# Patient Record
Sex: MALE | Race: OTHER | Employment: FULL TIME | URBAN - METROPOLITAN AREA
[De-identification: names, ages, dates, MRNs, and addresses within clinical notes are randomized per-mention and may not be internally consistent; named-entity substitution may affect disease eponyms.]

---

## 2017-09-22 ENCOUNTER — OFFICE VISIT (OUTPATIENT)
Dept: FAMILY MEDICINE CLINIC | Facility: CLINIC | Age: 52
End: 2017-09-22

## 2017-09-22 VITALS
BODY MASS INDEX: 27.16 KG/M2 | SYSTOLIC BLOOD PRESSURE: 124 MMHG | WEIGHT: 169 LBS | TEMPERATURE: 98 F | RESPIRATION RATE: 16 BRPM | HEIGHT: 66 IN | DIASTOLIC BLOOD PRESSURE: 70 MMHG | HEART RATE: 60 BPM

## 2017-09-22 DIAGNOSIS — Z12.11 COLON CANCER SCREENING: ICD-10-CM

## 2017-09-22 DIAGNOSIS — Z13.1 DIABETES MELLITUS SCREENING: ICD-10-CM

## 2017-09-22 DIAGNOSIS — Z13.29 THYROID DISORDER SCREEN: ICD-10-CM

## 2017-09-22 DIAGNOSIS — R68.82 DECREASED SEX DRIVE: ICD-10-CM

## 2017-09-22 DIAGNOSIS — M25.562 CHRONIC PAIN OF BOTH KNEES: Primary | ICD-10-CM

## 2017-09-22 DIAGNOSIS — G89.29 CHRONIC PAIN OF BOTH KNEES: Primary | ICD-10-CM

## 2017-09-22 DIAGNOSIS — Z80.0 FAMILY HISTORY OF STOMACH CANCER: ICD-10-CM

## 2017-09-22 DIAGNOSIS — M25.561 CHRONIC PAIN OF BOTH KNEES: Primary | ICD-10-CM

## 2017-09-22 DIAGNOSIS — Z13.220 LIPID SCREENING: ICD-10-CM

## 2017-09-22 DIAGNOSIS — Z13.0 SCREENING, ANEMIA, DEFICIENCY, IRON: ICD-10-CM

## 2017-09-22 LAB
ALBUMIN SERPL-MCNC: 4.1 G/DL (ref 3.5–4.8)
ALP LIVER SERPL-CCNC: 69 U/L (ref 45–117)
ALT SERPL-CCNC: 41 U/L (ref 17–63)
AST SERPL-CCNC: 21 U/L (ref 15–41)
BASOPHILS # BLD AUTO: 0.04 X10(3) UL (ref 0–0.1)
BASOPHILS NFR BLD AUTO: 0.6 %
BILIRUB SERPL-MCNC: 0.4 MG/DL (ref 0.1–2)
BUN BLD-MCNC: 15 MG/DL (ref 8–20)
CALCIUM BLD-MCNC: 8.9 MG/DL (ref 8.3–10.3)
CHLORIDE: 107 MMOL/L (ref 101–111)
CHOLEST SMN-MCNC: 202 MG/DL (ref ?–200)
CO2: 27 MMOL/L (ref 22–32)
CREAT BLD-MCNC: 1 MG/DL (ref 0.7–1.3)
EOSINOPHIL # BLD AUTO: 0.3 X10(3) UL (ref 0–0.3)
EOSINOPHIL NFR BLD AUTO: 4.4 %
ERYTHROCYTE [DISTWIDTH] IN BLOOD BY AUTOMATED COUNT: 12.3 % (ref 11.5–16)
EST. AVERAGE GLUCOSE BLD GHB EST-MCNC: 120 MG/DL (ref 68–126)
GLUCOSE BLD-MCNC: 106 MG/DL (ref 70–99)
HBA1C MFR BLD HPLC: 5.8 % (ref ?–5.7)
HCT VFR BLD AUTO: 44.4 % (ref 37–53)
HDLC SERPL-MCNC: 40 MG/DL (ref 45–?)
HDLC SERPL: 5.05 {RATIO} (ref ?–4.97)
HGB BLD-MCNC: 15.1 G/DL (ref 13–17)
IMMATURE GRANULOCYTE COUNT: 0.01 X10(3) UL (ref 0–1)
IMMATURE GRANULOCYTE RATIO %: 0.1 %
LDLC SERPL CALC-MCNC: 97 MG/DL (ref ?–130)
LDLC SERPL-MCNC: 65 MG/DL (ref 5–40)
LYMPHOCYTES # BLD AUTO: 2.64 X10(3) UL (ref 0.9–4)
LYMPHOCYTES NFR BLD AUTO: 38.8 %
M PROTEIN MFR SERPL ELPH: 7.2 G/DL (ref 6.1–8.3)
MCH RBC QN AUTO: 32 PG (ref 27–33.2)
MCHC RBC AUTO-ENTMCNC: 34 G/DL (ref 31–37)
MCV RBC AUTO: 94.1 FL (ref 80–99)
MONOCYTES # BLD AUTO: 0.53 X10(3) UL (ref 0.1–0.6)
MONOCYTES NFR BLD AUTO: 7.8 %
NEUTROPHIL ABS PRELIM: 3.28 X10 (3) UL (ref 1.3–6.7)
NEUTROPHILS # BLD AUTO: 3.28 X10(3) UL (ref 1.3–6.7)
NEUTROPHILS NFR BLD AUTO: 48.3 %
NONHDLC SERPL-MCNC: 162 MG/DL (ref ?–130)
PLATELET # BLD AUTO: 205 10(3)UL (ref 150–450)
POTASSIUM SERPL-SCNC: 3.9 MMOL/L (ref 3.6–5.1)
RBC # BLD AUTO: 4.72 X10(6)UL (ref 4.3–5.7)
RED CELL DISTRIBUTION WIDTH-SD: 43 FL (ref 35.1–46.3)
SODIUM SERPL-SCNC: 140 MMOL/L (ref 136–144)
TRIGLYCERIDES: 323 MG/DL (ref ?–150)
TSI SER-ACNC: 1.29 MIU/ML (ref 0.35–5.5)
WBC # BLD AUTO: 6.8 X10(3) UL (ref 4–13)

## 2017-09-22 PROCEDURE — 80050 GENERAL HEALTH PANEL: CPT | Performed by: FAMILY MEDICINE

## 2017-09-22 PROCEDURE — 83036 HEMOGLOBIN GLYCOSYLATED A1C: CPT | Performed by: FAMILY MEDICINE

## 2017-09-22 PROCEDURE — 99214 OFFICE O/P EST MOD 30 MIN: CPT | Performed by: FAMILY MEDICINE

## 2017-09-22 PROCEDURE — 36415 COLL VENOUS BLD VENIPUNCTURE: CPT | Performed by: FAMILY MEDICINE

## 2017-09-22 PROCEDURE — 80061 LIPID PANEL: CPT | Performed by: FAMILY MEDICINE

## 2017-09-22 RX ORDER — MELOXICAM 15 MG/1
15 TABLET ORAL
Qty: 30 TABLET | Refills: 6 | Status: SHIPPED | OUTPATIENT
Start: 2017-09-22 | End: 2018-11-20 | Stop reason: ALTCHOICE

## 2017-09-22 NOTE — PROGRESS NOTES
Calleen Hatchet is a 50year old male. HPI:   Pt is here for eval of bi knee pain pain.     Days of pain: a little while off and on but doing better since he made this appointment a few weeks ago  Quality/nature of pain: achy/throbby, front of knees, worse GENERAL HEALTH: feels well otherwise, no fever or unexplained weight loss  SKIN: denies any unusual skin lesions or rashes  RESPIRATORY: denies shortness of breath   CARDIOVASCULAR: denies chest pain   NEURO: no numbness/tingling/weakness  : see HPI  M tablet (15 mg total) by mouth daily as needed for Pain. Imaging & Consults:  GASTRO - EXTERNAL         The patient indicates understanding of these issues and agrees to the plan.     Patient Instructions   Carmina downing \"glucosamine chondroiti

## 2017-09-22 NOTE — PATIENT INSTRUCTIONS
Ud sigua tomando \"glucosamine chondroitin\"    Le he recetadao un anti-inflamatorio (meloxicam) que puede barb de vez en cuando si le duelen las rodillas    Me llame en renita semana se no Ud ha oido de los resultado del examen del Taylor

## 2017-09-23 ENCOUNTER — TELEPHONE (OUTPATIENT)
Dept: FAMILY MEDICINE CLINIC | Facility: CLINIC | Age: 52
End: 2017-09-23

## 2017-09-23 NOTE — TELEPHONE ENCOUNTER
Notes Recorded by Priya Buckner MD on 9/22/2017 at 10:23 PM CDT  Please notify pt (he does speak Javier Haste is first language, and in our office visit her wanted to speak [address] his labs look really good overall, including kidneys, liver, electrol

## 2017-09-23 NOTE — TELEPHONE ENCOUNTER
Patient notified and verbalized understanding. Patient asking if he cuts back on sugary drinks is it still ok to have a couple of beers on occasion, advised that is OK on occasion.

## 2018-04-04 NOTE — PROGRESS NOTES
HPI:    Patient ID: Maria Fernanda Hermosillo is a 50year old male. PT is here to discuss sleep and update me on his GI visit. This visit is in a mixture of Kuwaiti and english.     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

## 2018-04-04 NOTE — PATIENT INSTRUCTIONS
Pienso que la mayoridad de tatiana sintomas sone de estres.       Le recomiendo tratar unas cosas:    1) Ora o have meditacion la [employer] de jain    2) Anitra magnesium 400mg cada noche duncan las proximas 2 meses y sebastian se le ayuda con dormir, con los musculos

## 2018-09-10 ENCOUNTER — MED REC SCAN ONLY (OUTPATIENT)
Dept: FAMILY MEDICINE CLINIC | Facility: CLINIC | Age: 53
End: 2018-09-10

## 2018-09-10 NOTE — PROGRESS NOTES
received colonoscopy report from [facility]. HM updated for 5 year plan  Sent to scanning.

## 2018-10-29 ENCOUNTER — OFFICE VISIT (OUTPATIENT)
Dept: FAMILY MEDICINE CLINIC | Facility: CLINIC | Age: 53
End: 2018-10-29
Payer: COMMERCIAL

## 2018-10-29 VITALS
SYSTOLIC BLOOD PRESSURE: 122 MMHG | HEIGHT: 66 IN | WEIGHT: 170 LBS | DIASTOLIC BLOOD PRESSURE: 76 MMHG | BODY MASS INDEX: 27.32 KG/M2 | HEART RATE: 74 BPM | RESPIRATION RATE: 16 BRPM | TEMPERATURE: 98 F

## 2018-10-29 DIAGNOSIS — M25.511 CHRONIC RIGHT SHOULDER PAIN: Primary | ICD-10-CM

## 2018-10-29 DIAGNOSIS — G89.29 CHRONIC RIGHT SHOULDER PAIN: Primary | ICD-10-CM

## 2018-10-29 PROCEDURE — 99214 OFFICE O/P EST MOD 30 MIN: CPT | Performed by: FAMILY MEDICINE

## 2018-10-29 NOTE — PROGRESS NOTES
HPI:    Patient ID: Rosa Parmar is a 48year old male.     6 months ago pain started worsening after lifting a heavier object at work with his R arm, ever since that incident at work has had gradual worsening of pain, increasing in frequency and severit rotation, flexion and extension. Passive abduction: normal   Extension: normal   External rotation: normal   Forward flexion: normal   Internal rotation 0 degrees: normal     Muscle Strength   The patient has normal right shoulder strength.     Tests   Omaira Lindquist

## 2018-11-20 ENCOUNTER — OFFICE VISIT (OUTPATIENT)
Dept: FAMILY MEDICINE CLINIC | Facility: CLINIC | Age: 53
End: 2018-11-20
Payer: COMMERCIAL

## 2018-11-20 VITALS
SYSTOLIC BLOOD PRESSURE: 110 MMHG | HEART RATE: 80 BPM | BODY MASS INDEX: 28 KG/M2 | RESPIRATION RATE: 12 BRPM | TEMPERATURE: 97 F | DIASTOLIC BLOOD PRESSURE: 80 MMHG | WEIGHT: 173.63 LBS

## 2018-11-20 DIAGNOSIS — Z82.49 FAMILY HISTORY OF EARLY CAD: ICD-10-CM

## 2018-11-20 DIAGNOSIS — Z13.0 SCREENING, ANEMIA, DEFICIENCY, IRON: ICD-10-CM

## 2018-11-20 DIAGNOSIS — E78.1 HYPERTRIGLYCERIDEMIA: ICD-10-CM

## 2018-11-20 DIAGNOSIS — Z00.00 ROUTINE HISTORY AND PHYSICAL EXAMINATION OF ADULT: Primary | ICD-10-CM

## 2018-11-20 DIAGNOSIS — Z13.29 THYROID DISORDER SCREEN: ICD-10-CM

## 2018-11-20 DIAGNOSIS — Z12.5 PROSTATE CANCER SCREENING: ICD-10-CM

## 2018-11-20 DIAGNOSIS — M54.12 CERVICAL RADICULOPATHY: ICD-10-CM

## 2018-11-20 DIAGNOSIS — R73.09 ELEVATED HEMOGLOBIN A1C: ICD-10-CM

## 2018-11-20 DIAGNOSIS — G89.29 CHRONIC RIGHT SHOULDER PAIN: ICD-10-CM

## 2018-11-20 DIAGNOSIS — M25.511 CHRONIC RIGHT SHOULDER PAIN: ICD-10-CM

## 2018-11-20 PROCEDURE — 99396 PREV VISIT EST AGE 40-64: CPT | Performed by: FAMILY MEDICINE

## 2018-11-20 PROCEDURE — 83036 HEMOGLOBIN GLYCOSYLATED A1C: CPT | Performed by: FAMILY MEDICINE

## 2018-11-20 PROCEDURE — 83721 ASSAY OF BLOOD LIPOPROTEIN: CPT | Performed by: FAMILY MEDICINE

## 2018-11-20 PROCEDURE — 99213 OFFICE O/P EST LOW 20 MIN: CPT | Performed by: FAMILY MEDICINE

## 2018-11-20 PROCEDURE — 96372 THER/PROPH/DIAG INJ SC/IM: CPT | Performed by: FAMILY MEDICINE

## 2018-11-20 PROCEDURE — 84153 ASSAY OF PSA TOTAL: CPT | Performed by: FAMILY MEDICINE

## 2018-11-20 PROCEDURE — 80050 GENERAL HEALTH PANEL: CPT | Performed by: FAMILY MEDICINE

## 2018-11-20 PROCEDURE — 36415 COLL VENOUS BLD VENIPUNCTURE: CPT | Performed by: FAMILY MEDICINE

## 2018-11-20 PROCEDURE — 80061 LIPID PANEL: CPT | Performed by: FAMILY MEDICINE

## 2018-11-20 RX ORDER — CYCLOBENZAPRINE HCL 10 MG
TABLET ORAL
COMMUNITY
Start: 2018-11-14 | End: 2018-12-10 | Stop reason: ALTCHOICE

## 2018-11-20 RX ORDER — METHYLPREDNISOLONE 4 MG/1
TABLET ORAL
Qty: 1 KIT | Refills: 0 | Status: SHIPPED | OUTPATIENT
Start: 2018-11-20 | End: 2018-12-10 | Stop reason: ALTCHOICE

## 2018-11-20 RX ORDER — KETOROLAC TROMETHAMINE 30 MG/ML
60 INJECTION, SOLUTION INTRAMUSCULAR; INTRAVENOUS ONCE
Status: COMPLETED | OUTPATIENT
Start: 2018-11-20 | End: 2018-11-20

## 2018-11-20 RX ADMIN — KETOROLAC TROMETHAMINE 60 MG: 30 INJECTION, SOLUTION INTRAMUSCULAR; INTRAVENOUS at 09:09:00

## 2018-11-20 NOTE — PATIENT INSTRUCTIONS
Te dimos un inyeccion de ketorolaco hoy. Asegúrese de decirle al médico de compensación del trabajador que no le darán nada que interactúe con eso hoy. A partir de Roseanne, comience el paquete de dosis de medrol (el esteroide que prescribí).     Ordené un

## 2018-11-20 NOTE — PROGRESS NOTES
Baudilio Contreras is a 48year old male who presents for a complete physical exam.   HPI:   Pt here with his sister in law (a patient of mine).     Patient is mostly concerned about his R shoulder, he was here 10/29 after a work incident lifting something joe Tab  Disp:  Rfl:    Meloxicam 15 MG Oral Tab Take 1 tablet (15 mg total) by mouth daily as needed for Pain. Disp: 30 tablet Rfl: 6      No past medical history on file.    Past Surgical History:   Procedure Laterality Date   • CHOLECYSTECTOMY     • OTHER KELLEY tender,+ sig tenderness to palpation mid cervical region, R paracervical and R upper back muscle, R SCM, decreased ROM of neck and R shoulder, pain with any movement of either, reflexes and gross sensation to touch present in bi arms,  is weaker in R t of ketorolac today, start medrol pack tomorrow; MRI ordered but otld patient and sister in law several times he needs to check with workmans comp, see if he can proceed with MRI I ordered or if he has to go through [facility] doctor, or if needs special

## 2018-11-20 NOTE — PROGRESS NOTES
ketoralac given IM to right upper outer gluteus, pt will well. Pt in clinic for 15 min post injection and no complaints of SOB/GLYNN/swelling.  Pt ambulated out of the clinic in stable condition

## 2018-11-21 ENCOUNTER — MED REC SCAN ONLY (OUTPATIENT)
Dept: FAMILY MEDICINE CLINIC | Facility: CLINIC | Age: 53
End: 2018-11-21

## 2018-11-23 ENCOUNTER — TELEPHONE (OUTPATIENT)
Dept: FAMILY MEDICINE CLINIC | Facility: CLINIC | Age: 53
End: 2018-11-23

## 2018-11-23 NOTE — TELEPHONE ENCOUNTER
----- Message from Saul Hanks MD sent at 11/22/2018 12:16 AM CST -----  Blood counts, kidneys, liver, elctrolytes, thyroid, prostate look good. Triglycerides and blood sugar mildly elevated,putting him at risk for diabetes and heart dz.   If there is ro

## 2018-11-24 NOTE — TELEPHONE ENCOUNTER
Patient advised, verbalized understanding,   Stated he will call to schedule the [facility].

## 2018-11-26 ENCOUNTER — TELEPHONE (OUTPATIENT)
Dept: FAMILY MEDICINE CLINIC | Facility: CLINIC | Age: 53
End: 2018-11-26

## 2018-11-26 NOTE — TELEPHONE ENCOUNTER
Please have someone that speaks Kyrgyz call back. Pt has MRI info but isn't sure what the next step is. Daughter who translates will not be available.

## 2018-11-26 NOTE — TELEPHONE ENCOUNTER
Pt called back and he states he was mailed a letter with a claim [id number] and claim adjustor name: Steven Briceno Phone number [phone] Ext: [phone]      Pt would like a call when MRI is authorized.

## 2018-11-27 NOTE — TELEPHONE ENCOUNTER
[employer] spoke with Lary Miller, she is at the extension that was given  The  is Jeff Starr at ext [phone]  I asked about the MRI- she states that no authorization is need perse- just that the facility needs to accept work comp as th

## 2018-11-28 ENCOUNTER — TELEPHONE (OUTPATIENT)
Dept: FAMILY MEDICINE CLINIC | Facility: CLINIC | Age: 53
End: 2018-11-28

## 2018-11-28 NOTE — TELEPHONE ENCOUNTER
Patient has been notified, verbalized understanding of information. Denies further questions. Pt states he will come in and sign medical release sign.

## 2018-11-28 NOTE — TELEPHONE ENCOUNTER
Pt  Should be able to go to [facility] for the [facility]- he can call to schedule [phone] do need the pt to sign a records release so I can forward the records to the work [employer]

## 2018-11-28 NOTE — TELEPHONE ENCOUNTER
Spoke to pt he wanted the order number for the MRI- he found it on his own and has his MRI scheduled. Nothing further needed.

## 2018-11-28 NOTE — TELEPHONE ENCOUNTER
Pt has a questions but he only wants to talk to someone who speaks Tajik, He would not let me try to help him.    Please return call to [phone]

## 2018-11-30 ENCOUNTER — HOSPITAL ENCOUNTER (OUTPATIENT)
Dept: MRI IMAGING | Facility: HOSPITAL | Age: 53
Discharge: HOME | End: 2018-11-30
Attending: FAMILY MEDICINE
Payer: OTHER MISCELLANEOUS

## 2018-11-30 DIAGNOSIS — G89.29 CHRONIC RIGHT SHOULDER PAIN: ICD-10-CM

## 2018-11-30 DIAGNOSIS — M54.12 CERVICAL RADICULOPATHY: ICD-10-CM

## 2018-11-30 DIAGNOSIS — M25.511 CHRONIC RIGHT SHOULDER PAIN: ICD-10-CM

## 2018-11-30 PROCEDURE — 72141 MRI NECK SPINE W/O DYE: CPT | Performed by: FAMILY MEDICINE

## 2018-12-01 ENCOUNTER — TELEPHONE (OUTPATIENT)
Dept: FAMILY MEDICINE CLINIC | Facility: CLINIC | Age: 53
End: 2018-12-01

## 2018-12-01 RX ORDER — GABAPENTIN 300 MG/1
300 CAPSULE ORAL 2 TIMES DAILY
Qty: 60 CAPSULE | Refills: 0 | Status: ON HOLD | OUTPATIENT
Start: 2018-12-01 | End: 2019-03-15

## 2018-12-01 NOTE — TELEPHONE ENCOUNTER
Pt and son stopped in the office and explained the results and the recommendations for the follow up- I gave the pt 3 sample bottles of Aleve to take with him    Son and pt deny any more denies any more questions    Gave them a copy of the nam,es for the n

## 2018-12-01 NOTE — TELEPHONE ENCOUNTER
Glad we did the neck MRI, it is giving the reason for his pain--he has a herniated disc (We have discs between all our vertebrae, the bony part down the middle of the back we can feel--these discs are like jelly donuts that act like shock absorbers; a disc

## 2018-12-01 NOTE — TELEPHONE ENCOUNTER
Spoke with the pt and advised of the notes from Dr. Anton Silva. He v/u he told me that the steroid didn't help him. I advised of the other medication to try and that I will send to the pharmacy.   He is going to have his son call her to talk to me to verfiy the i

## 2018-12-03 ENCOUNTER — TELEPHONE (OUTPATIENT)
Dept: FAMILY MEDICINE CLINIC | Facility: CLINIC | Age: 53
End: 2018-12-03

## 2018-12-03 ENCOUNTER — TELEPHONE (OUTPATIENT)
Dept: NEUROLOGY | Facility: CLINIC | Age: 53
End: 2018-12-03

## 2018-12-03 NOTE — TELEPHONE ENCOUNTER
Clarence Felton from [employer] needs to speak with Dr Robert Syed regarding this patients W/C visits. He also needs a copy of patients MRI results and Last OV note faxed to him at [phone] ATTN: Clarence Venegas. Please also include Case # [id number] on the fax.

## 2018-12-03 NOTE — TELEPHONE ENCOUNTER
Pt states he has an appt with neurosurgeon on 1-. He states he is in a lot of pain. He states Dr. Haider Dalton gave him a note to be off work from 12- to 12-.  When he called to schedule appt with neurosurgeon they told him if he needed to be s

## 2018-12-03 NOTE — TELEPHONE ENCOUNTER
Spoke with peg, he wanted clarification on what was going on, thought it might be shoulder injury, but then looked at neck.  I reviewed my notes and told him at my oct vist with patient I had thought more rotator cuff, were going to try PHYSICAL THERAPY po

## 2018-12-03 NOTE — TELEPHONE ENCOUNTER
Gave patient information below. Advised pt to take paperwork from [email or web address]. Pt was very thankful to Dr. Maverick Spivey for getting him in sooner.

## 2018-12-03 NOTE — TELEPHONE ENCOUNTER
Called LAURIE and was able to change the appt to Friday December 7th @ 230 with Nay Marroquin.  Make sure he gets there 15 min early    In the [place] office

## 2018-12-03 NOTE — TELEPHONE ENCOUNTER
Adjustor inquired nature of consult. Per PCP notes neck and shoulder pain, herniated disc C6-7 per MRI. Adjustor will obtain copy of MRI report and CB with decision as whether W/C will authorize consult.

## 2018-12-03 NOTE — TELEPHONE ENCOUNTER
Tony, can you please call the office I referred him to, let them know we'd like him seen this week given his severe symptoms correlating with MRI findings, if they need to talk to me directly come get me, thanks.   If they can't accommodate let's try

## 2018-12-06 ENCOUNTER — TELEPHONE (OUTPATIENT)
Dept: FAMILY MEDICINE CLINIC | Facility: CLINIC | Age: 53
End: 2018-12-06

## 2018-12-06 DIAGNOSIS — M50.20 HERNIATED CERVICAL DISC: Primary | ICD-10-CM

## 2018-12-06 DIAGNOSIS — M54.12 CERVICAL RADICULOPATHY: ICD-10-CM

## 2018-12-06 NOTE — TELEPHONE ENCOUNTER
Elham Izaguirre called informing that PCP is out of office and unable to get a referral from office. Can not at this time approve WC. Elham Izaguirre also informed us that she talked to patient and informed patient of this.   Indicated that this will have to be billed to p

## 2018-12-06 NOTE — TELEPHONE ENCOUNTER
Appt with specialist for Friday, would like more info on this appt - did [address] make the appt for him?  Asked for return call

## 2018-12-06 NOTE — TELEPHONE ENCOUNTER
Brayden Locke called back and indicated she was able to get a referral from PCP office for patient to come and see Neurosurgeon.   Approved for visit 12/7/2018

## 2018-12-06 NOTE — TELEPHONE ENCOUNTER
Brien Dorado and inquired appointment with Dr Negrita Hennessy 12/7/2018 is covered by [facility]? Joselin Smith indicated that this case was switched to [facility] at [address] [phone]. He transferred to her.   She indicated that she did not get a referral from PCP ind

## 2018-12-06 NOTE — TELEPHONE ENCOUNTER
[id number] [employer] [place] [address] [employer]  IS NEEDING SOME FORM OF HARD COPY OR REFERRAL TO NEUROSURGEON.     PLEASE FAX TO  [phone]    [address] OUT OF OFFICE UNTIL TOMORROW   V/U

## 2018-12-06 NOTE — TELEPHONE ENCOUNTER
Pt was asking if we had faxed information requested to workmans comp. This was verified in other message today. No further questions.

## 2018-12-07 ENCOUNTER — OFFICE VISIT (OUTPATIENT)
Dept: SURGERY | Facility: CLINIC | Age: 53
End: 2018-12-07
Payer: OTHER MISCELLANEOUS

## 2018-12-07 VITALS — BODY MASS INDEX: 27.64 KG/M2 | WEIGHT: 172 LBS | HEIGHT: 66 IN

## 2018-12-07 DIAGNOSIS — M50.10 HERNIATION OF CERVICAL INTERVERTEBRAL DISC WITH RADICULOPATHY: Primary | ICD-10-CM

## 2018-12-07 PROCEDURE — 99204 OFFICE O/P NEW MOD 45 MIN: CPT | Performed by: PHYSICIAN ASSISTANT

## 2018-12-07 RX ORDER — PREDNISONE 10 MG/1
10 TABLET ORAL DAILY
Qty: 30 TABLET | Refills: 0 | Status: SHIPPED | OUTPATIENT
Start: 2018-12-07 | End: 2019-01-02 | Stop reason: ALTCHOICE

## 2018-12-07 RX ORDER — HYDROCODONE BITARTRATE AND ACETAMINOPHEN 10; 325 MG/1; MG/1
1 TABLET ORAL EVERY 4 HOURS PRN
Qty: 90 TABLET | Refills: 1 | Status: ON HOLD | OUTPATIENT
Start: 2018-12-07 | End: 2019-03-15

## 2018-12-07 NOTE — PROGRESS NOTES
Location of Pain:  Pt states that he has cervical pain. Pt states that he has right arm pain. Pt states he has numbness and tingling in the right arm. Pt states that he has weakness in the neck. Pt states that he has issues with balance.  Pt states that he

## 2018-12-07 NOTE — PROGRESS NOTES
[facility] Neurosurgery Consultation      HISTORY OF PRESENT ILLNESS:Jeffery Charlesalicia Brown is a 48year old RH male  male who primarily speaks Malay but some [place]. He is son and daughter who are adults are with him to help translate today.     She gives to Visit:  gabapentin 300 MG Oral Cap Take 1 capsule (300 mg total) by mouth 2 (two) times daily. Disp: 60 capsule Rfl: 0   Cyclobenzaprine HCl 10 MG Oral Tab  Disp:  Rfl:    methylPREDNISolone (MEDROL) 4 MG Oral Tablet Therapy Pack As directed.  Disp: 1 ki secondary to an acute C6-7 disc herniation emanating out of injury where the patient was lifting a ramp on or about November 5, 2018    PLAN:  1. Placed him on a prednisone taper, Norco for pain  2. Off of work beginning 11/20/18 until further notice  3.

## 2018-12-07 NOTE — PATIENT INSTRUCTIONS
Refill policies:    • Allow 2-3 business days for refills; controlled substances may take longer.   • Contact your pharmacy at least 5 days prior to running out of medication and have them send an electronic request or submit request through the “request re entire amount billed. Precertification and Prior Authorizations: If your physician has recommended that you have a procedure or additional testing performed.   [facility]) will contact your insurance carrier to obtain pre-certi Skyla Kowalski. If he is making objective improvements with strength and pain we will continue with conservative care. If he fails conservative measures he understands he needs surgical intervention to correct his pathology.

## 2018-12-10 ENCOUNTER — OFFICE VISIT (OUTPATIENT)
Dept: PAIN CLINIC | Facility: CLINIC | Age: 53
End: 2018-12-10
Payer: OTHER MISCELLANEOUS

## 2018-12-10 ENCOUNTER — TELEPHONE (OUTPATIENT)
Dept: PAIN CLINIC | Facility: CLINIC | Age: 53
End: 2018-12-10

## 2018-12-10 VITALS
SYSTOLIC BLOOD PRESSURE: 120 MMHG | WEIGHT: 170 LBS | DIASTOLIC BLOOD PRESSURE: 70 MMHG | BODY MASS INDEX: 27.32 KG/M2 | OXYGEN SATURATION: 96 % | HEIGHT: 66 IN | HEART RATE: 92 BPM

## 2018-12-10 DIAGNOSIS — M54.12 CERVICAL RADICULOPATHY: Primary | ICD-10-CM

## 2018-12-10 PROCEDURE — 99214 OFFICE O/P EST MOD 30 MIN: CPT | Performed by: NURSE PRACTITIONER

## 2018-12-10 RX ORDER — TIZANIDINE HYDROCHLORIDE 2 MG/1
2 CAPSULE, GELATIN COATED ORAL EVERY 8 HOURS PRN
Qty: 90 CAPSULE | Refills: 0 | Status: SHIPPED | OUTPATIENT
Start: 2018-12-10 | End: 2019-01-09

## 2018-12-10 RX ORDER — METHOCARBAMOL 500 MG/1
500 TABLET, FILM COATED ORAL 3 TIMES DAILY PRN
Qty: 90 TABLET | Refills: 0 | Status: SHIPPED | OUTPATIENT
Start: 2018-12-10 | End: 2018-12-10 | Stop reason: RX

## 2018-12-10 NOTE — PROGRESS NOTES
Physical Exam   Constitutional:                Location of Pain: shoulder, right arm, and hands     Date Pain Began: 11/5/18           Work Related:   Yes        Receiving Work Comp/Disability:   Yes    Numeric Rating Scale:  Pain at Present:  9

## 2018-12-10 NOTE — TELEPHONE ENCOUNTER
Patient is Workcomp-    Medical clearance needed- no    Pt seen in OV today by Jaci and recommended for PAOLA with Dr. Brittanie García (X 3). Please begin PA process for procedure(s). Laterality: n/a  Level(s): n/a    Pt informed callback will be given when PA feedback received and procedure date to be scheduled after approval.  All procedural instructions reviewed, procedure line number provided. Pt verbalized understanding and agreeable to plan. Copy of instructions provided.     Implanted cardiac device/SCS/PNS: n/a

## 2018-12-10 NOTE — PATIENT INSTRUCTIONS
Refill policies:    • Allow 2-3 business days for refills; controlled substances may take longer.   • Contact your pharmacy at least 5 days prior to running out of medication and have them send an electronic request or submit request through the “request re entire amount billed. Precertification and Prior Authorizations: If your physician has recommended that you have a procedure or additional testing performed.   [facility]) will contact your insurance carrier to obtain pre-certi

## 2018-12-11 ENCOUNTER — TELEPHONE (OUTPATIENT)
Dept: SURGERY | Facility: CLINIC | Age: 53
End: 2018-12-11

## 2018-12-11 ENCOUNTER — TELEPHONE (OUTPATIENT)
Dept: FAMILY MEDICINE CLINIC | Facility: CLINIC | Age: 53
End: 2018-12-11

## 2018-12-11 NOTE — TELEPHONE ENCOUNTER
rcvd med rec req from Sent for med recs from 11/5/18 to present. Faxed 12/7/18 OV,MRI 11/30/18. Fax completed.  Sent req to scanning

## 2018-12-12 NOTE — TELEPHONE ENCOUNTER
Spoke with Janet Faye at [facility] in regards to patient and getting 3 CESIs approved. She did approve them and will be faxing documentation over. Fax received.    Patient can now be scheduled out for all three injections

## 2018-12-13 NOTE — TELEPHONE ENCOUNTER
Patient scheduled 2 out of the 3 injections. He has a consult with Neurosurgery and will determine if will get 3rd or proceed with surgery. Patient scheduled for 12/17/18 at 915 and 12/26/18 at 930. Pre-procedure instructions reviewed with patient. Patient verbalized understanding.

## 2018-12-14 PROBLEM — M54.12 CERVICAL RADICULOPATHY: Status: ACTIVE | Noted: 2018-12-14

## 2018-12-14 NOTE — PROGRESS NOTES
Name: Chandrakant Brown   : 1965   DOS: 2018     Pain Clinic Follow Up Visit:   Chandrakant Brown is a 48year old male who presents for recommendation regarding injection procedures, referred by neurosurgery team.  Patient is here with his nephe 90 tablet Rfl: 1   gabapentin 300 MG Oral Cap Take 1 capsule (300 mg total) by mouth 2 (two) times daily.  Disp: 60 capsule Rfl: 0         EXAM:   /70   Pulse 92   Ht 66\"   Wt 170 lb   SpO2 96%   BMI 27.44 kg/m²   General: 48year old male in no acut Normal foramen magnum with no Chiari malformation. PARASPINAL AREA:  Normal with no visible mass. BONY STRUCTURES:  No fracture, pars defect, or osseous lesion.     CORD:  Normal caliber, contour, and signal intensity.       =====  CONCLUSION:  Right sufficient to help manage pain. Radiology orders and consultations:None    The patient indicates understanding of these issues and agrees to the plan. Return in about 2 months (around 2/10/2019).     ALBERTO Robertson, 12/14/2018, 10:03 AM

## 2018-12-17 ENCOUNTER — HOSPITAL ENCOUNTER (OUTPATIENT)
Facility: HOSPITAL | Age: 53
Setting detail: HOSPITAL OUTPATIENT SURGERY
Discharge: HOME | End: 2018-12-17
Attending: ANESTHESIOLOGY | Admitting: ANESTHESIOLOGY
Payer: OTHER MISCELLANEOUS

## 2018-12-17 ENCOUNTER — APPOINTMENT (OUTPATIENT)
Dept: GENERAL RADIOLOGY | Facility: HOSPITAL | Age: 53
End: 2018-12-17
Attending: ANESTHESIOLOGY
Payer: OTHER MISCELLANEOUS

## 2018-12-17 VITALS
BODY MASS INDEX: 27 KG/M2 | WEIGHT: 170 LBS | RESPIRATION RATE: 18 BRPM | DIASTOLIC BLOOD PRESSURE: 89 MMHG | HEART RATE: 73 BPM | TEMPERATURE: 97 F | SYSTOLIC BLOOD PRESSURE: 122 MMHG | OXYGEN SATURATION: 97 %

## 2018-12-17 DIAGNOSIS — M54.12 CERVICAL RADICULOPATHY: ICD-10-CM

## 2018-12-17 PROCEDURE — B01B1ZZ FLUOROSCOPY OF SPINAL CORD USING LOW OSMOLAR CONTRAST: ICD-10-PCS | Performed by: ANESTHESIOLOGY

## 2018-12-17 PROCEDURE — 3E0R33Z INTRODUCTION OF ANTI-INFLAMMATORY INTO SPINAL CANAL, PERCUTANEOUS APPROACH: ICD-10-PCS | Performed by: ANESTHESIOLOGY

## 2018-12-17 PROCEDURE — 99152 MOD SED SAME PHYS/QHP 5/>YRS: CPT | Performed by: ANESTHESIOLOGY

## 2018-12-17 RX ORDER — ONDANSETRON 2 MG/ML
4 INJECTION INTRAMUSCULAR; INTRAVENOUS ONCE AS NEEDED
Status: DISCONTINUED | OUTPATIENT
Start: 2018-12-17 | End: 2018-12-17

## 2018-12-17 RX ORDER — DEXAMETHASONE SODIUM PHOSPHATE 10 MG/ML
INJECTION, SOLUTION INTRAMUSCULAR; INTRAVENOUS AS NEEDED
Status: DISCONTINUED | OUTPATIENT
Start: 2018-12-17 | End: 2018-12-17

## 2018-12-17 RX ORDER — 0.9 % SODIUM CHLORIDE 0.9 %
VIAL (ML) INJECTION AS NEEDED
Status: DISCONTINUED | OUTPATIENT
Start: 2018-12-17 | End: 2018-12-17

## 2018-12-17 RX ORDER — LIDOCAINE HYDROCHLORIDE 10 MG/ML
INJECTION, SOLUTION EPIDURAL; INFILTRATION; INTRACAUDAL; PERINEURAL AS NEEDED
Status: DISCONTINUED | OUTPATIENT
Start: 2018-12-17 | End: 2018-12-17

## 2018-12-17 RX ORDER — MIDAZOLAM HYDROCHLORIDE 1 MG/ML
INJECTION INTRAMUSCULAR; INTRAVENOUS AS NEEDED
Status: DISCONTINUED | OUTPATIENT
Start: 2018-12-17 | End: 2018-12-17

## 2018-12-17 RX ORDER — SODIUM CHLORIDE, SODIUM LACTATE, POTASSIUM CHLORIDE, CALCIUM CHLORIDE 600; 310; 30; 20 MG/100ML; MG/100ML; MG/100ML; MG/100ML
100 INJECTION, SOLUTION INTRAVENOUS CONTINUOUS
Status: DISCONTINUED | OUTPATIENT
Start: 2018-12-17 | End: 2018-12-17

## 2018-12-17 RX ORDER — DIPHENHYDRAMINE HYDROCHLORIDE 50 MG/ML
50 INJECTION INTRAMUSCULAR; INTRAVENOUS ONCE AS NEEDED
Status: DISCONTINUED | OUTPATIENT
Start: 2018-12-17 | End: 2018-12-17

## 2018-12-17 NOTE — H&P
History & Physical Examination    Patient Name: Fei Masterson  MRN: [id number]  CSN: [id number]  YOB: 1965    Pre-Operative Diagnosis:  Cervical radiculopathy [M54.12]    Present Illness: Patient with cervical radiculopathy here for cervical ]    HEART [x ] [x ]    LUNGS [x ] [x ]    ABDOMEN [x ] [x ]    UROGENITAL [x ] [x ]    EXTREMITIES [x ] [x ]    OTHER        [ x ] I have discussed the risks and benefits and alternatives with the patient/family.   They understand and agree to proceed with

## 2018-12-17 NOTE — OPERATIVE REPORT
[facility]  Operative Report  2018     Mckenna Akins Patient Status:  Hospital Outpatient Surgery    1965 MRN [id number]   [facility] SURGERY Attending Aisha Soares MD   Hosp Day # 0 PCP Kate Briggs MD     Indication: Erica Zuniga obtaining a good epidurogram by injecting Omnipaque 180 1 mL, a combination of normal saline and dexamethasone 10 mg in total volume of 4 mL was injected. The needle was then flushed with normal saline 1 mL. The stylet re-applied.   The needle was withdra

## 2018-12-19 ENCOUNTER — TELEPHONE (OUTPATIENT)
Dept: SURGERY | Facility: CLINIC | Age: 53
End: 2018-12-19

## 2018-12-19 NOTE — TELEPHONE ENCOUNTER
Spoke to patient, confirmed procedure date of 12/26/18 and to be checked in at outpatient registration at 8:30 am. Patient instructed to call pre-procedure line before procedure at [phone].  Patient instructed to call office if there are additional que

## 2018-12-20 ENCOUNTER — TELEPHONE (OUTPATIENT)
Dept: SURGERY | Facility: CLINIC | Age: 53
End: 2018-12-20

## 2018-12-20 NOTE — TELEPHONE ENCOUNTER
Pt provided update post injection via phone ; pt felt some discomfort and pain around injection area and wanted Dr. Jhony Goldberg to know; pt wants to stress that he's not discouraged by this and would like to continue with plan.  Pt notes he feels good n

## 2018-12-26 ENCOUNTER — APPOINTMENT (OUTPATIENT)
Dept: GENERAL RADIOLOGY | Facility: HOSPITAL | Age: 53
End: 2018-12-26
Attending: ANESTHESIOLOGY
Payer: OTHER MISCELLANEOUS

## 2018-12-26 ENCOUNTER — HOSPITAL ENCOUNTER (OUTPATIENT)
Facility: HOSPITAL | Age: 53
Setting detail: HOSPITAL OUTPATIENT SURGERY
Discharge: HOME | End: 2018-12-26
Attending: ANESTHESIOLOGY | Admitting: ANESTHESIOLOGY
Payer: OTHER MISCELLANEOUS

## 2018-12-26 VITALS
SYSTOLIC BLOOD PRESSURE: 150 MMHG | TEMPERATURE: 97 F | HEART RATE: 70 BPM | OXYGEN SATURATION: 96 % | DIASTOLIC BLOOD PRESSURE: 101 MMHG | RESPIRATION RATE: 18 BRPM

## 2018-12-26 DIAGNOSIS — M54.12 CERVICAL RADICULOPATHY: ICD-10-CM

## 2018-12-26 PROCEDURE — 3E0S33Z INTRODUCTION OF ANTI-INFLAMMATORY INTO EPIDURAL SPACE, PERCUTANEOUS APPROACH: ICD-10-PCS | Performed by: ANESTHESIOLOGY

## 2018-12-26 PROCEDURE — 99152 MOD SED SAME PHYS/QHP 5/>YRS: CPT | Performed by: ANESTHESIOLOGY

## 2018-12-26 RX ORDER — DEXAMETHASONE SODIUM PHOSPHATE 10 MG/ML
INJECTION, SOLUTION INTRAMUSCULAR; INTRAVENOUS AS NEEDED
Status: DISCONTINUED | OUTPATIENT
Start: 2018-12-26 | End: 2018-12-26 | Stop reason: HOSPADM

## 2018-12-26 RX ORDER — LIDOCAINE HYDROCHLORIDE 10 MG/ML
INJECTION, SOLUTION EPIDURAL; INFILTRATION; INTRACAUDAL; PERINEURAL AS NEEDED
Status: DISCONTINUED | OUTPATIENT
Start: 2018-12-26 | End: 2018-12-26 | Stop reason: HOSPADM

## 2018-12-26 RX ORDER — ONDANSETRON 2 MG/ML
4 INJECTION INTRAMUSCULAR; INTRAVENOUS ONCE AS NEEDED
Status: DISCONTINUED | OUTPATIENT
Start: 2018-12-26 | End: 2018-12-26 | Stop reason: HOSPADM

## 2018-12-26 RX ORDER — SODIUM CHLORIDE, SODIUM LACTATE, POTASSIUM CHLORIDE, CALCIUM CHLORIDE 600; 310; 30; 20 MG/100ML; MG/100ML; MG/100ML; MG/100ML
100 INJECTION, SOLUTION INTRAVENOUS CONTINUOUS
Status: DISCONTINUED | OUTPATIENT
Start: 2018-12-26 | End: 2018-12-26

## 2018-12-26 RX ORDER — 0.9 % SODIUM CHLORIDE 0.9 %
VIAL (ML) INJECTION AS NEEDED
Status: DISCONTINUED | OUTPATIENT
Start: 2018-12-26 | End: 2018-12-26 | Stop reason: HOSPADM

## 2018-12-26 RX ORDER — DIPHENHYDRAMINE HYDROCHLORIDE 50 MG/ML
50 INJECTION INTRAMUSCULAR; INTRAVENOUS ONCE AS NEEDED
Status: DISCONTINUED | OUTPATIENT
Start: 2018-12-26 | End: 2018-12-26

## 2018-12-26 RX ORDER — MIDAZOLAM HYDROCHLORIDE 1 MG/ML
INJECTION INTRAMUSCULAR; INTRAVENOUS AS NEEDED
Status: DISCONTINUED | OUTPATIENT
Start: 2018-12-26 | End: 2018-12-26 | Stop reason: HOSPADM

## 2018-12-26 RX ORDER — ONDANSETRON 2 MG/ML
4 INJECTION INTRAMUSCULAR; INTRAVENOUS ONCE AS NEEDED
Status: DISCONTINUED | OUTPATIENT
Start: 2018-12-26 | End: 2018-12-26

## 2018-12-26 NOTE — H&P
History & Physical Examination    Patient Name: Avrli Claire  MRN: [id number]  CSN: [id number]  YOB: 1965    Pre-Operative Diagnosis:  Cervical radiculopathy [M54.12]    Present Illness: Patient with cervical radiculopathy here for cervical Check if Physical Exam is Normal If not normal, please explain:   HEENT [x ] [x ]    NECK & BACK [x ] [x ]    HEART [x ] [x ]    LUNGS [x ] [x ]    ABDOMEN [x ] [x ]    UROGENITAL [x ] [x ]    EXTREMITIES [x ] [x ]    OTHER        [ x ] I have discussed th

## 2018-12-26 NOTE — OPERATIVE REPORT
[facility]  Operative Report  2018     Benson Crisostomo Patient Status:  Hospital Outpatient Surgery    1965 MRN [id number]   [facility] SURGERY Attending Nette Wolfe MD   Hosp Day # 0 PCP Saul Hanks MD     Indication: Sonali Roberto obtaining a good epidurogram by injecting Omnipaque 180 1 mL, a combination of normal saline and dexamethasone 10 mg in total volume of 4 mL was injected. The needle was then flushed with normal saline 1 mL. The stylet re-applied.   The needle was withdra

## 2019-01-02 ENCOUNTER — TELEPHONE (OUTPATIENT)
Dept: SURGERY | Facility: CLINIC | Age: 54
End: 2019-01-02

## 2019-01-02 ENCOUNTER — OFFICE VISIT (OUTPATIENT)
Dept: SURGERY | Facility: CLINIC | Age: 54
End: 2019-01-02
Payer: OTHER MISCELLANEOUS

## 2019-01-02 VITALS — HEART RATE: 82 BPM | DIASTOLIC BLOOD PRESSURE: 78 MMHG | SYSTOLIC BLOOD PRESSURE: 130 MMHG

## 2019-01-02 DIAGNOSIS — M54.12 CERVICAL RADICULOPATHY: Primary | ICD-10-CM

## 2019-01-02 PROCEDURE — 99213 OFFICE O/P EST LOW 20 MIN: CPT | Performed by: NEUROLOGICAL SURGERY

## 2019-01-02 NOTE — PROGRESS NOTES
Pt is here for follow up post epidural cervical injections. Pt has received 2 injections since LOV. Pt states that he didn't get any relief from the injections. Pt states that he has new pain in the right bicep. Pt states that it is a stiff tight pain.  Pt

## 2019-01-02 NOTE — PATIENT INSTRUCTIONS
Refill policies:    • Allow 2-3 business days for refills; controlled substances may take longer.   • Contact your pharmacy at least 5 days prior to running out of medication and have them send an electronic request or submit request through the “request re entire amount billed. Precertification and Prior Authorizations: If your physician has recommended that you have a procedure or additional testing performed.   [facility]) will contact your insurance carrier to obtain pre-certi also get you scheduled for all your pre-op testing required for your surgery. · You will need pre operative labs which will include MRSA/MSSA testing. If positive you will be contacted by our office with further instructions.    · No blood thinning medica Although the class is not mandatory, you are strongly encouraged to attend.  Make sure to bring your surgical binder to the class    The Pre-op Spine Surgery Class is held at [facility] most Wednesdays from 4:00-5:00 pm.  Call Pre-admission Testing (PA

## 2019-01-02 NOTE — PROGRESS NOTES
[facility]  Neurosurgery Clinic Visit      HISTORY OF PRESENT ILLNESS:  Arnaud Carson is a(n) 48year old male previously seen by Estuardo Ang in our office. He had a work injury in early 11/18.  Immediately afterward had right travis midline.      Motor: R T4 HG4+ IO4+, otherwise 5/5 x 4   Sensation: intact to light touch bilaterally     Reflexes: 1+, no clonus, no marrero's   Coordination: deferred   Gait: deferred       DIAGNOSTIC DATA:      IMAGING:  MRI cervical with right C6-7 disc

## 2019-01-02 NOTE — TELEPHONE ENCOUNTER
You are scheduled for Anterior Cervical 6-7 arthroplasty on 2/12/19 with      Pre-op instructions discussed with patient and surgical packet provided:     · You will need to contact the Pre-admission department at [phone].  You will speak wit Xarelto, etc) prior to surgery that we had you stop for surgery, please make sure you get instructions about when to resume the medication before you are discharged from the hospital  · Post operative appointment to be made 2 and 8 weeks after surgery.

## 2019-01-07 NOTE — TELEPHONE ENCOUNTER
Pt asking who does the PA for sx if pt has W/C, please call back; pt does speak [place] but Nepalese is easier

## 2019-01-07 NOTE — TELEPHONE ENCOUNTER
Spoke with elnin case  to obtain fax number. Fax number provided [phone]. Faxed OV note and imaging to above number. Spoke with patient and notified them of update. Advised WC will obtain authorization.  Patient verbalized understandin

## 2019-01-09 ENCOUNTER — TELEPHONE (OUTPATIENT)
Dept: SURGERY | Facility: CLINIC | Age: 54
End: 2019-01-09

## 2019-01-09 NOTE — TELEPHONE ENCOUNTER
Received fax from [facility], surgery denied pending independent medical exam. Endorsed to provider.

## 2019-01-09 NOTE — TELEPHONE ENCOUNTER
Received MR req from [facility] for:  1. Radiology to get MRI disk. Faxed to Radiology. Fax confirmed. Copy sent to scanning. 2. All medical records. Sent to [employer]. Copy sent to scanning.

## 2019-01-14 ENCOUNTER — TELEPHONE (OUTPATIENT)
Dept: SCHEDULING | Age: 54
End: 2019-01-14

## 2019-01-24 NOTE — TELEPHONE ENCOUNTER
Received denial. Called Mukund and MARIA DE JESUS for Julio () to call back.      We need to know when patient is scheduled for VANDA as tentative surgery date is 2/12/2019

## 2019-02-01 NOTE — TELEPHONE ENCOUNTER
Spoke with patient. Patient states he has an appointment 2/4/2019 and will call us back after that appointment to determine the next step.

## 2019-02-01 NOTE — TELEPHONE ENCOUNTER
LMTCB. Need to ask patient when VANDA is scheduled. If VANDA is after 2/12/19 then surgery will need to be post-poned.

## 2019-02-05 NOTE — TELEPHONE ENCOUNTER
Spoke with patient again with Timothy Mercer ( - ID # [id number]). Patient states he did have VANDA completed and workman's comp states it will take 2-3 weeks to authorize surgery.      Advised patient we would need to postpone surgery until we receive written a

## 2019-02-05 NOTE — TELEPHONE ENCOUNTER
Spoke with patient and . Patient was advised that he needs to contact workman's comp and schedule an VANDA (Indivdiaual medical exam). Once exam is complete, workman's comp needs to review and determine if they will approve surgery.     Patient adv

## 2019-02-18 ENCOUNTER — LABORATORY ENCOUNTER (OUTPATIENT)
Dept: LAB | Facility: HOSPITAL | Age: 54
End: 2019-02-18
Attending: NEUROLOGICAL SURGERY
Payer: OTHER MISCELLANEOUS

## 2019-02-18 ENCOUNTER — HOSPITAL ENCOUNTER (OUTPATIENT)
Dept: GENERAL RADIOLOGY | Facility: HOSPITAL | Age: 54
Discharge: HOME | End: 2019-02-18
Attending: NEUROLOGICAL SURGERY
Payer: OTHER MISCELLANEOUS

## 2019-02-18 DIAGNOSIS — M54.12 CERVICAL RADICULOPATHY: ICD-10-CM

## 2019-02-18 LAB
ALBUMIN SERPL-MCNC: 4.2 G/DL (ref 3.4–5)
ALBUMIN/GLOB SERPL: 1.4 {RATIO} (ref 1–2)
ALP LIVER SERPL-CCNC: 74 U/L (ref 45–117)
ALT SERPL-CCNC: 28 U/L (ref 16–61)
ANION GAP SERPL CALC-SCNC: 6 MMOL/L (ref 0–18)
APTT PPP: 33.9 SECONDS (ref 26.1–34.6)
AST SERPL-CCNC: 20 U/L (ref 15–37)
BASOPHILS # BLD AUTO: 0.04 X10(3) UL (ref 0–0.2)
BASOPHILS NFR BLD AUTO: 0.7 %
BILIRUB SERPL-MCNC: 0.4 MG/DL (ref 0.1–2)
BUN BLD-MCNC: 15 MG/DL (ref 7–18)
BUN/CREAT SERPL: 18.8 (ref 10–20)
CALCIUM BLD-MCNC: 8.7 MG/DL (ref 8.5–10.1)
CHLORIDE SERPL-SCNC: 107 MMOL/L (ref 98–107)
CO2 SERPL-SCNC: 27 MMOL/L (ref 21–32)
CREAT BLD-MCNC: 0.8 MG/DL (ref 0.7–1.3)
DEPRECATED RDW RBC AUTO: 42.5 FL (ref 35.1–46.3)
EOSINOPHIL # BLD AUTO: 0.38 X10(3) UL (ref 0–0.7)
EOSINOPHIL NFR BLD AUTO: 6.4 %
ERYTHROCYTE [DISTWIDTH] IN BLOOD BY AUTOMATED COUNT: 12.2 % (ref 11–15)
GLOBULIN PLAS-MCNC: 3 G/DL (ref 2.8–4.4)
GLUCOSE BLD-MCNC: 88 MG/DL (ref 70–99)
HCT VFR BLD AUTO: 46.2 % (ref 39–53)
HGB BLD-MCNC: 15.7 G/DL (ref 13–17.5)
IMM GRANULOCYTES # BLD AUTO: 0.02 X10(3) UL (ref 0–1)
IMM GRANULOCYTES NFR BLD: 0.3 %
INR BLD: 1.07 (ref 0.9–1.1)
LYMPHOCYTES # BLD AUTO: 2.07 X10(3) UL (ref 1–4)
LYMPHOCYTES NFR BLD AUTO: 34.7 %
M PROTEIN MFR SERPL ELPH: 7.2 G/DL (ref 6.4–8.2)
MCH RBC QN AUTO: 32.1 PG (ref 26–34)
MCHC RBC AUTO-ENTMCNC: 34 G/DL (ref 31–37)
MCV RBC AUTO: 94.5 FL (ref 80–100)
MONOCYTES # BLD AUTO: 0.48 X10(3) UL (ref 0.1–1)
MONOCYTES NFR BLD AUTO: 8.1 %
NEUTROPHILS # BLD AUTO: 2.97 X10 (3) UL (ref 1.5–7.7)
NEUTROPHILS # BLD AUTO: 2.97 X10(3) UL (ref 1.5–7.7)
NEUTROPHILS NFR BLD AUTO: 49.8 %
OSMOLALITY SERPL CALC.SUM OF ELEC: 290 MOSM/KG (ref 275–295)
PLATELET # BLD AUTO: 193 10(3)UL (ref 150–450)
POTASSIUM SERPL-SCNC: 3.8 MMOL/L (ref 3.5–5.1)
PSA SERPL DL<=0.01 NG/ML-MCNC: 14.3 SECONDS (ref 12.4–14.7)
RBC # BLD AUTO: 4.89 X10(6)UL (ref 4.3–5.7)
SODIUM SERPL-SCNC: 140 MMOL/L (ref 136–145)
WBC # BLD AUTO: 6 X10(3) UL (ref 4–11)

## 2019-02-18 PROCEDURE — 80053 COMPREHEN METABOLIC PANEL: CPT

## 2019-02-18 PROCEDURE — 85610 PROTHROMBIN TIME: CPT

## 2019-02-18 PROCEDURE — 85730 THROMBOPLASTIN TIME PARTIAL: CPT

## 2019-02-18 PROCEDURE — 87081 CULTURE SCREEN ONLY: CPT

## 2019-02-18 PROCEDURE — 87147 CULTURE TYPE IMMUNOLOGIC: CPT

## 2019-02-18 PROCEDURE — 72052 X-RAY EXAM NECK SPINE 6/>VWS: CPT | Performed by: NEUROLOGICAL SURGERY

## 2019-02-18 PROCEDURE — 85025 COMPLETE CBC W/AUTO DIFF WBC: CPT

## 2019-02-18 PROCEDURE — 36415 COLL VENOUS BLD VENIPUNCTURE: CPT

## 2019-03-14 ENCOUNTER — APPOINTMENT (OUTPATIENT)
Dept: GENERAL RADIOLOGY | Facility: HOSPITAL | Age: 54
End: 2019-03-14
Attending: NEUROLOGICAL SURGERY
Payer: OTHER MISCELLANEOUS

## 2019-03-14 ENCOUNTER — HOSPITAL ENCOUNTER (OUTPATIENT)
Facility: HOSPITAL | Age: 54
Discharge: HOME | End: 2019-03-15
Attending: NEUROLOGICAL SURGERY | Admitting: NEUROLOGICAL SURGERY
Payer: OTHER MISCELLANEOUS

## 2019-03-14 ENCOUNTER — ANESTHESIA EVENT (OUTPATIENT)
Dept: SURGERY | Facility: HOSPITAL | Age: 54
End: 2019-03-14
Payer: OTHER MISCELLANEOUS

## 2019-03-14 ENCOUNTER — ANESTHESIA (OUTPATIENT)
Dept: SURGERY | Facility: HOSPITAL | Age: 54
End: 2019-03-14
Payer: OTHER MISCELLANEOUS

## 2019-03-14 DIAGNOSIS — M54.12 CERVICAL RADICULOPATHY: Primary | ICD-10-CM

## 2019-03-14 PROCEDURE — 76000 FLUOROSCOPY <1 HR PHYS/QHP: CPT | Performed by: NEUROLOGICAL SURGERY

## 2019-03-14 PROCEDURE — 99242 OFF/OP CONSLTJ NEW/EST SF 20: CPT | Performed by: HOSPITALIST

## 2019-03-14 PROCEDURE — 0RR30JZ REPLACEMENT OF CERVICAL VERTEBRAL DISC WITH SYNTHETIC SUBSTITUTE, OPEN APPROACH: ICD-10-PCS | Performed by: NEUROLOGICAL SURGERY

## 2019-03-14 PROCEDURE — 0RB30ZZ EXCISION OF CERVICAL VERTEBRAL DISC, OPEN APPROACH: ICD-10-PCS | Performed by: NEUROLOGICAL SURGERY

## 2019-03-14 DEVICE — PRESTIGE LP DISC 7X18MM
Type: IMPLANTABLE DEVICE | Site: NECK | Status: FUNCTIONAL
Brand: PRESTIGE® LP CERVICAL DISC SYSTEM

## 2019-03-14 RX ORDER — NALOXONE HYDROCHLORIDE 0.4 MG/ML
80 INJECTION, SOLUTION INTRAMUSCULAR; INTRAVENOUS; SUBCUTANEOUS AS NEEDED
Status: DISCONTINUED | OUTPATIENT
Start: 2019-03-14 | End: 2019-03-14 | Stop reason: HOSPADM

## 2019-03-14 RX ORDER — CEFAZOLIN SODIUM/WATER 2 G/20 ML
SYRINGE (ML) INTRAVENOUS
Status: DISPENSED
Start: 2019-03-14 | End: 2019-03-14

## 2019-03-14 RX ORDER — ACETAMINOPHEN 500 MG
1000 TABLET ORAL ONCE
Status: DISCONTINUED | OUTPATIENT
Start: 2019-03-14 | End: 2019-03-14

## 2019-03-14 RX ORDER — MORPHINE SULFATE 4 MG/ML
2 INJECTION, SOLUTION INTRAMUSCULAR; INTRAVENOUS EVERY 2 HOUR PRN
Status: DISCONTINUED | OUTPATIENT
Start: 2019-03-14 | End: 2019-03-15

## 2019-03-14 RX ORDER — HYDROMORPHONE HYDROCHLORIDE 1 MG/ML
INJECTION, SOLUTION INTRAMUSCULAR; INTRAVENOUS; SUBCUTANEOUS
Status: COMPLETED
Start: 2019-03-14 | End: 2019-03-14

## 2019-03-14 RX ORDER — DIPHENHYDRAMINE HYDROCHLORIDE 50 MG/ML
25 INJECTION INTRAMUSCULAR; INTRAVENOUS EVERY 4 HOURS PRN
Status: DISCONTINUED | OUTPATIENT
Start: 2019-03-14 | End: 2019-03-15

## 2019-03-14 RX ORDER — BISACODYL 10 MG
10 SUPPOSITORY, RECTAL RECTAL
Status: DISCONTINUED | OUTPATIENT
Start: 2019-03-14 | End: 2019-03-15

## 2019-03-14 RX ORDER — BACITRACIN 50000 [USP'U]/1
INJECTION, POWDER, LYOPHILIZED, FOR SOLUTION INTRAMUSCULAR AS NEEDED
Status: DISCONTINUED | OUTPATIENT
Start: 2019-03-14 | End: 2019-03-14

## 2019-03-14 RX ORDER — TIZANIDINE 2 MG/1
2 TABLET ORAL EVERY 8 HOURS PRN
Status: DISCONTINUED | OUTPATIENT
Start: 2019-03-14 | End: 2019-03-15

## 2019-03-14 RX ORDER — SODIUM CHLORIDE, SODIUM LACTATE, POTASSIUM CHLORIDE, CALCIUM CHLORIDE 600; 310; 30; 20 MG/100ML; MG/100ML; MG/100ML; MG/100ML
INJECTION, SOLUTION INTRAVENOUS CONTINUOUS
Status: DISCONTINUED | OUTPATIENT
Start: 2019-03-14 | End: 2019-03-14 | Stop reason: HOSPADM

## 2019-03-14 RX ORDER — CEFAZOLIN SODIUM/WATER 2 G/20 ML
2 SYRINGE (ML) INTRAVENOUS EVERY 8 HOURS
Status: COMPLETED | OUTPATIENT
Start: 2019-03-14 | End: 2019-03-14

## 2019-03-14 RX ORDER — SODIUM CHLORIDE, SODIUM LACTATE, POTASSIUM CHLORIDE, CALCIUM CHLORIDE 600; 310; 30; 20 MG/100ML; MG/100ML; MG/100ML; MG/100ML
INJECTION, SOLUTION INTRAVENOUS CONTINUOUS
Status: DISCONTINUED | OUTPATIENT
Start: 2019-03-14 | End: 2019-03-15

## 2019-03-14 RX ORDER — GABAPENTIN 300 MG/1
300 CAPSULE ORAL 2 TIMES DAILY
Status: DISCONTINUED | OUTPATIENT
Start: 2019-03-14 | End: 2019-03-15

## 2019-03-14 RX ORDER — SODIUM PHOSPHATE, DIBASIC AND SODIUM PHOSPHATE, MONOBASIC 7; 19 G/133ML; G/133ML
1 ENEMA RECTAL ONCE AS NEEDED
Status: DISCONTINUED | OUTPATIENT
Start: 2019-03-14 | End: 2019-03-15

## 2019-03-14 RX ORDER — DIPHENHYDRAMINE HCL 25 MG
25 CAPSULE ORAL EVERY 4 HOURS PRN
Status: DISCONTINUED | OUTPATIENT
Start: 2019-03-14 | End: 2019-03-15

## 2019-03-14 RX ORDER — DIPHENHYDRAMINE HYDROCHLORIDE 50 MG/ML
INJECTION INTRAMUSCULAR; INTRAVENOUS
Status: COMPLETED
Start: 2019-03-14 | End: 2019-03-14

## 2019-03-14 RX ORDER — DOCUSATE SODIUM 100 MG/1
100 CAPSULE, LIQUID FILLED ORAL 2 TIMES DAILY
Status: DISCONTINUED | OUTPATIENT
Start: 2019-03-14 | End: 2019-03-15

## 2019-03-14 RX ORDER — ONDANSETRON 2 MG/ML
INJECTION INTRAMUSCULAR; INTRAVENOUS
Status: COMPLETED
Start: 2019-03-14 | End: 2019-03-14

## 2019-03-14 RX ORDER — HYDROMORPHONE HYDROCHLORIDE 1 MG/ML
0.4 INJECTION, SOLUTION INTRAMUSCULAR; INTRAVENOUS; SUBCUTANEOUS EVERY 5 MIN PRN
Status: DISCONTINUED | OUTPATIENT
Start: 2019-03-14 | End: 2019-03-14 | Stop reason: HOSPADM

## 2019-03-14 RX ORDER — BUPIVACAINE HYDROCHLORIDE AND EPINEPHRINE 5; 5 MG/ML; UG/ML
INJECTION, SOLUTION EPIDURAL; INTRACAUDAL; PERINEURAL AS NEEDED
Status: DISCONTINUED | OUTPATIENT
Start: 2019-03-14 | End: 2019-03-14

## 2019-03-14 RX ORDER — HYDROCODONE BITARTRATE AND ACETAMINOPHEN 10; 325 MG/1; MG/1
1 TABLET ORAL EVERY 4 HOURS PRN
Status: DISCONTINUED | OUTPATIENT
Start: 2019-03-14 | End: 2019-03-15

## 2019-03-14 RX ORDER — CYCLOBENZAPRINE HCL 10 MG
10 TABLET ORAL 3 TIMES DAILY PRN
Status: DISCONTINUED | OUTPATIENT
Start: 2019-03-14 | End: 2019-03-15

## 2019-03-14 RX ORDER — METOCLOPRAMIDE HYDROCHLORIDE 5 MG/ML
10 INJECTION INTRAMUSCULAR; INTRAVENOUS EVERY 6 HOURS PRN
Status: DISCONTINUED | OUTPATIENT
Start: 2019-03-14 | End: 2019-03-15

## 2019-03-14 RX ORDER — DIPHENHYDRAMINE HYDROCHLORIDE 50 MG/ML
12.5 INJECTION INTRAMUSCULAR; INTRAVENOUS AS NEEDED
Status: DISCONTINUED | OUTPATIENT
Start: 2019-03-14 | End: 2019-03-14 | Stop reason: HOSPADM

## 2019-03-14 RX ORDER — ONDANSETRON 2 MG/ML
4 INJECTION INTRAMUSCULAR; INTRAVENOUS AS NEEDED
Status: DISCONTINUED | OUTPATIENT
Start: 2019-03-14 | End: 2019-03-14 | Stop reason: HOSPADM

## 2019-03-14 RX ORDER — ONDANSETRON 2 MG/ML
4 INJECTION INTRAMUSCULAR; INTRAVENOUS EVERY 4 HOURS PRN
Status: DISPENSED | OUTPATIENT
Start: 2019-03-14 | End: 2019-03-15

## 2019-03-14 RX ORDER — SENNOSIDES 8.6 MG
17.2 TABLET ORAL NIGHTLY
Status: DISCONTINUED | OUTPATIENT
Start: 2019-03-14 | End: 2019-03-15

## 2019-03-14 RX ORDER — TIZANIDINE 2 MG/1
2 TABLET ORAL EVERY 8 HOURS PRN
Status: ON HOLD | COMMUNITY
End: 2019-03-15

## 2019-03-14 RX ORDER — POLYETHYLENE GLYCOL 3350 17 G/17G
17 POWDER, FOR SOLUTION ORAL DAILY PRN
Status: DISCONTINUED | OUTPATIENT
Start: 2019-03-14 | End: 2019-03-15

## 2019-03-14 RX ORDER — MORPHINE SULFATE 4 MG/ML
4 INJECTION, SOLUTION INTRAMUSCULAR; INTRAVENOUS EVERY 2 HOUR PRN
Status: DISCONTINUED | OUTPATIENT
Start: 2019-03-14 | End: 2019-03-15

## 2019-03-14 RX ORDER — MORPHINE SULFATE 4 MG/ML
1 INJECTION, SOLUTION INTRAMUSCULAR; INTRAVENOUS EVERY 2 HOUR PRN
Status: DISCONTINUED | OUTPATIENT
Start: 2019-03-14 | End: 2019-03-15

## 2019-03-14 RX ORDER — CEFAZOLIN SODIUM/WATER 2 G/20 ML
2 SYRINGE (ML) INTRAVENOUS ONCE
Status: COMPLETED | OUTPATIENT
Start: 2019-03-14 | End: 2019-03-14

## 2019-03-14 RX ORDER — MIDAZOLAM HYDROCHLORIDE 1 MG/ML
1 INJECTION INTRAMUSCULAR; INTRAVENOUS EVERY 5 MIN PRN
Status: DISCONTINUED | OUTPATIENT
Start: 2019-03-14 | End: 2019-03-14 | Stop reason: HOSPADM

## 2019-03-14 RX ORDER — MEPERIDINE HYDROCHLORIDE 25 MG/ML
12.5 INJECTION INTRAMUSCULAR; INTRAVENOUS; SUBCUTANEOUS AS NEEDED
Status: DISCONTINUED | OUTPATIENT
Start: 2019-03-14 | End: 2019-03-14 | Stop reason: HOSPADM

## 2019-03-14 RX ORDER — CEFAZOLIN SODIUM/WATER 2 G/20 ML
2 SYRINGE (ML) INTRAVENOUS EVERY 8 HOURS
Status: DISCONTINUED | OUTPATIENT
Start: 2019-03-14 | End: 2019-03-14

## 2019-03-14 RX ORDER — SODIUM CHLORIDE 9 MG/ML
INJECTION, SOLUTION INTRAVENOUS CONTINUOUS
Status: DISCONTINUED | OUTPATIENT
Start: 2019-03-14 | End: 2019-03-15

## 2019-03-14 NOTE — H&P
Neurosurgery Pre-OP H&P  3/14/2019    Mirian Costello PCP:  Niurka Calvo MD    1965 MRN [id number]       HISTORY OF PRESENT ILLNESS:  Mirian Costello is a(n) 48year old male previously seen by Jay Guzman in our office.   He had a work injury i Cognition: alert and oriented x 3                Cranial nerves: Pupils equally round and reactive to light. EOMs intact. Face is symmetrical and sensation is intact. Tongue is midline.                   Motor: R T4 HG4+ IO4+, otherwise 5/5 x 4

## 2019-03-14 NOTE — ANESTHESIA PREPROCEDURE EVALUATION
PRE-OP EVALUATION    Patient Name: Arnaud Carson    Pre-op Diagnosis: Cervical radiculopathy [M54.12]    Procedure(s):  anterior cervical six-seven discectomy, arthroplasty    Surgeon(s) and Role:     Milli Arndt MD - Primary    Pre-op vitals review 12/17/2018    Performed by Aisha Soares MD at [facility] MAIN OR   • CHOLECYSTECTOMY     • OTHER SURGICAL HISTORY      \"ear surgery for hearing\"     Social History    Tobacco Use      Smoking status: Never Smoker      Smokeless tobacco: Never Used    Alcohol use

## 2019-03-14 NOTE — PROGRESS NOTES
Received at end of shift, AOx4. Rates pain 2/10, declines pain meds. C/o intermittent numbness to bilateral fingertips, more on right hand. IVF running. Pt voiding. Coverlet to anterior neck, cdi. Soft diet, tolerating well.   Belongings within reach,

## 2019-03-14 NOTE — BRIEF OP NOTE
Pre-Operative Diagnosis: Cervical radiculopathy [M54.12]     Post-Operative Diagnosis: Cervical radiculopathy [M54.12]      Procedure Performed:   Procedure(s):  anterior cervical six-seven discectomy, arthroplasty    Surgeon(s) and Role:     Jammie Gavin Dr

## 2019-03-14 NOTE — OPERATIVE REPORT
[facility]    OPERATIVE REPORT    Patient:  Graciela Yaun; YOB: 1965     CSN:  [id number]; Medical Record Number:  [id number]    Admission Date:  3/14/2019 Operation Date:  3/14/2019    . ..........................     Operating Physici fascia was incised sharply and prevertebral soft tissue was bluntly dissected to expose the anterior cervical spine from the right to left longus coli muscles at their anterior attachment to the spine. With use of fluoroscopy the C6-7  disc was identified. pedicles into the far lateral foramen. When needed epidural veins over the exiting roots were coagulated, at a very low setting of 5, by very carefully lifting them off the dura of the root with a right angled bipolar forceps. The surgical assistant provide and FloSeal surgical coagulant. The esophagus and respiratory apparatus were then gently repositioned over the midline.  The platysma was then closed with a running 3-0 Vicryl suture to bring the associated sternocleidomastoid muscle back into anatomic posi

## 2019-03-14 NOTE — ANESTHESIA POSTPROCEDURE EVALUATION
[address] Patient Status:  Outpatient in a Bed   Age/Gender 48year old male MRN [id number]   Location [facility] Attending Para Libman, [address] [facility] Se Day # 0 PCP Niurka Calvo MD       Anesthesia Post-op Note    Pr

## 2019-03-14 NOTE — CONSULTS
[facility] HOSPITALIST  CONSULT     Misael Iqbal Patient Status:  Outpatient in a Bed    1965 MRN [id number]   [facility] 3SW-A Attending Priscila Wynn MD   Hosp Day # 0 PCP Leon Tom MD     Reason for consult: Medical management Take 1 capsule (300 mg total) by mouth 2 (two) times daily. Disp: 60 capsule Rfl: 0   tiZANidine HCl 2 MG Oral Tab Take 2 mg by mouth every 8 (eight) hours as needed.  Disp:  Rfl:        Review of Systems:   A comprehensive 14 point review of systems was co no    Plan of care discussed with patient and family at bedside.       Abhijeet Mercado DO  3/14/2019

## 2019-03-15 ENCOUNTER — APPOINTMENT (OUTPATIENT)
Dept: GENERAL RADIOLOGY | Facility: HOSPITAL | Age: 54
End: 2019-03-15
Attending: NEUROLOGICAL SURGERY
Payer: OTHER MISCELLANEOUS

## 2019-03-15 VITALS
OXYGEN SATURATION: 93 % | SYSTOLIC BLOOD PRESSURE: 132 MMHG | WEIGHT: 167.44 LBS | TEMPERATURE: 98 F | RESPIRATION RATE: 18 BRPM | BODY MASS INDEX: 26.91 KG/M2 | DIASTOLIC BLOOD PRESSURE: 87 MMHG | HEART RATE: 80 BPM | HEIGHT: 66 IN

## 2019-03-15 PROCEDURE — 72040 X-RAY EXAM NECK SPINE 2-3 VW: CPT | Performed by: NEUROLOGICAL SURGERY

## 2019-03-15 PROCEDURE — 99225 SUBSEQUENT OBSERVATION CARE: CPT | Performed by: HOSPITALIST

## 2019-03-15 RX ORDER — HYDROCODONE BITARTRATE AND ACETAMINOPHEN 10; 325 MG/1; MG/1
1-2 TABLET ORAL EVERY 6 HOURS PRN
Qty: 60 TABLET | Refills: 0 | Status: SHIPPED | OUTPATIENT
Start: 2019-03-15 | End: 2019-05-06 | Stop reason: ALTCHOICE

## 2019-03-15 RX ORDER — TIZANIDINE 2 MG/1
2 TABLET ORAL EVERY 8 HOURS PRN
Qty: 60 TABLET | Refills: 0 | Status: SHIPPED | OUTPATIENT
Start: 2019-03-15 | End: 2019-05-06

## 2019-03-15 NOTE — PHYSICAL THERAPY NOTE
PHYSICAL THERAPY QUICK EVALUATION - INPATIENT    Room Number: 377/377-A  Evaluation Date: 3/15/2019  Presenting Problem: S/p Anterior C6-7 Discectomy Arthroplasty on 03/14/19  Physician Order: PT Eval and Treat    Problem List  Active Problems:    * No a mechanics;Breathing techniques;Relaxation;Repositioning   RANGE OF MOTION AND STRENGTH ASSESSMENT  Upper extremity ROM and strength are within functional limits except bilateral shoulder ROM limited after mid range with c/o pain, Right UE strength slightly manage stairs independently. Patient was able to recall post surgical precautions independently @ end of session. Patient End of Session: Up in chair;Needs met;Call light within reach;RN aware of session/findings; All patient questions and concerns ad

## 2019-03-15 NOTE — PROGRESS NOTES
[facility]  Neurosurgery Progress Note    Ha Citizen Patient Status:  Outpatient in a Bed    1965 MRN [id number]   [facility] 3SW-A Attending Derrick Tucker MD   Hosp Day # 0 PCP Kari Jackson MD     Chief Complaint:  C7 radi CHUCK  [facility]  3/15/2019, 12:04 PM      I have seen and examined this patient and agree with the findings documented above. Improved right arm pain, strength stable. X-rays with appropriate position of implant.      Cristiana Martinez MD

## 2019-03-15 NOTE — PROGRESS NOTES
[facility] HOSPITALIST  Progress Note     Fidel Rubinstein Patient Status:  Outpatient in a Bed    1965 MRN [id number]   [facility] 3SW-A Attending Ita Cali MD   Hosp Day # 0 PCP Gussie Holstein, MD     Reason for consult: Medical Manage ASSESSMENT / PLAN:     1. Cervical radiculopathy s/p ACDF  1. Management per NS  2. Pain control  3. PT  2. Disposition  1.  [id number] Evette Morrow for d/c from hospitalist standpoint     Quality:  · DVT Prophylaxis: SCD  · CODE status: full  · Johnson: no  · Central line:

## 2019-03-16 NOTE — PLAN OF CARE
Order received to discharge pt, vitals stable, pain well controlled on PO pain meds, discharge instructions provided to patient in [address], verbalized understanding. IV site discontinued, scrips for pain meds and muscle relaxant provided.   Discharge to [facility]

## 2019-03-18 ENCOUNTER — TELEPHONE (OUTPATIENT)
Dept: SURGERY | Facility: CLINIC | Age: 54
End: 2019-03-18

## 2019-03-18 NOTE — OCCUPATIONAL THERAPY NOTE
OCCUPATIONAL THERAPY QUICK EVALUATION - INPATIENT      DATE OF SERVICE 3/15/19    Room Number: 377/377-A  Evaluation Date: 3/18/2019     Type of Evaluation: Quick Eval  Presenting Problem: (C6-7 discectomy, arthroplasty)    Physician Order: IP Consult to Maykel Hidalgo Bearing Restriction: None                PAIN ASSESSMENT  Ratin  Location: (incision)  Management Techniques:  Activity promotion    COGNITION  wfl    RANGE OF MOTION AND STRENGTH ASSESSMENT  Upper extremity ROM is within functional limits    Upper extr Understanding voiced. Patient End of Session: Up in chair;Needs met;Call light within reach;RN aware of session/findings; All patient questions and concerns addressed; Family present    ASSESSMENT     Patient is a 48year old male admitted on 3/14/2019

## 2019-03-18 NOTE — TELEPHONE ENCOUNTER
Received fax from Pt's pharmacy stating that they needed clarification on medication Norco  Mg     Called Pt's preferred [facility] pharmacy.      Spoke to Pharmacist     Pharmacist states that they are refilling medication for a total of 35 tablets for

## 2019-03-18 NOTE — TELEPHONE ENCOUNTER
Called and spoke to Kyleigh Pt's daughter Lucian Sanchez per HIPAA  Pt relayed message below to Pt's daughter. Nothing further needed.

## 2019-03-18 NOTE — TELEPHONE ENCOUNTER
Pt doesn't speak English well, pt's daughter calling, states rx hydrocodone not ready at pharmacy, pharmacy directed pt to call our office, pt & daughter unsure what is needed, asking for call back & direction

## 2019-03-20 ENCOUNTER — TELEPHONE (OUTPATIENT)
Dept: SURGERY | Facility: CLINIC | Age: 54
End: 2019-03-20

## 2019-03-20 NOTE — TELEPHONE ENCOUNTER
Received multiple faxes form Pt's preferred pharmacy. Spoke to Pharmacy as to faxes. Pharmacy states that everything was ok on their end and nothing further was needed.

## 2019-03-26 ENCOUNTER — OFFICE VISIT (OUTPATIENT)
Dept: FAMILY MEDICINE CLINIC | Facility: CLINIC | Age: 54
End: 2019-03-26
Payer: OTHER MISCELLANEOUS

## 2019-03-26 VITALS
BODY MASS INDEX: 27 KG/M2 | TEMPERATURE: 97 F | HEART RATE: 86 BPM | SYSTOLIC BLOOD PRESSURE: 120 MMHG | WEIGHT: 167.81 LBS | DIASTOLIC BLOOD PRESSURE: 70 MMHG | RESPIRATION RATE: 14 BRPM

## 2019-03-26 DIAGNOSIS — M54.12 CERVICAL RADICULOPATHY: Primary | ICD-10-CM

## 2019-03-26 DIAGNOSIS — Z98.890 S/P CERVICAL DISC REPLACEMENT: ICD-10-CM

## 2019-03-26 PROCEDURE — 99213 OFFICE O/P EST LOW 20 MIN: CPT | Performed by: FAMILY MEDICINE

## 2019-03-29 ENCOUNTER — OFFICE VISIT (OUTPATIENT)
Dept: SURGERY | Facility: CLINIC | Age: 54
End: 2019-03-29
Payer: OTHER MISCELLANEOUS

## 2019-03-29 VITALS — SYSTOLIC BLOOD PRESSURE: 124 MMHG | DIASTOLIC BLOOD PRESSURE: 74 MMHG | HEART RATE: 82 BPM

## 2019-03-29 DIAGNOSIS — Z98.890 S/P CERVICAL DISC REPLACEMENT: ICD-10-CM

## 2019-03-29 DIAGNOSIS — R07.9 CHEST PAIN, UNSPECIFIED TYPE: ICD-10-CM

## 2019-03-29 DIAGNOSIS — M54.12 CERVICAL RADICULOPATHY: Primary | ICD-10-CM

## 2019-03-29 DIAGNOSIS — M50.10 HERNIATION OF CERVICAL INTERVERTEBRAL DISC WITH RADICULOPATHY: ICD-10-CM

## 2019-03-29 PROCEDURE — 99024 POSTOP FOLLOW-UP VISIT: CPT | Performed by: PHYSICIAN ASSISTANT

## 2019-03-29 RX ORDER — TIZANIDINE 2 MG/1
2 TABLET ORAL EVERY 6 HOURS PRN
Qty: 30 TABLET | Refills: 0 | Status: SHIPPED | OUTPATIENT
Start: 2019-03-29 | End: 2019-05-06 | Stop reason: ALTCHOICE

## 2019-03-29 RX ORDER — HYDROCODONE BITARTRATE AND ACETAMINOPHEN 10; 325 MG/1; MG/1
1 TABLET ORAL EVERY 8 HOURS PRN
Qty: 40 TABLET | Refills: 0 | Status: SHIPPED | OUTPATIENT
Start: 2019-03-29 | End: 2019-09-20 | Stop reason: ALTCHOICE

## 2019-03-29 NOTE — PROGRESS NOTES
Neurosurgery Clinic Visit  3/29/2019    [facility] PCP:  Rika Tyler MD    1965 MRN [id number]       CC:  S/P C6-7 ADR 3/14/19 Dr. Latricia Forrest    HPI:    Sudha Barragan is here for 2 week post op appt. He is doing well.   His right arm symptoms are mu Heart Disorder in his brother, father, and mother.     SOCIAL HISTORY:   reports that  has never smoked. he has never used smokeless tobacco. He reports that he drinks about 1.8 oz of alcohol per week.  He reports that he does not use drugs.     ALLERGIES: and help with sleep. XR Cervical flex/ex to be completed before next appointment.     Micheal Villatoro PA-C  [facility]  3/29/2019  12:13 PM

## 2019-03-29 NOTE — PROGRESS NOTES
Pt is here for post op  anterior cervical six-seven discectomy, arthroplasty SX DATE: 3/14/19     Refill of tizanidine and [place].

## 2019-04-01 PROBLEM — Z98.890 S/P CERVICAL DISC REPLACEMENT: Status: ACTIVE | Noted: 2019-04-01

## 2019-04-01 NOTE — PROGRESS NOTES
Derrek Marcelino is a 48year old male. HPI:   Patient here to f/u from his recent C6-C7 ADR on 3/14/19 with Dr. Latricia Forrest. It sounds like everything went well/routinely, and patient feeling so much better.  Has discomfort from surgery, but arm symtpoms impr Brother         CABG      Social History    Tobacco Use      Smoking status: Never Smoker      Smokeless tobacco: Never Used    Alcohol use: Yes      Alcohol/week: 0.0 oz      Frequency: Never      Comment: occasional (rare at a party)    Drug use:  No

## 2019-04-02 ENCOUNTER — TELEPHONE (OUTPATIENT)
Dept: SURGERY | Facility: CLINIC | Age: 54
End: 2019-04-02

## 2019-04-03 ENCOUNTER — HOSPITAL ENCOUNTER (OUTPATIENT)
Dept: GENERAL RADIOLOGY | Facility: HOSPITAL | Age: 54
Discharge: HOME | End: 2019-04-03
Attending: PHYSICIAN ASSISTANT
Payer: OTHER MISCELLANEOUS

## 2019-04-03 DIAGNOSIS — R07.9 CHEST PAIN, UNSPECIFIED TYPE: ICD-10-CM

## 2019-04-03 PROCEDURE — 71046 X-RAY EXAM CHEST 2 VIEWS: CPT | Performed by: PHYSICIAN ASSISTANT

## 2019-05-03 ENCOUNTER — TELEPHONE (OUTPATIENT)
Dept: SURGERY | Facility: CLINIC | Age: 54
End: 2019-05-03

## 2019-05-03 ENCOUNTER — HOSPITAL ENCOUNTER (OUTPATIENT)
Dept: GENERAL RADIOLOGY | Facility: HOSPITAL | Age: 54
Discharge: HOME | End: 2019-05-03
Attending: PHYSICIAN ASSISTANT
Payer: OTHER MISCELLANEOUS

## 2019-05-03 DIAGNOSIS — Z98.890 S/P CERVICAL DISC REPLACEMENT: ICD-10-CM

## 2019-05-03 DIAGNOSIS — M54.12 CERVICAL RADICULOPATHY: ICD-10-CM

## 2019-05-03 DIAGNOSIS — M50.10 HERNIATION OF CERVICAL INTERVERTEBRAL DISC WITH RADICULOPATHY: ICD-10-CM

## 2019-05-03 PROCEDURE — 72052 X-RAY EXAM NECK SPINE 6/>VWS: CPT | Performed by: PHYSICIAN ASSISTANT

## 2019-05-03 NOTE — TELEPHONE ENCOUNTER
Called and spoke to Pt. Relayed message below. Pt reminder to obtain imaging Xray of the cervical before the next office visit with Dr Mayo Box. Pt was appreciative for the reminder.

## 2019-05-06 ENCOUNTER — TELEPHONE (OUTPATIENT)
Dept: SURGERY | Facility: CLINIC | Age: 54
End: 2019-05-06

## 2019-05-06 ENCOUNTER — OFFICE VISIT (OUTPATIENT)
Dept: SURGERY | Facility: CLINIC | Age: 54
End: 2019-05-06
Payer: OTHER MISCELLANEOUS

## 2019-05-06 VITALS — HEART RATE: 80 BPM | SYSTOLIC BLOOD PRESSURE: 120 MMHG | DIASTOLIC BLOOD PRESSURE: 70 MMHG

## 2019-05-06 DIAGNOSIS — Z98.890 S/P CERVICAL DISC REPLACEMENT: Primary | ICD-10-CM

## 2019-05-06 PROCEDURE — 99024 POSTOP FOLLOW-UP VISIT: CPT | Performed by: NEUROLOGICAL SURGERY

## 2019-05-06 RX ORDER — TIZANIDINE 2 MG/1
2 TABLET ORAL EVERY 8 HOURS PRN
Qty: 60 TABLET | Refills: 0 | Status: SHIPPED | OUTPATIENT
Start: 2019-05-06 | End: 2019-05-06

## 2019-05-06 RX ORDER — TIZANIDINE 2 MG/1
2 TABLET ORAL EVERY 8 HOURS PRN
Qty: 60 TABLET | Refills: 0 | Status: SHIPPED | OUTPATIENT
Start: 2019-05-06 | End: 2019-08-07

## 2019-05-06 NOTE — PROGRESS NOTES
Pt is here for post op  anterior cervical six-seven discectomy, arthroplasty SX DATE: 3/14/19   Xray  Obtained. Pt states that he has been still having pain in the back and the front of the neck.

## 2019-05-06 NOTE — PROGRESS NOTES
[facility]  Neurosurgery Clinic Visit      HISTORY OF PRESENT ILLNESS:  Lory Boo is a(n) 48year old male s/p 3/14/19 C6-7 arthroplasty.  He is overall doing well, notes near complete resolution of right arm pain, but still has ne Cranial nerves: Pupils equally round and reactive to light. EOMs intact. Face is symmetrical and sensation is intact. Tongue is midline.      Motor: 5/5 x 4   Sensation: intact to light touch bilaterally     Reflexes: 1+, no clonus, no marrero's   Coord

## 2019-05-06 NOTE — TELEPHONE ENCOUNTER
Patient in office today with Dr Anthony Kerns. Tizanidine Rx ordered and sent to [facility] in [place] but patient request Rx to be sent to [facility] [address] in [place]. Rx reordered and sent to [facility] in [place]. [address] and cancelled Rx.      Receip

## 2019-05-07 ENCOUNTER — TELEPHONE (OUTPATIENT)
Dept: SURGERY | Facility: CLINIC | Age: 54
End: 2019-05-07

## 2019-05-09 ENCOUNTER — TELEPHONE (OUTPATIENT)
Dept: SURGERY | Facility: CLINIC | Age: 54
End: 2019-05-09

## 2019-05-09 NOTE — TELEPHONE ENCOUNTER
Called Rob Melendrez back from [place] to answer her question regarding Dr. Paramjit Urrutia restrictions / goals for PT.   Relayed information per Dr. Paramjit Urrutia office note from 05/05/2019    PLAN:  -start PT for neck ROM, arm strength    Rob Melendrez thanked me for the

## 2019-05-09 NOTE — TELEPHONE ENCOUNTER
Physical therapist needs to know if Dr. Aiden Hernandez has any restrictions or specific goals for PT on patients after surgery. Please call.

## 2019-05-10 ENCOUNTER — TELEPHONE (OUTPATIENT)
Dept: SURGERY | Facility: CLINIC | Age: 54
End: 2019-05-10

## 2019-05-23 ENCOUNTER — TELEPHONE (OUTPATIENT)
Dept: SURGERY | Facility: CLINIC | Age: 54
End: 2019-05-23

## 2019-06-21 ENCOUNTER — TELEPHONE (OUTPATIENT)
Dept: SURGERY | Facility: CLINIC | Age: 54
End: 2019-06-21

## 2019-06-24 ENCOUNTER — OFFICE VISIT (OUTPATIENT)
Dept: SURGERY | Facility: CLINIC | Age: 54
End: 2019-06-24
Payer: OTHER MISCELLANEOUS

## 2019-06-24 VITALS — DIASTOLIC BLOOD PRESSURE: 70 MMHG | SYSTOLIC BLOOD PRESSURE: 118 MMHG | HEART RATE: 82 BPM

## 2019-06-24 DIAGNOSIS — Z98.1 STATUS POST CERVICAL SPINAL ARTHRODESIS: ICD-10-CM

## 2019-06-24 DIAGNOSIS — M79.601 RIGHT ARM PAIN: Primary | ICD-10-CM

## 2019-06-24 PROCEDURE — 99024 POSTOP FOLLOW-UP VISIT: CPT | Performed by: PHYSICIAN ASSISTANT

## 2019-06-24 RX ORDER — TIZANIDINE 2 MG/1
2 TABLET ORAL 2 TIMES DAILY PRN
Qty: 60 TABLET | Refills: 0 | Status: SHIPPED | OUTPATIENT
Start: 2019-06-24 | End: 2019-08-07

## 2019-06-24 RX ORDER — DICLOFENAC SODIUM 75 MG/1
75 TABLET, DELAYED RELEASE ORAL 2 TIMES DAILY
Qty: 60 TABLET | Refills: 0 | Status: SHIPPED | OUTPATIENT
Start: 2019-06-24 | End: 2019-08-07

## 2019-06-24 NOTE — PROGRESS NOTES
Pt is here for follow up. anterior cervical six-seven discectomy, arthroplasty SX DATE: 3/14/19       PT for rom: Pt states that he has been doing PT 2 TO 3 days weekly.      Pt states that he has been better with range of motion and strengthening

## 2019-06-24 NOTE — PROGRESS NOTES
Neurosurgery Clinic Visit  2019    Misael Iqbal PCP:  Leon Tom MD    1965 MRN [id number]       CC:  S/p C6-7 ADR 3/14/19    HPI:    Rashaun Miramontes is here for 3 month post op f/u. His son is here to translate.   He has had some recurrence of smoked. He has never used smokeless tobacco. He reports that he drinks alcohol.  He reports that he does not use drugs.     ALLERGIES:  No Known Allergies     MEDICATIONS:    Prescriptions Prior to Admission      (Not in a hospital admission)        No curr , patient describes persistent neck and new arm symptoms as above, recommend the new imaging to evaluate the source of his pain.      Joy Medley MD  Neurological Surgeon  [facility]  [address], [address],

## 2019-06-26 ENCOUNTER — TELEPHONE (OUTPATIENT)
Dept: FAMILY MEDICINE CLINIC | Facility: CLINIC | Age: 54
End: 2019-06-26

## 2019-06-26 NOTE — TELEPHONE ENCOUNTER
Pt would like to get in to see [facility] tomorrow. He has pain  In his left knee. He only wants to see her because she speaks Mexican.    Please return call to [phone]

## 2019-06-26 NOTE — TELEPHONE ENCOUNTER
Pain in the left knee no injury  Advised can be seen at 1145 tomorrow- he is agreeable    Future Appointments   Date Time Provider Agnes Rocha   6/27/2019 11:30 AM Jorje More MD [facility] ASHLEY Ingram   8/7/2019  9:00 AM MD Isabella Beltrán

## 2019-06-27 ENCOUNTER — OFFICE VISIT (OUTPATIENT)
Dept: FAMILY MEDICINE CLINIC | Facility: CLINIC | Age: 54
End: 2019-06-27
Payer: COMMERCIAL

## 2019-06-27 VITALS
SYSTOLIC BLOOD PRESSURE: 120 MMHG | DIASTOLIC BLOOD PRESSURE: 80 MMHG | OXYGEN SATURATION: 98 % | RESPIRATION RATE: 14 BRPM | HEART RATE: 90 BPM | BODY MASS INDEX: 28 KG/M2 | TEMPERATURE: 97 F | WEIGHT: 172.81 LBS

## 2019-06-27 DIAGNOSIS — M25.462 SWELLING OF JOINT, KNEE, LEFT: ICD-10-CM

## 2019-06-27 DIAGNOSIS — M25.562 ACUTE PAIN OF LEFT KNEE: Primary | ICD-10-CM

## 2019-06-27 PROCEDURE — 99213 OFFICE O/P EST LOW 20 MIN: CPT | Performed by: FAMILY MEDICINE

## 2019-06-27 NOTE — PROGRESS NOTES
Vikarm Connell is a 48year old male. HPI:   C/o L knee pain since Monday, worse in morning, red, swollen, hot. No injury. No fever/chills. Today a little better but still uncomfortable.   Has a pain doc for neck, takes norco 5 once in awhile (last 5 da Frequency: Never      Comment: occasional (rare at a party)    Drug use: No         REVIEW OF SYSTEMS:   GENERAL HEALTH: feels well otherwise  SKIN: denies any unusual skin lesions or rashes except mild redness/warmth of left knee (better today)  RESPIRATO

## 2019-07-08 ENCOUNTER — HOSPITAL ENCOUNTER (OUTPATIENT)
Dept: GENERAL RADIOLOGY | Facility: HOSPITAL | Age: 54
Discharge: HOME | End: 2019-07-08
Attending: PHYSICIAN ASSISTANT
Payer: OTHER MISCELLANEOUS

## 2019-07-08 ENCOUNTER — HOSPITAL ENCOUNTER (OUTPATIENT)
Dept: MRI IMAGING | Facility: HOSPITAL | Age: 54
Discharge: HOME | End: 2019-07-08
Attending: PHYSICIAN ASSISTANT
Payer: OTHER MISCELLANEOUS

## 2019-07-08 DIAGNOSIS — Z98.1 STATUS POST CERVICAL SPINAL ARTHRODESIS: ICD-10-CM

## 2019-07-08 DIAGNOSIS — M79.601 RIGHT ARM PAIN: ICD-10-CM

## 2019-07-08 PROCEDURE — 72052 X-RAY EXAM NECK SPINE 6/>VWS: CPT | Performed by: PHYSICIAN ASSISTANT

## 2019-07-08 PROCEDURE — 72141 MRI NECK SPINE W/O DYE: CPT | Performed by: PHYSICIAN ASSISTANT

## 2019-08-07 ENCOUNTER — OFFICE VISIT (OUTPATIENT)
Dept: SURGERY | Facility: CLINIC | Age: 54
End: 2019-08-07
Payer: OTHER MISCELLANEOUS

## 2019-08-07 VITALS — HEART RATE: 84 BPM | SYSTOLIC BLOOD PRESSURE: 118 MMHG | DIASTOLIC BLOOD PRESSURE: 68 MMHG

## 2019-08-07 DIAGNOSIS — Z98.890 S/P CERVICAL DISCECTOMY: Primary | ICD-10-CM

## 2019-08-07 PROCEDURE — 99213 OFFICE O/P EST LOW 20 MIN: CPT | Performed by: NEUROLOGICAL SURGERY

## 2019-08-07 RX ORDER — DICLOFENAC SODIUM 75 MG/1
75 TABLET, DELAYED RELEASE ORAL 2 TIMES DAILY
Qty: 60 TABLET | Refills: 0 | Status: SHIPPED | OUTPATIENT
Start: 2019-08-07 | End: 2019-10-14 | Stop reason: DRUGHIGH

## 2019-08-07 RX ORDER — TIZANIDINE 2 MG/1
2 TABLET ORAL 2 TIMES DAILY PRN
Qty: 60 TABLET | Refills: 0 | Status: SHIPPED | OUTPATIENT
Start: 2019-08-07 | End: 2019-12-06

## 2019-08-07 NOTE — PROGRESS NOTES
[facility]  Neurosurgery Clinic Visit      HISTORY OF PRESENT ILLNESS:  Gwen Ingram is a(n) 48year old male s/p 3/14/19 C6-7 arthroplasty.  He continues to note aching, sometimes sharper pain in the anterior lower neck/upper chest a Cognition: alert and oriented x 3    Cranial nerves: Pupils equally round and reactive to light. EOMs intact. Face is symmetrical and sensation is intact. Tongue is midline.      Motor: 5/5 x 4   Sensation: intact to light touch bilaterally     Reflex

## 2019-08-12 ENCOUNTER — TELEPHONE (OUTPATIENT)
Dept: SURGERY | Facility: CLINIC | Age: 54
End: 2019-08-12

## 2019-08-12 NOTE — TELEPHONE ENCOUNTER
Per RN, just send medical records; physician statement is in assessment and plan. Faxed OV note 8/7/19. Faxed to [phone] with MRI cervical spine 7/8/19. Fax confirmed. Request sent to scanning.

## 2019-09-20 ENCOUNTER — MED REC SCAN ONLY (OUTPATIENT)
Dept: PAIN CLINIC | Facility: CLINIC | Age: 54
End: 2019-09-20

## 2019-09-20 ENCOUNTER — OFFICE VISIT (OUTPATIENT)
Dept: PHYSICAL MEDICINE AND REHAB | Facility: CLINIC | Age: 54
End: 2019-09-20
Payer: OTHER MISCELLANEOUS

## 2019-09-20 VITALS
HEART RATE: 85 BPM | DIASTOLIC BLOOD PRESSURE: 70 MMHG | SYSTOLIC BLOOD PRESSURE: 120 MMHG | OXYGEN SATURATION: 94 % | HEIGHT: 66 IN | BODY MASS INDEX: 28.61 KG/M2 | WEIGHT: 178 LBS

## 2019-09-20 DIAGNOSIS — M54.12 CERVICAL RADICULOPATHY: Primary | ICD-10-CM

## 2019-09-20 PROCEDURE — 99244 OFF/OP CNSLTJ NEW/EST MOD 40: CPT | Performed by: PHYSICAL MEDICINE & REHABILITATION

## 2019-09-20 RX ORDER — DOXEPIN HYDROCHLORIDE 10 MG/1
10 CAPSULE ORAL NIGHTLY
Qty: 30 CAPSULE | Refills: 0 | Status: SHIPPED | OUTPATIENT
Start: 2019-09-20 | End: 2019-10-11

## 2019-09-20 NOTE — H&P
[address] [place] [address]    History and Physical    [facility] Patient Status:  No patient class for patient encounter    1965 MRN [id number]   Location [address] [place] [address], [address] [place] [address] Attending No att. ARTHROPLASTY 1 LEVEL Bilateral 3/14/2019    Performed by Andreea Hernandez MD at [address]   • CERVICAL EPIDURAL STEROID INJECTION N/A 12/26/2018    Performed by Emigdio Sandoval MD at [address]   • CERVICAL EPIDURAL STEROID INJECTION N/A 12/17/2018    Performe Neurological:   Strength testing: normal muscle bulk in the bilateral upper extremities. 4+/5 biceps, triceps. Otherwise 5/5 UE bilaterally    Sensation: Decreased to LT right lateral arm and palmar aspect of the right thumb.     Reflexes: 2/4 biceps, bra stenosis. C7-T1:  There is no significant abnormality.        =====  CONCLUSION:       1. Interval cervical arthroplasty changes at C6-7.     2. No significant spinal canal stenosis at any level in the cervical spine.      3. Mild right neural foraminal

## 2019-09-20 NOTE — PROGRESS NOTES
PHYSICAL EXAM:   Physical Exam   Constitutional:           Patient presents in office today with reported pain in right shlouder  Current pain level reported = 5/10    Location of Pain: right shlouder    Date Pain Began: 11/19/18          Work Related:

## 2019-09-20 NOTE — PROGRESS NOTES
Patient presents in office today with reported pain in ***    Current pain level reported = ***/10    Last reported dose of Hydrocodone

## 2019-10-03 ENCOUNTER — TELEPHONE (OUTPATIENT)
Dept: SURGERY | Facility: CLINIC | Age: 54
End: 2019-10-03

## 2019-10-03 NOTE — TELEPHONE ENCOUNTER
Pt. Called in regards to previous call . He is also concerned because the company  Is telling him he can return to work with restrictions . Please advise. Pt request a Swedish speaker. Spoke to RN- and reiterated  What Dr. Faisal Atkins said.  Pt said he

## 2019-10-03 NOTE — TELEPHONE ENCOUNTER
Reviewed pt message with DR Dileep Haley. Per Dr Dileep Haley he will continue to be involved in pt care but is unsure that return to work with restrictions can be changed. Detailed message left with request for follow up appt to be made.

## 2019-10-03 NOTE — TELEPHONE ENCOUNTER
Pt unsure what he should do, please advise,  Pt is currently on meds that Dr Eron Santos. prescibed and pt states meds are working for him but makes him tired and he sleeps a lot, his work hours are 2 pm-10 pm

## 2019-10-08 ENCOUNTER — TELEPHONE (OUTPATIENT)
Dept: SURGERY | Facility: CLINIC | Age: 54
End: 2019-10-08

## 2019-10-08 ENCOUNTER — TELEPHONE (OUTPATIENT)
Dept: NEUROLOGY | Facility: CLINIC | Age: 54
End: 2019-10-08

## 2019-10-08 NOTE — TELEPHONE ENCOUNTER
Can we please call patient for a f/u for re-assessment. Thank you so much. He can see a PA or Dr. Brendon Babcock.

## 2019-10-08 NOTE — TELEPHONE ENCOUNTER
rcvd med rec req from  for 9/20/19 work status & OV from Dr Jason Everett. Faxed back to  that Dr Jason Everett didn't see patient on this date.  Fax confirmed

## 2019-10-08 NOTE — TELEPHONE ENCOUNTER
Medrol Dosepak, stop the diclofenac. Keep follow up appointment this Friday. See if you can find out who this Workers Comp physician is who told him to go back to work, or see if you can find out who his  is so we can find out what's going on.

## 2019-10-08 NOTE — TELEPHONE ENCOUNTER
He was told by the work comp Dr to return to work on 10/7/19, but Dr Jordan Stiles wanted him to go back to work until he released him. Pt went back to work and is now with shoulder and arm pain, wants advise on what to do, or if he should continue working.  Pt i

## 2019-10-08 NOTE — TELEPHONE ENCOUNTER
He was told by the work comp Dr to return to work on 10/7/19, but Dr Demetrius Main wanted him to go back to work until he released him. Pt went back to work and is now with shoulder and arm pain, wants advise on what to do, or if he should continue working.  Pt i

## 2019-10-11 ENCOUNTER — TELEPHONE (OUTPATIENT)
Dept: SURGERY | Facility: CLINIC | Age: 54
End: 2019-10-11

## 2019-10-11 ENCOUNTER — OFFICE VISIT (OUTPATIENT)
Dept: PHYSICAL MEDICINE AND REHAB | Facility: CLINIC | Age: 54
End: 2019-10-11
Payer: OTHER MISCELLANEOUS

## 2019-10-11 VITALS
HEIGHT: 66 IN | WEIGHT: 168 LBS | DIASTOLIC BLOOD PRESSURE: 82 MMHG | BODY MASS INDEX: 27 KG/M2 | SYSTOLIC BLOOD PRESSURE: 130 MMHG

## 2019-10-11 DIAGNOSIS — M25.511 RIGHT SHOULDER PAIN, UNSPECIFIED CHRONICITY: Primary | ICD-10-CM

## 2019-10-11 DIAGNOSIS — M54.12 CERVICAL RADICULOPATHY: ICD-10-CM

## 2019-10-11 PROCEDURE — 99214 OFFICE O/P EST MOD 30 MIN: CPT | Performed by: PHYSICAL MEDICINE & REHABILITATION

## 2019-10-11 RX ORDER — DOXEPIN HYDROCHLORIDE 25 MG/1
25 CAPSULE ORAL NIGHTLY
Qty: 30 CAPSULE | Refills: 0 | Status: SHIPPED | OUTPATIENT
Start: 2019-10-11 | End: 2019-10-25

## 2019-10-11 NOTE — PROGRESS NOTES
Workers Comp: Patient presents in office today with reported pain in upper thoracic region, anterior neck, and new onset of increased right sided shoulder/trapz pain in the past 5 days. Patient states pain starts from bottom of spine to neck.  Patient state

## 2019-10-11 NOTE — TELEPHONE ENCOUNTER
Mayda calling back with number to call for patient's VANDA report. States office can call [phone] to reach [facility] to discuss above. Per below message left for [facility] today regarding VANDA report.

## 2019-10-11 NOTE — PROGRESS NOTES
HPI:    Patient ID: Chandrakant Brown is a 48year old male. 61-year-old male presents for follow-up.   Since last time saw him he underwent an VANDA, and per the VANDA was released to return to work on light duty restrictions despite the fact that I written a Diclofenac Sodium 75 MG Oral Tab EC Take 1 tablet (75 mg total) by mouth 2 (two) times daily. Disp: 60 tablet Rfl: 0     Allergies:No Known Allergies   PHYSICAL EXAM:   Physical Exam   Constitutional: He appears well-developed and well-nourished.    HENT: him continue to work on light duty restrictions part-time while continuing physical therapy. 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 Note given to this effect. Follow up in 2 weeks.     Abimbola Olivas DO  Physical Medicine and Rehabilitation  [place]

## 2019-10-11 NOTE — TELEPHONE ENCOUNTER
Africa Raya from pt's employer calling to speak w/RN, would not provide further details, asking for call back

## 2019-10-11 NOTE — TELEPHONE ENCOUNTER
Called employer's HR department at [phone], and requested patient's VANDA report.       Spoke with Sri Wyatt who is checking on how to access the report since Lionel Arroyo is out of the office today.       Contact information for this office given to Sri Wyatt.

## 2019-10-11 NOTE — TELEPHONE ENCOUNTER
Spoke with Mayda regarding patient request. She  Stated she will call back with further information. She stated patient will need LLUVIA signed and faxed, provided me a Fax number - [phone].      Reviewed workman's comp details and Left a message for [facility]

## 2019-10-14 ENCOUNTER — TELEPHONE (OUTPATIENT)
Dept: SURGERY | Facility: CLINIC | Age: 54
End: 2019-10-14

## 2019-10-14 ENCOUNTER — OFFICE VISIT (OUTPATIENT)
Dept: SURGERY | Facility: CLINIC | Age: 54
End: 2019-10-14
Payer: OTHER MISCELLANEOUS

## 2019-10-14 VITALS
HEART RATE: 74 BPM | HEIGHT: 66 IN | SYSTOLIC BLOOD PRESSURE: 118 MMHG | WEIGHT: 171 LBS | RESPIRATION RATE: 16 BRPM | BODY MASS INDEX: 27.48 KG/M2 | DIASTOLIC BLOOD PRESSURE: 78 MMHG

## 2019-10-14 DIAGNOSIS — Z98.890 S/P CERVICAL DISCECTOMY: Primary | ICD-10-CM

## 2019-10-14 PROCEDURE — 99213 OFFICE O/P EST LOW 20 MIN: CPT | Performed by: NEUROLOGICAL SURGERY

## 2019-10-14 RX ORDER — DICLOFENAC SODIUM 75 MG/1
75 TABLET, DELAYED RELEASE ORAL NIGHTLY
COMMUNITY
End: 2019-12-06

## 2019-10-14 NOTE — TELEPHONE ENCOUNTER
disability DIANE milan needs date/time of next appt, OV note, current work status, new requests for treatment to be faxed to [phone].  Endorsed to [place]

## 2019-10-14 NOTE — PROGRESS NOTES
[facility]  Neurosurgery Clinic Visit      HISTORY OF PRESENT ILLNESS:  Buck Marx is a(n) 48year old male s/p 3/14/19 C6-7 ADR. He continues to note neck pain that radiates to his upper chest and posterior base of his neck.   He negatives are noted in HPI.       PHYSICAL EXAMINATION:  VITAL SIGNS: /78 (BP Location: Left arm, Patient Position: Sitting, Cuff Size: adult)   Pulse 74   Resp 16   Ht 66\"   Wt 171 lb (77.6 kg)   BMI 27.60 kg/m²   GENERAL:  Patient is a 48year old

## 2019-10-16 ENCOUNTER — TELEPHONE (OUTPATIENT)
Dept: SURGERY | Facility: CLINIC | Age: 54
End: 2019-10-16

## 2019-10-16 ENCOUNTER — MED REC SCAN ONLY (OUTPATIENT)
Dept: PAIN CLINIC | Facility: CLINIC | Age: 54
End: 2019-10-16

## 2019-10-16 DIAGNOSIS — M54.12 CERVICAL RADICULOPATHY: ICD-10-CM

## 2019-10-16 RX ORDER — DOXEPIN HYDROCHLORIDE 25 MG/1
CAPSULE ORAL
Qty: 30 CAPSULE | Refills: 0 | OUTPATIENT
Start: 2019-10-16

## 2019-10-17 ENCOUNTER — TELEPHONE (OUTPATIENT)
Dept: SURGERY | Facility: CLINIC | Age: 54
End: 2019-10-17

## 2019-10-18 ENCOUNTER — MED REC SCAN ONLY (OUTPATIENT)
Dept: PAIN CLINIC | Facility: CLINIC | Age: 54
End: 2019-10-18

## 2019-10-21 ENCOUNTER — TELEPHONE (OUTPATIENT)
Dept: SURGERY | Facility: CLINIC | Age: 54
End: 2019-10-21

## 2019-10-23 ENCOUNTER — TELEPHONE (OUTPATIENT)
Dept: SURGERY | Facility: CLINIC | Age: 54
End: 2019-10-23

## 2019-10-25 ENCOUNTER — OFFICE VISIT (OUTPATIENT)
Dept: PHYSICAL MEDICINE AND REHAB | Facility: CLINIC | Age: 54
End: 2019-10-25
Payer: COMMERCIAL

## 2019-10-25 VITALS
DIASTOLIC BLOOD PRESSURE: 84 MMHG | HEART RATE: 79 BPM | BODY MASS INDEX: 27.48 KG/M2 | HEIGHT: 66 IN | SYSTOLIC BLOOD PRESSURE: 122 MMHG | OXYGEN SATURATION: 95 % | WEIGHT: 171 LBS

## 2019-10-25 DIAGNOSIS — M54.12 CERVICAL RADICULOPATHY: Primary | ICD-10-CM

## 2019-10-25 DIAGNOSIS — M79.18 MYOFASCIAL PAIN: ICD-10-CM

## 2019-10-25 PROCEDURE — 99214 OFFICE O/P EST MOD 30 MIN: CPT | Performed by: PHYSICAL MEDICINE & REHABILITATION

## 2019-10-25 RX ORDER — DOXEPIN HYDROCHLORIDE 50 MG/1
50 CAPSULE ORAL NIGHTLY
Qty: 30 CAPSULE | Refills: 0 | Status: SHIPPED | OUTPATIENT
Start: 2019-10-25 | End: 2019-12-06

## 2019-10-25 NOTE — PROGRESS NOTES
Spoke with patient and scheduled injection(s). Reviewed pre-op instructions. Patient verbalized understanding, and agreeable to holding medications .   Encouraged pt to contact office if changes in health status occur (including recent antibiotic use, new w

## 2019-10-25 NOTE — PATIENT INSTRUCTIONS
Refill policies:    • Allow 2-3 business days for refills; controlled substances may take longer.   • Contact your pharmacy at least 5 days prior to running out of medication and have them send an electronic request or submit request through the “request re Depending on your insurance carrier, approval may take 3-10 days. It is highly recommended patients contact their insurance carrier directly to determine coverage.   If test is done without insurance authorization, patient may be responsible for the entire 11/8/19  Appointment Time: 8:30 AM  Physician: Dr Haley Campbell     ** TO AVOID CANCELLATION AND/OR RESCHEDULING: PLEASE CALL LAURIE PRE-PROCEDURE LINE -[phone] FOR DETAILED INSTRUCTIONS FIVE TO SEVEN DAYS PRIOR TO PROCEDURE**      Location: [facility] financial  responsibility will be for the procedure(s). Cancellation/Rescheduling Appointment:   In the event you need to cancel or reschedule your appointment, you must notify the office 24 hours prior.     Post-procedure instructions:        Please joe

## 2019-10-25 NOTE — PROGRESS NOTES
Patient presents in office today with reported pain in front and back of neck, when bending or lifting, patient notes pain in right shoulder and arm . Patient reports that since he has started working, his body is \"very sore\" especially in the morning.  C

## 2019-10-25 NOTE — PROGRESS NOTES
HPI:    Patient ID: Rosy Resendez is a 48year old male. 17-year-old male presents for follow-up. She continues to experience intermittent stabbing, sharp, aching pain along the clavicles, upper trapezius rating to the head with associated headaches. paraspinals bilaterally. Tenderness palpation at the origin of the sternocleidomastoid musculature bilaterally    Provocative maneuvers: Spurling test to the right positive for neck pain only. Spurling test to the left positive for neck pain only.      Daniel

## 2019-10-28 ENCOUNTER — TELEPHONE (OUTPATIENT)
Dept: SURGERY | Facility: CLINIC | Age: 54
End: 2019-10-28

## 2019-10-28 NOTE — TELEPHONE ENCOUNTER
MR request subpoena for all MR and all billing statements at [facility]. $25 check #[id number] and sent to [employer] with MR request. Copy sent to scanning. Copy sent to Billing.

## 2019-10-29 ENCOUNTER — TELEPHONE (OUTPATIENT)
Dept: SURGERY | Facility: CLINIC | Age: 54
End: 2019-10-29

## 2019-10-31 ENCOUNTER — TELEPHONE (OUTPATIENT)
Dept: SURGERY | Facility: CLINIC | Age: 54
End: 2019-10-31

## 2019-10-31 NOTE — TELEPHONE ENCOUNTER
I called workmen's comp  Natalie Gibbons [phone] - he is on vacation, spoke to Lawrence. They are working his case in absence - case# [id number].  I faxed You Rosenthal notes from 10-25-19 and requested auth for TPI codes  & [phone] - tracking

## 2019-11-04 ENCOUNTER — MED REC SCAN ONLY (OUTPATIENT)
Dept: PAIN CLINIC | Facility: CLINIC | Age: 54
End: 2019-11-04

## 2019-11-06 NOTE — TELEPHONE ENCOUNTER
I called back and spoke to Poulan and requested she send denial in writing.  Since the  Javier Baumann) returns tomorrow she stated she would leave him a message to fax us the denial.

## 2019-11-06 NOTE — TELEPHONE ENCOUNTER
Patient notified WC will not cover scheduled injection, patient requesting for authorization to be initiated with [employer]. Patient has no further needs.

## 2019-11-06 NOTE — TELEPHONE ENCOUNTER
I called [employer] regarding auth for TPI and spoke to Cinda Minaya. Carries stated an VANDA was done on 09- and deemed no futther treatment was required.  TPI are NOT approved at this time based on the VANDA

## 2019-11-07 NOTE — TELEPHONE ENCOUNTER
Spoke to TRISTAN at [facility], provided information for upcoming office injection. Procedure codes 28781 & 90767 are valid and billable, no prior authorization or predetermination required at this time.  Call reference # [id number]  Duration of call: 10:12

## 2019-11-08 ENCOUNTER — OFFICE VISIT (OUTPATIENT)
Dept: PHYSICAL MEDICINE AND REHAB | Facility: CLINIC | Age: 54
End: 2019-11-08
Payer: COMMERCIAL

## 2019-11-08 VITALS
DIASTOLIC BLOOD PRESSURE: 80 MMHG | BODY MASS INDEX: 27.48 KG/M2 | OXYGEN SATURATION: 96 % | WEIGHT: 171 LBS | HEART RATE: 84 BPM | HEIGHT: 66 IN | SYSTOLIC BLOOD PRESSURE: 120 MMHG

## 2019-11-08 DIAGNOSIS — M54.12 CERVICAL RADICULOPATHY: Primary | ICD-10-CM

## 2019-11-08 DIAGNOSIS — M79.18 CERVICAL MYOFASCIAL PAIN SYNDROME: ICD-10-CM

## 2019-11-08 PROCEDURE — 20553 NJX 1/MLT TRIGGER POINTS 3/>: CPT | Performed by: PHYSICAL MEDICINE & REHABILITATION

## 2019-11-08 RX ORDER — LIDOCAINE HYDROCHLORIDE 10 MG/ML
4.5 INJECTION, SOLUTION INFILTRATION; PERINEURAL ONCE
Status: COMPLETED | OUTPATIENT
Start: 2019-11-08 | End: 2019-11-08

## 2019-11-08 RX ORDER — METHYLPREDNISOLONE ACETATE 40 MG/ML
20 INJECTION, SUSPENSION INTRA-ARTICULAR; INTRALESIONAL; INTRAMUSCULAR; SOFT TISSUE ONCE
Status: COMPLETED | OUTPATIENT
Start: 2019-11-08 | End: 2019-11-08

## 2019-11-08 NOTE — PROGRESS NOTES
Patient presents in office today with reported pain in base of neck, right shoulder     Current pain level reported = 7/10

## 2019-11-08 NOTE — PROGRESS NOTES
Timeout completed prior to procedure @ 9:00. Participants present for timeout:  Dr. Marlis Galeazzi , and patient.

## 2019-11-13 ENCOUNTER — OFFICE VISIT (OUTPATIENT)
Dept: FAMILY MEDICINE CLINIC | Facility: CLINIC | Age: 54
End: 2019-11-13
Payer: COMMERCIAL

## 2019-11-13 VITALS
BODY MASS INDEX: 29 KG/M2 | WEIGHT: 180.19 LBS | SYSTOLIC BLOOD PRESSURE: 110 MMHG | DIASTOLIC BLOOD PRESSURE: 80 MMHG | RESPIRATION RATE: 14 BRPM | HEART RATE: 80 BPM | TEMPERATURE: 97 F

## 2019-11-13 DIAGNOSIS — M54.12 CERVICAL RADICULOPATHY: Primary | ICD-10-CM

## 2019-11-13 DIAGNOSIS — G89.29 CHRONIC UPPER BACK PAIN: ICD-10-CM

## 2019-11-13 DIAGNOSIS — G89.29 CHRONIC NECK PAIN: ICD-10-CM

## 2019-11-13 DIAGNOSIS — Z98.890 S/P CERVICAL DISC REPLACEMENT: ICD-10-CM

## 2019-11-13 DIAGNOSIS — M54.2 CHRONIC NECK PAIN: ICD-10-CM

## 2019-11-13 DIAGNOSIS — M54.9 CHRONIC UPPER BACK PAIN: ICD-10-CM

## 2019-11-13 PROCEDURE — 99213 OFFICE O/P EST LOW 20 MIN: CPT | Performed by: FAMILY MEDICINE

## 2019-11-13 NOTE — PROGRESS NOTES
Gwen Ingram is a 48year old male. HPI:   Patient here to f/u on his neck.     His still has a workmans comp case, patient reports was evaluated by them and told he's probably ready to do light duty at work no more than 20#, but patient reports his spe • CHOLECYSTECTOMY     • OTHER  03/14/2019    anterior cervical six-seven discectomy, arthroplasty   • OTHER SURGICAL HISTORY      \"ear surgery for hearing\" bilateral      Family History   Problem Relation Age of Onset   • Heart Disorder Father    • Hea and/or aerobics;  I suggest he look into yoga and/or pilates; I suggest he ocntinue to follow-up with his specialists and encouraged him to ask them the same questions he is asking me as this is more their area of expertise    The patient indicates Jeni

## 2019-11-18 ENCOUNTER — TELEPHONE (OUTPATIENT)
Dept: SURGERY | Facility: CLINIC | Age: 54
End: 2019-11-18

## 2019-11-22 ENCOUNTER — TELEPHONE (OUTPATIENT)
Dept: SURGERY | Facility: CLINIC | Age: 54
End: 2019-11-22

## 2019-12-06 ENCOUNTER — OFFICE VISIT (OUTPATIENT)
Dept: PHYSICAL MEDICINE AND REHAB | Facility: CLINIC | Age: 54
End: 2019-12-06
Payer: COMMERCIAL

## 2019-12-06 VITALS
SYSTOLIC BLOOD PRESSURE: 110 MMHG | HEART RATE: 85 BPM | OXYGEN SATURATION: 93 % | DIASTOLIC BLOOD PRESSURE: 80 MMHG | HEIGHT: 66 IN | BODY MASS INDEX: 28.61 KG/M2 | WEIGHT: 178 LBS

## 2019-12-06 DIAGNOSIS — M54.12 CERVICAL RADICULOPATHY: ICD-10-CM

## 2019-12-06 PROCEDURE — 99214 OFFICE O/P EST MOD 30 MIN: CPT | Performed by: PHYSICAL MEDICINE & REHABILITATION

## 2019-12-06 RX ORDER — TIZANIDINE 2 MG/1
2 TABLET ORAL 2 TIMES DAILY PRN
Qty: 60 TABLET | Refills: 0 | Status: SHIPPED | OUTPATIENT
Start: 2019-12-06 | End: 2020-08-28

## 2019-12-06 RX ORDER — DOXEPIN HYDROCHLORIDE 50 MG/1
50 CAPSULE ORAL NIGHTLY
Qty: 30 CAPSULE | Refills: 0 | Status: SHIPPED | OUTPATIENT
Start: 2019-12-06 | End: 2020-08-28

## 2019-12-06 RX ORDER — DICLOFENAC SODIUM 75 MG/1
75 TABLET, DELAYED RELEASE ORAL DAILY PRN
Qty: 30 TABLET | Refills: 0 | Status: SHIPPED | OUTPATIENT
Start: 2019-12-06 | End: 2020-08-28

## 2019-12-06 NOTE — PROGRESS NOTES
Patient presents in office today with reported pain in neck, right arm stopping at the forearm     Current pain level reported = 7/10    Trigger Point Injections 11/8/19     Relief report: 50% for 2 weeks

## 2019-12-09 NOTE — PROGRESS NOTES
HPI:    Patient ID: Benson Crisostomo is a 48year old male. 48year old male presents for follow up. After trigger point injections reported 50% relief of pain for 2 weeks, and then the symptoms returned to baseline.   He continues to experience bilateral range of motion: 40 degrees with cervical extension. 40 degrees with cervical flexion. 75 degrees with right cervical rotation. 75 degrees with left cervical rotation.     Palpation: ttp right upper trapezius and bilateral cervical paraspinals    Provocativ

## 2019-12-12 ENCOUNTER — MED REC SCAN ONLY (OUTPATIENT)
Dept: NEUROLOGY | Facility: CLINIC | Age: 54
End: 2019-12-12

## 2019-12-17 ENCOUNTER — TELEPHONE (OUTPATIENT)
Dept: NEUROLOGY | Facility: CLINIC | Age: 54
End: 2019-12-17

## 2019-12-17 NOTE — TELEPHONE ENCOUNTER
pt is concerned that his EMG is scheduled for 1/15/20 and per  his work comp he is to have a VANDA on 1/6/19, pt is concerned that they will make him go back to work without completing his testing, pt would like some direction, should pt have the VANDA prior t

## 2020-01-13 ENCOUNTER — TELEPHONE (OUTPATIENT)
Dept: FAMILY MEDICINE CLINIC | Facility: CLINIC | Age: 55
End: 2020-01-13

## 2020-01-13 NOTE — TELEPHONE ENCOUNTER
Called the pt via the language line to see why the request and he states that he has an enlarged testicle and it continues to get bigger- he wonders if he needs to see a specialist or Dr. Robert Syed    I asked how long this has been going on and he tells me for

## 2020-01-15 ENCOUNTER — TELEPHONE (OUTPATIENT)
Dept: SURGERY | Facility: CLINIC | Age: 55
End: 2020-01-15

## 2020-01-15 ENCOUNTER — OFFICE VISIT (OUTPATIENT)
Dept: ELECTROPHYSIOLOGY | Facility: HOSPITAL | Age: 55
End: 2020-01-15
Attending: PHYSICAL MEDICINE & REHABILITATION
Payer: COMMERCIAL

## 2020-01-15 DIAGNOSIS — M54.12 CERVICAL RADICULOPATHY: ICD-10-CM

## 2020-01-15 PROCEDURE — 95886 MUSC TEST DONE W/N TEST COMP: CPT | Performed by: OTHER

## 2020-01-15 PROCEDURE — 95908 NRV CNDJ TST 3-4 STUDIES: CPT | Performed by: OTHER

## 2020-01-15 NOTE — PROCEDURES
[facility], [address] [place] [address]      PATIENT'S NAME: Leanne Rodriguez   REFERRING PHYSICIAN: Gabby Lambert DO   PATIENT ACCOUNT #: [de-identified] LOCATION: [facility]   MEDICAL RECORD #: [id number] DATE OF BIRTH: [birth year]

## 2020-01-17 ENCOUNTER — OFFICE VISIT (OUTPATIENT)
Dept: FAMILY MEDICINE CLINIC | Facility: CLINIC | Age: 55
End: 2020-01-17
Payer: COMMERCIAL

## 2020-01-17 VITALS
BODY MASS INDEX: 28 KG/M2 | RESPIRATION RATE: 18 BRPM | SYSTOLIC BLOOD PRESSURE: 120 MMHG | HEART RATE: 90 BPM | WEIGHT: 173.38 LBS | DIASTOLIC BLOOD PRESSURE: 76 MMHG | TEMPERATURE: 97 F | OXYGEN SATURATION: 98 %

## 2020-01-17 DIAGNOSIS — N50.89 TESTICULAR LUMP: ICD-10-CM

## 2020-01-17 DIAGNOSIS — N50.89 TESTICULAR SWELLING, LEFT: Primary | ICD-10-CM

## 2020-01-17 PROCEDURE — 99214 OFFICE O/P EST MOD 30 MIN: CPT | Performed by: FAMILY MEDICINE

## 2020-01-17 NOTE — PROGRESS NOTES
Benson Crisostomo is a 48year old male. HPI:   Patient here for eval of L testicular lump/swelling.     About 3-4 years ago noticed a small ball in the L testicle, didn't think much of it, but seems obviously bigger now, not sure what time frame it's been g use: No         REVIEW OF SYSTEMS:   GENERAL HEALTH: feels well otherwise  SKIN: denies any unusual skin lesions or rashes  RESPIRATORY: denies shortness of breath with exertion  CARDIOVASCULAR: denies chest pain on exertion  GI: denies abdominal pain/n/v

## 2020-01-18 ENCOUNTER — HOSPITAL ENCOUNTER (OUTPATIENT)
Dept: ULTRASOUND IMAGING | Age: 55
Discharge: HOME | End: 2020-01-18
Attending: FAMILY MEDICINE
Payer: COMMERCIAL

## 2020-01-18 DIAGNOSIS — N50.89 TESTICULAR LUMP: ICD-10-CM

## 2020-01-18 DIAGNOSIS — N50.89 TESTICULAR SWELLING, LEFT: ICD-10-CM

## 2020-01-18 PROCEDURE — 93975 VASCULAR STUDY: CPT | Performed by: FAMILY MEDICINE

## 2020-01-18 PROCEDURE — 76870 US EXAM SCROTUM: CPT | Performed by: FAMILY MEDICINE

## 2020-01-20 ENCOUNTER — TELEPHONE (OUTPATIENT)
Dept: SURGERY | Facility: CLINIC | Age: 55
End: 2020-01-20

## 2020-01-22 ENCOUNTER — TELEPHONE (OUTPATIENT)
Dept: FAMILY MEDICINE CLINIC | Facility: CLINIC | Age: 55
End: 2020-01-22

## 2020-01-22 NOTE — TELEPHONE ENCOUNTER
Kyleigh the daughter of the pt called- she is on the [employer] Form- fo rthe test results    Gave her the results and the recommendations  She v/u  Pt wanted to know if he had to have surgery what is the recovery time- advised that we don't have that information

## 2020-01-22 NOTE — TELEPHONE ENCOUNTER
----- Message from Carlos A Alejandro MD sent at 2020  7:33 AM CST -----  plase notify patient with a  service (unless he has [address] speaking family on [facility]) that good news, no concerning testicular masses, certainly nothing that looks like can

## 2020-01-24 ENCOUNTER — TELEPHONE (OUTPATIENT)
Dept: PAIN CLINIC | Facility: CLINIC | Age: 55
End: 2020-01-24

## 2020-01-24 ENCOUNTER — OFFICE VISIT (OUTPATIENT)
Dept: PHYSICAL MEDICINE AND REHAB | Facility: CLINIC | Age: 55
End: 2020-01-24
Payer: OTHER MISCELLANEOUS

## 2020-01-24 VITALS
SYSTOLIC BLOOD PRESSURE: 119 MMHG | OXYGEN SATURATION: 92 % | HEART RATE: 85 BPM | BODY MASS INDEX: 27.48 KG/M2 | WEIGHT: 171 LBS | HEIGHT: 66 IN | DIASTOLIC BLOOD PRESSURE: 78 MMHG

## 2020-01-24 DIAGNOSIS — M54.12 CERVICAL RADICULOPATHY: Primary | ICD-10-CM

## 2020-01-24 PROCEDURE — 99215 OFFICE O/P EST HI 40 MIN: CPT | Performed by: PHYSICAL MEDICINE & REHABILITATION

## 2020-01-24 NOTE — TELEPHONE ENCOUNTER
Prior authorization request completed for: Stevie Mccray #[id number]     Authorization dates: 01/24/20 - 04/23/20  CPT codes approved: 62349  Number of visits/dates of service approved: 1  Physician: Jaden Sumner    Patient can be scheduled

## 2020-01-24 NOTE — PROGRESS NOTES
Patient presents in office today with reported pain in center of chest, between shoulder blades, the right shoulder radiating down to the mid forearm. Pt states his right arm occasionally has numbness and tingling.     Current pain level reported = 7-8/10

## 2020-01-24 NOTE — PROGRESS NOTES
HPI:    Patient ID: Chandrakant Brown is a 48year old male. 77-year-old male presents for follow-up.   He continues to have bilateral neck pain, equally painful on both sides, that radiates down the lateral arm to the elbow with associated numbness and ti cervical rotation. 75 degrees with left cervical rotation. Palpation: mild ttp right cervical paraspinals and UT. Provocative maneuvers: Spurling test to the right + for neck pain.      Neurological:   Strength testing: slightly gives way with strengt

## 2020-01-24 NOTE — PROGRESS NOTES
Spoke with patient and patients son regarding scheduling process. Reviewed pre-op instructions. Patient verbalized understanding, and agreeable to holding Diclofenac Sodium for 48 hours (per Dr. Taurus Stafford).   Encouraged pt to contact office if changes in heal ? Please park in the [employer] parking garage and follow the signs to the [employer]. ? Please bring your Insurance Card, Photo ID, List of Current Medications and Referral (if applicable) to your appointment. Check in at [facility] ([address]  Marcus Saver Most insurances are now requiring a preauthorization for all procedures. In the event that your insurance does not authorize your procedure within 48 hours of the scheduled date, your procedure will be cancelled and rescheduled to a later date.    Please

## 2020-01-24 NOTE — TELEPHONE ENCOUNTER
Medical clearance needed- No    Implanted cardiac device/SCS/PNS: NA    Pt seen in OV today by Dr. Dileep Haley and recommended for PAOLA (X 1). Please begin PA process for procedure(s).      Laterality: NA  Level(s): C7-T1    Pt informed callback will be given

## 2020-01-28 NOTE — TELEPHONE ENCOUNTER
Patient requested to schedule injection with conscious sedation. Will confirm with Dr Alejandra Diallo if ok to schedule with sedation .

## 2020-01-28 NOTE — TELEPHONE ENCOUNTER
Patient was informed of below. Pre procedure instructions provided for sedation. Patient verbalized understanding. No further questions. Case placed on OR schedule.

## 2020-01-28 NOTE — TELEPHONE ENCOUNTER
[address]  PRE-PROCEDURE INSTRUCTIONS WITH IV SEDATION      Appointment Date: 2/7/20  Check-In Time: 8 AM     ** TO AVOID CANCELLATION AND/OR RESCHEDULING: PLEASE CALL [facility] PRE-PROCEDURE LINE -[phone] FOR DETAILED INSTRUCTIONS FIVE ? If you have an implanted Spinal Cord or Peripheral Nerve Stimulator: Please remember to turn device off for procedure      Medication:   Number of days you need to be off for the following medications:  ? Aggrenox 10 days   ?  Agrylin (Anagrelide) 10 days Post-procedure instructions:        Please schedule a follow up visit within 2 to 4 weeks after your last procedure date   Please call our office with any questions or concerns before or after your procedure at [phone].   If you are a diabetic, please

## 2020-01-29 ENCOUNTER — TELEPHONE (OUTPATIENT)
Dept: SURGERY | Facility: CLINIC | Age: 55
End: 2020-01-29

## 2020-02-04 ENCOUNTER — TELEPHONE (OUTPATIENT)
Dept: PAIN CLINIC | Facility: CLINIC | Age: 55
End: 2020-02-04

## 2020-02-04 NOTE — TELEPHONE ENCOUNTER
Left message for patient, confirmed procedure date of 2/7/20 and to be checked in at outpatient registration at 8:00 am. Patient instructed to call pre-procedure line before procedure at [phone].  Patient instructed to call office if there are addition

## 2020-02-07 ENCOUNTER — APPOINTMENT (OUTPATIENT)
Dept: GENERAL RADIOLOGY | Facility: HOSPITAL | Age: 55
End: 2020-02-07
Attending: PHYSICAL MEDICINE & REHABILITATION
Payer: COMMERCIAL

## 2020-02-07 ENCOUNTER — HOSPITAL ENCOUNTER (OUTPATIENT)
Facility: HOSPITAL | Age: 55
Setting detail: HOSPITAL OUTPATIENT SURGERY
Discharge: HOME | End: 2020-02-07
Attending: PHYSICAL MEDICINE & REHABILITATION | Admitting: PHYSICAL MEDICINE & REHABILITATION
Payer: COMMERCIAL

## 2020-02-07 VITALS
TEMPERATURE: 98 F | RESPIRATION RATE: 10 BRPM | HEART RATE: 69 BPM | DIASTOLIC BLOOD PRESSURE: 94 MMHG | OXYGEN SATURATION: 100 % | SYSTOLIC BLOOD PRESSURE: 131 MMHG

## 2020-02-07 DIAGNOSIS — M54.12 CERVICAL RADICULOPATHY: ICD-10-CM

## 2020-02-07 PROCEDURE — 62321 NJX INTERLAMINAR CRV/THRC: CPT | Performed by: PHYSICAL MEDICINE & REHABILITATION

## 2020-02-07 PROCEDURE — B01B1ZZ FLUOROSCOPY OF SPINAL CORD USING LOW OSMOLAR CONTRAST: ICD-10-PCS | Performed by: PHYSICAL MEDICINE & REHABILITATION

## 2020-02-07 PROCEDURE — 99152 MOD SED SAME PHYS/QHP 5/>YRS: CPT | Performed by: PHYSICAL MEDICINE & REHABILITATION

## 2020-02-07 PROCEDURE — 3E0R33Z INTRODUCTION OF ANTI-INFLAMMATORY INTO SPINAL CANAL, PERCUTANEOUS APPROACH: ICD-10-PCS | Performed by: PHYSICAL MEDICINE & REHABILITATION

## 2020-02-07 RX ORDER — DIPHENHYDRAMINE HYDROCHLORIDE 50 MG/ML
50 INJECTION INTRAMUSCULAR; INTRAVENOUS ONCE AS NEEDED
Status: CANCELLED | OUTPATIENT
Start: 2020-02-07 | End: 2020-02-07

## 2020-02-07 RX ORDER — 0.9 % SODIUM CHLORIDE 0.9 %
VIAL (ML) INJECTION AS NEEDED
Status: DISCONTINUED | OUTPATIENT
Start: 2020-02-07 | End: 2020-02-07

## 2020-02-07 RX ORDER — SODIUM CHLORIDE, SODIUM LACTATE, POTASSIUM CHLORIDE, CALCIUM CHLORIDE 600; 310; 30; 20 MG/100ML; MG/100ML; MG/100ML; MG/100ML
100 INJECTION, SOLUTION INTRAVENOUS CONTINUOUS
Status: DISCONTINUED | OUTPATIENT
Start: 2020-02-07 | End: 2020-02-07

## 2020-02-07 RX ORDER — LIDOCAINE HYDROCHLORIDE 10 MG/ML
INJECTION, SOLUTION EPIDURAL; INFILTRATION; INTRACAUDAL; PERINEURAL AS NEEDED
Status: DISCONTINUED | OUTPATIENT
Start: 2020-02-07 | End: 2020-02-07

## 2020-02-07 RX ORDER — DEXAMETHASONE SODIUM PHOSPHATE 10 MG/ML
INJECTION, SOLUTION INTRAMUSCULAR; INTRAVENOUS AS NEEDED
Status: DISCONTINUED | OUTPATIENT
Start: 2020-02-07 | End: 2020-02-07

## 2020-02-07 RX ORDER — ONDANSETRON 2 MG/ML
4 INJECTION INTRAMUSCULAR; INTRAVENOUS ONCE AS NEEDED
Status: DISCONTINUED | OUTPATIENT
Start: 2020-02-07 | End: 2020-02-07

## 2020-02-07 RX ORDER — ONDANSETRON 2 MG/ML
4 INJECTION INTRAMUSCULAR; INTRAVENOUS ONCE AS NEEDED
Status: CANCELLED | OUTPATIENT
Start: 2020-02-07 | End: 2020-02-07

## 2020-02-07 RX ORDER — MIDAZOLAM HYDROCHLORIDE 1 MG/ML
INJECTION INTRAMUSCULAR; INTRAVENOUS AS NEEDED
Status: DISCONTINUED | OUTPATIENT
Start: 2020-02-07 | End: 2020-02-07

## 2020-02-07 NOTE — H&P
[address] [facility] Patient Status:  Hospital Outpatient Surgery    1965 MRN [id number]   [facility] ENDOSCOPY Attending Ambrocio Smith, [address] [facility] Day # 0 PCP Shobha Corcoran MD     Date of Admissio 2 (two) times daily as needed. , Disp: 60 tablet, Rfl: 0, 2/5/2020        Physical Exam:   General: Alert, orientated x3. Cooperative. No apparent distress.   Vital Signs:  Blood pressure 131/88, pulse 70, temperature 98.1 °F (36.7 °C), temperature source

## 2020-02-07 NOTE — OPERATIVE REPORT
Procedure Note - Cervical Epidural Steroid Injection        Informed Consent Obtained by:  Katlin Hicks DO    Procedure: CERVICAL EPIDURAL STEROID INJECTION UNDER FLUOROSCOPY WITH EXTRADURAL MYELOGRAM C7-T1    Pre-operative Diagnosis: Cervical Stenosis, R condition    Discharge Instructions:    Patient was given post-procedure discharge instructions. Patient was ambulating and discharged in stable condition in the care of family member/friend.   Patient may continue taking his/her medication as prescribed b

## 2020-02-07 NOTE — OR NURSING
Patient states he has been having middle upper chest pain on and off for \"a while\" states that he thinks it has been about a year. Pain is worse when moving in certain directions and radiates to both sides of his neck. Denies any other sx.  Dr. Charan Arechiga

## 2020-02-13 ENCOUNTER — TELEPHONE (OUTPATIENT)
Dept: PAIN CLINIC | Facility: CLINIC | Age: 55
End: 2020-02-13

## 2020-02-13 NOTE — TELEPHONE ENCOUNTER
DO [facility] Nurse; Adali Galindo, TOYIN             Please contact this patient for a status update after Friday's injection.         Spoke with patient to follow up on post injection status He stated he felt very good for 3-4 days after

## 2020-02-18 ENCOUNTER — TELEPHONE (OUTPATIENT)
Dept: FAMILY MEDICINE CLINIC | Facility: CLINIC | Age: 55
End: 2020-02-18

## 2020-02-18 NOTE — TELEPHONE ENCOUNTER
Fax from the law office of Marianela Vang  [phone]  [phone]    Requesting most recent note and work status    Sent the last office note from 11/2019    Advised that we are not managing work status- this comes from the specialist.

## 2020-02-19 ENCOUNTER — TELEPHONE (OUTPATIENT)
Dept: SURGERY | Facility: CLINIC | Age: 55
End: 2020-02-19

## 2020-02-19 NOTE — TELEPHONE ENCOUNTER
rcvd med rec req for most recent OV & work status for Dr Skyla Kowalski. Faxed over 10//14/19 OV & 5/6/19 note for work.  Sent req to scanning

## 2020-02-27 ENCOUNTER — MED REC SCAN ONLY (OUTPATIENT)
Dept: FAMILY MEDICINE CLINIC | Facility: CLINIC | Age: 55
End: 2020-02-27

## 2020-03-05 ENCOUNTER — MED REC SCAN ONLY (OUTPATIENT)
Dept: FAMILY MEDICINE CLINIC | Facility: CLINIC | Age: 55
End: 2020-03-05

## 2020-03-13 ENCOUNTER — OFFICE VISIT (OUTPATIENT)
Dept: PHYSICAL MEDICINE AND REHAB | Facility: CLINIC | Age: 55
End: 2020-03-13
Payer: COMMERCIAL

## 2020-03-13 VITALS
HEIGHT: 67 IN | HEART RATE: 82 BPM | OXYGEN SATURATION: 95 % | SYSTOLIC BLOOD PRESSURE: 130 MMHG | WEIGHT: 177 LBS | BODY MASS INDEX: 27.78 KG/M2 | DIASTOLIC BLOOD PRESSURE: 80 MMHG

## 2020-03-13 DIAGNOSIS — M54.12 CERVICAL RADICULOPATHY: Primary | ICD-10-CM

## 2020-03-13 DIAGNOSIS — R07.89 OTHER CHEST PAIN: ICD-10-CM

## 2020-03-13 PROCEDURE — 99214 OFFICE O/P EST MOD 30 MIN: CPT | Performed by: PHYSICAL MEDICINE & REHABILITATION

## 2020-03-13 NOTE — PROGRESS NOTES
Last procedure: CERVICAL 7 - THORACIC 1 INTERLAMINAR EPIDURAL STEROID INJECTION UNDER FLUOROSCOPY WITH CONSCIOUS SEDATION  Date: 02/07/20    Percentage of relief obtained: 70%  Duration of relief: 2 weeks      Patient presents in office today with reported

## 2020-03-13 NOTE — PATIENT INSTRUCTIONS
Recommend you see your primary care physician to evaluate your heart. If there is nothing wrong with your heart send me a message and we will consider further medication and possibly repeat the injection.     Refill policies:    • Allow 2-3 business days fo MONDAY-FRIDAY    Scheduling Tests: If your physician has ordered radiology tests such as MRI or CT scans, do not schedule the test until this office has notified you that the test has been approved by your insurer.   Depending on your insurance carrier,  for this paper. • Failure to follow above steps may result in the delay of form completion.

## 2020-04-20 NOTE — TELEPHONE ENCOUNTER
Cardiology Office Visit      Encounter Date:  04/20/2020    Patient ID:   Junior MONI Robles is a 86 y.o. male.    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 As you are well aware, this is a 86 y.o. male with history of chronic ischemic heart disease status post previous CABG and subsequent PCI stenting.  He has had  history of atrial fibrillation  and  is on long-term anticoagulation   therapy with warfarin.       Patient underwent AF ablation and  has been feeling much better without any significant symptoms-he does have easy bruisability and takes clopidogrel and warfarin.   Patient had prior AV roc reentrant tachycardia ablation.  He is intolerant to numerous medications including beta-blocker therapy.  He is currently on warfarin to prevent cardioembolic episodes. chads Vasc score--4.  He is status post permanent pacemaker implant       Interval History:  Patient denies any new complaints denies any symptoms of chest pain shortness of breath dizziness or syncope    Assessment & Plan    Impressions:  1- CAD,   Status post CABG.  Status post PCI stenting.  No anginal symptoms.      2- recurrent and  paroxysmal atrial fibrillation -- post ablation doing well very rare palpitations short-lived.    3- recurrent symptomatic SVT suggestive of AV roc reentrant tachycardia-- post ablation with complete resolution of symptoms     4- dual-chamber pacemaker in situ with normal function      5-  hypertension  Under excellent control      Recommendations:  Continue current medical therapy  Continued aggressive risk factor modification  Follow-up on blood pressure and heart rate at home  PT/INR check in  PT CALLED AND ADV THAT HE IS IN A LOT OF PAIN AND CAN NOT SLEEP AND WOULD LIKE TO BE SEEN IF POSSIBLE TODAY.      PT HAD MRI OF SPINE  YESTERDAY AND LOOKING FOR RESULTS      LOOKING FOR RECOMMENDATIONS    THANK YOU "the office once a month  we will see him in the office in 6 months for follow-up.  Thanks very much for allowing us to participate in the care of your patients  Objective:    Vitals:  Vitals:    04/20/20 0938   BP: 146/63   BP Location: Left arm   Pulse: 76   Resp: 18   SpO2: 100%   Weight: 67.1 kg (148 lb)   Height: 182.9 cm (72\")       Physical Exam:    General: Alert, cooperative, no distress, appears stated age  Head:  Normocephalic, atraumatic, mucous membranes moist  Eyes:  Conjunctiva/corneas clear, EOM's intact     Neck:  Supple,  no adenopathy;      Lungs: Clear to auscultation bilaterally, no wheezes rhonchi rales are noted  Chest wall: No tenderness/pacemaker site looks good with no infection or bleeding  Heart::  Regular rate and rhythm, S1 and S2 normal, no murmur, rub or gallop  Abdomen: Soft, non-tender, nondistended bowel sounds active  Extremities: No cyanosis, clubbing, or edema  Pulses: 2+ and symmetric all extremities  Skin:  No rashes or lesions  Neuro/psych: A&O x3. CN II through XII are grossly intact with appropriate affect      Allergies:  Allergies   Allergen Reactions   • Valsartan Other (See Comments)     Weakness, loss of appetite    • Atorvastatin Calcium Myalgia   • Codeine Other (See Comments)     Lightheaded        Medication Review:     Current Outpatient Medications:   •  Cholecalciferol (VITAMIN D-1000 MAX ST) 25 MCG (1000 UT) tablet, Take 1,000 Units by mouth Daily., Disp: , Rfl:   •  clopidogrel (PLAVIX) 75 MG tablet, TAKE 1 TABLET EVERY DAY, Disp: 90 tablet, Rfl: 3  •  nitroglycerin (NITROSTAT) 0.4 MG SL tablet, NITROSTAT 0.4 MG SUBL, Disp: , Rfl:   •  pantoprazole (PROTONIX) 40 MG EC tablet, Daily., Disp: , Rfl:   •  rosuvastatin (CRESTOR) 20 MG tablet, Take one half tablet daily, Disp: , Rfl:   •  vitamin C (ASCORBIC ACID) 500 MG tablet, VITAMIN C 500 MG TABS, Disp: , Rfl:   •  warfarin (COUMADIN) 5 MG tablet, Take one tablet daily or as directed., Disp: 90 tablet, Rfl: " 1    Family History:  Family History   Problem Relation Age of Onset   • Stomach cancer Mother    • Heart disease Father         leaky valve       Past Medical History:  Past Medical History:   Diagnosis Date   • Atrial fibrillation (CMS/[facility]) 12/04/2015    new onset   • AVNRT (AV roc re-entry tachycardia) (CMS/[facility]) 01/2018    AVNRT Ablation   • Dubose esophagus    • CAD (coronary artery disease)     S/P CABG and PCI   • Diverticulitis    • Gastroesophageal reflux disease    • Hyperlipidemia    • Hypertension    • Myocardial infarction (CMS/[facility])     anterior   • Paroxysmal atrial fibrillation (CMS/[facility])    • Pneumonia    • SSS (sick sinus syndrome) (CMS/[facility])     SSS, Toby, tachy; pacemaker implantation/ [employer]       Past surgical History:  Past Surgical History:   Procedure Laterality Date   • CARDIAC CATHETERIZATION      2001:2003; 2007:2010:[facility] 12-15-15/ unsuccessful attempt PCI of obtuse marginal branch of circumflex. represents failed 2nd attempt of PCI of this vessel.   • COLON SURGERY      9 inches on colon removed   • COLONOSCOPY     • CORONARY ANGIOPLASTY     • CORONARY ARTERY BYPASS GRAFT  08/15/2001    x4   • CORONARY STENT PLACEMENT  02/25/2007    LCX    • HERNIA REPAIR     • HERNIA REPAIR Left    • INSERT / REPLACE / REMOVE PACEMAKER  08/05/2016    [facility] [employer] pacemaker implantation   • OTHER SURGICAL HISTORY  02/25/2007    PCI/stent, LCX   • PROSTATE SURGERY  03/31/2019    REZUM: prostate   • ROTATOR CUFF REPAIR Left    • TOTAL HIP ARTHROPLASTY Left 04/04/2018    [facility]   • VENTRICULAR ABLATION SURGERY  01/2018    SVT ablation [facility]       Social History:  Social History     Socioeconomic History   • Marital status:      Spouse name: Not on file   • Number of children: Not on file   • Years of education: Not on file   • Highest education level: Not on file   Tobacco Use   • Smoking status: Current Every Day Smoker     Types: Pipe   • Smokeless tobacco: Never Used    Substance and Sexual Activity   • Alcohol use: No     Frequency: Never   • Drug use: No       Review of Systems:  The following systems were reviewed as they relate to the cardiovascular system: Constitutional, Eyes, ENT, Cardiovascular, Respiratory, Gastrointestinal, Integumentary, Neurological, Psychiatric, Hematologic, Endocrine, Musculoskeletal, and Genitourinary. The pertinent cardiovascular findings are reported above with all other cardiovascular points within those systems being negative.    Diagnostic Study Review:     Current Electrocardiogram:  Procedures      NOTE: The following portions of the patient's history were reviewed and updated this visit as appropriate: allergies, current medications, past family history, past medical history, past social history, past surgical history and problem list.

## 2020-06-02 ENCOUNTER — OFFICE VISIT (OUTPATIENT)
Dept: FAMILY MEDICINE CLINIC | Facility: CLINIC | Age: 55
End: 2020-06-02
Payer: COMMERCIAL

## 2020-06-02 VITALS
HEIGHT: 66 IN | DIASTOLIC BLOOD PRESSURE: 82 MMHG | HEART RATE: 82 BPM | OXYGEN SATURATION: 98 % | RESPIRATION RATE: 14 BRPM | TEMPERATURE: 99 F | WEIGHT: 174.19 LBS | BODY MASS INDEX: 27.99 KG/M2 | SYSTOLIC BLOOD PRESSURE: 120 MMHG

## 2020-06-02 DIAGNOSIS — Z82.49 FAMILY HISTORY OF EARLY CAD: ICD-10-CM

## 2020-06-02 DIAGNOSIS — E55.9 VITAMIN D DEFICIENCY: ICD-10-CM

## 2020-06-02 DIAGNOSIS — Z00.00 ROUTINE HISTORY AND PHYSICAL EXAMINATION OF ADULT: Primary | ICD-10-CM

## 2020-06-02 DIAGNOSIS — N44.2 TESTICULAR CYST: ICD-10-CM

## 2020-06-02 DIAGNOSIS — Z13.1 DIABETES MELLITUS SCREENING: ICD-10-CM

## 2020-06-02 DIAGNOSIS — Z13.220 LIPID SCREENING: ICD-10-CM

## 2020-06-02 DIAGNOSIS — Z12.5 PROSTATE CANCER SCREENING: ICD-10-CM

## 2020-06-02 DIAGNOSIS — Z23 NEED FOR VACCINATION: ICD-10-CM

## 2020-06-02 DIAGNOSIS — Z13.0 SCREENING, ANEMIA, DEFICIENCY, IRON: ICD-10-CM

## 2020-06-02 DIAGNOSIS — Z13.29 THYROID DISORDER SCREEN: ICD-10-CM

## 2020-06-02 DIAGNOSIS — R07.89 OTHER CHEST PAIN: ICD-10-CM

## 2020-06-02 PROCEDURE — 90750 HZV VACC RECOMBINANT IM: CPT | Performed by: FAMILY MEDICINE

## 2020-06-02 PROCEDURE — 99396 PREV VISIT EST AGE 40-64: CPT | Performed by: FAMILY MEDICINE

## 2020-06-02 PROCEDURE — 90472 IMMUNIZATION ADMIN EACH ADD: CPT | Performed by: FAMILY MEDICINE

## 2020-06-02 PROCEDURE — 93000 ELECTROCARDIOGRAM COMPLETE: CPT | Performed by: FAMILY MEDICINE

## 2020-06-02 PROCEDURE — 90471 IMMUNIZATION ADMIN: CPT | Performed by: FAMILY MEDICINE

## 2020-06-02 PROCEDURE — 90715 TDAP VACCINE 7 YRS/> IM: CPT | Performed by: FAMILY MEDICINE

## 2020-06-02 NOTE — PROGRESS NOTES
Arnaud Carson is a 48year old male who presents for a complete physical exam.   HPI:   Patient speaks to me in [address] I to him in [address]   He complains of nothing major, but notes 7 weeks ago he had a little pain in his mid upper chest, right shoulde CHOLEST 202 (H) 09/22/2017    CHOLEST 226 (H) 01/20/2016     Lab Results   Component Value Date    HDL 36 (L) 11/20/2018    HDL 40 (L) 09/22/2017    HDL 40 (L) 01/20/2016     Lab Results   Component Value Date    LDL  11/20/2018      Comment:      Unable t Heart Disorder Mother    • Heart Disorder Brother         CABG      Social History:  Social History    Tobacco Use      Smoking status: Never Smoker      Smokeless tobacco: Never Used    Alcohol use: Not Currently      Alcohol/week: 0.0 standard drinks tender,FROM of the back  EXTREMITIES: no cyanosis, clubbing or edema  NEURO: Oriented times three,cranial nerves are intact,motor and sensory are grossly intact; 2+ DTRs in patella bi    ASSESSMENT AND PLAN:   Jennifer Driscoll is a 48year old male who pres if doesn't treat it, can treat it just if really bothersome; he'll f/u with uro in sept      The patient indicates understanding of these issues and agrees to the plan.   The patient is asked to return for CPX in 1 year  Call in 1 week for results if hasn't

## 2020-06-03 ENCOUNTER — MED REC SCAN ONLY (OUTPATIENT)
Dept: FAMILY MEDICINE CLINIC | Facility: CLINIC | Age: 55
End: 2020-06-03

## 2020-06-16 ENCOUNTER — HOSPITAL ENCOUNTER (OUTPATIENT)
Dept: CT IMAGING | Facility: HOSPITAL | Age: 55
Discharge: HOME | End: 2020-06-16
Attending: FAMILY MEDICINE

## 2020-06-16 ENCOUNTER — APPOINTMENT (OUTPATIENT)
Dept: LAB | Facility: HOSPITAL | Age: 55
End: 2020-06-16
Attending: FAMILY MEDICINE

## 2020-06-16 DIAGNOSIS — Z00.00 ROUTINE HISTORY AND PHYSICAL EXAMINATION OF ADULT: ICD-10-CM

## 2020-06-16 DIAGNOSIS — Z82.49 FAMILY HISTORY OF EARLY CAD: ICD-10-CM

## 2020-06-16 DIAGNOSIS — Z13.6 SCREENING FOR CARDIOVASCULAR CONDITION: ICD-10-CM

## 2020-06-16 DIAGNOSIS — Z12.5 PROSTATE CANCER SCREENING: ICD-10-CM

## 2020-06-16 PROCEDURE — 84443 ASSAY THYROID STIM HORMONE: CPT | Performed by: FAMILY MEDICINE

## 2020-06-16 PROCEDURE — 82306 VITAMIN D 25 HYDROXY: CPT | Performed by: FAMILY MEDICINE

## 2020-06-16 PROCEDURE — 80061 LIPID PANEL: CPT | Performed by: FAMILY MEDICINE

## 2020-06-16 PROCEDURE — 80053 COMPREHEN METABOLIC PANEL: CPT | Performed by: FAMILY MEDICINE

## 2020-06-16 PROCEDURE — 83036 HEMOGLOBIN GLYCOSYLATED A1C: CPT | Performed by: FAMILY MEDICINE

## 2020-06-16 PROCEDURE — 85025 COMPLETE CBC W/AUTO DIFF WBC: CPT | Performed by: FAMILY MEDICINE

## 2020-06-16 PROCEDURE — 36415 COLL VENOUS BLD VENIPUNCTURE: CPT

## 2020-06-19 ENCOUNTER — TELEPHONE (OUTPATIENT)
Dept: FAMILY MEDICINE CLINIC | Facility: CLINIC | Age: 55
End: 2020-06-19

## 2020-06-19 DIAGNOSIS — E78.1 HYPERTRIGLYCERIDEMIA: Primary | ICD-10-CM

## 2020-06-19 NOTE — TELEPHONE ENCOUNTER
----- Message from Minda Leon MD sent at 6/19/2020  7:04 AM CDT -----  Please notify patient his labs look pretty good overall.   Blood sugar still in pre-diabetic range  But barely, almost normal, improved from last year  Thyroid, blood counts, prostate

## 2020-06-22 RX ORDER — ATORVASTATIN CALCIUM 10 MG/1
10 TABLET, FILM COATED ORAL NIGHTLY
Qty: 90 TABLET | Refills: 0 | Status: SHIPPED | OUTPATIENT
Start: 2020-06-22 | End: 2020-06-24

## 2020-06-22 NOTE — TELEPHONE ENCOUNTER
Derrek Marcelino notified of results listed below with the help of [employer] Andry [id number] with the [employer]. Agrees to Atorvastatin 10 mg. Labs ordered and faxed to [facility]. Patient aware to contact them to schedule.   Patient requested we send copies of blo

## 2020-06-24 ENCOUNTER — TELEPHONE (OUTPATIENT)
Dept: FAMILY MEDICINE CLINIC | Facility: CLINIC | Age: 55
End: 2020-06-24

## 2020-06-24 RX ORDER — ATORVASTATIN CALCIUM 10 MG/1
10 TABLET, FILM COATED ORAL NIGHTLY
Qty: 90 TABLET | Refills: 0 | Status: SHIPPED | OUTPATIENT
Start: 2020-06-24 | End: 2021-04-13

## 2020-06-24 NOTE — TELEPHONE ENCOUNTER
Atorvastatin sent into [facility]. Spoke with Saundra Saini from [facility]. Patient notified and verbalized understanding.

## 2020-06-24 NOTE — TELEPHONE ENCOUNTER
Patient had a script called into [facility] a few days ago but forgot his insurance only covers [facility]. Can we send it to the [facility] in [place] instead? Please call back.

## 2020-08-12 ENCOUNTER — TELEPHONE (OUTPATIENT)
Dept: FAMILY MEDICINE CLINIC | Facility: CLINIC | Age: 55
End: 2020-08-12

## 2020-08-13 NOTE — TELEPHONE ENCOUNTER
[facility]- can you place the order please  Future Appointments   Date Time Provider Agnes Rocha   8/14/2020  8:45 AM [facility] NURSE CONSTANZA Bishop   8/28/2020  9:00 AM DO ANGELINA Wayne

## 2020-08-14 ENCOUNTER — NURSE ONLY (OUTPATIENT)
Dept: FAMILY MEDICINE CLINIC | Facility: CLINIC | Age: 55
End: 2020-08-14
Payer: COMMERCIAL

## 2020-08-14 VITALS — TEMPERATURE: 99 F

## 2020-08-14 PROCEDURE — 90750 HZV VACC RECOMBINANT IM: CPT | Performed by: FAMILY MEDICINE

## 2020-08-14 PROCEDURE — 90471 IMMUNIZATION ADMIN: CPT | Performed by: FAMILY MEDICINE

## 2020-08-28 ENCOUNTER — OFFICE VISIT (OUTPATIENT)
Dept: PHYSICAL MEDICINE AND REHAB | Facility: CLINIC | Age: 55
End: 2020-08-28
Payer: COMMERCIAL

## 2020-08-28 VITALS — WEIGHT: 172 LBS | HEIGHT: 66 IN | BODY MASS INDEX: 27.64 KG/M2

## 2020-08-28 DIAGNOSIS — M75.41 SUBACROMIAL IMPINGEMENT OF RIGHT SHOULDER: ICD-10-CM

## 2020-08-28 DIAGNOSIS — M54.12 CERVICAL RADICULOPATHY: Primary | ICD-10-CM

## 2020-08-28 PROCEDURE — 3008F BODY MASS INDEX DOCD: CPT | Performed by: PHYSICAL MEDICINE & REHABILITATION

## 2020-08-28 PROCEDURE — 99215 OFFICE O/P EST HI 40 MIN: CPT | Performed by: PHYSICAL MEDICINE & REHABILITATION

## 2020-08-28 RX ORDER — DOXEPIN HYDROCHLORIDE 50 MG/1
50 CAPSULE ORAL NIGHTLY
Qty: 30 CAPSULE | Refills: 0 | Status: SHIPPED | OUTPATIENT
Start: 2020-08-28 | End: 2021-01-21

## 2020-08-28 RX ORDER — DICLOFENAC SODIUM 75 MG/1
75 TABLET, DELAYED RELEASE ORAL DAILY PRN
Qty: 30 TABLET | Refills: 0 | Status: SHIPPED | OUTPATIENT
Start: 2020-08-28 | End: 2021-01-21

## 2020-08-28 NOTE — PROGRESS NOTES
HPI:    Patient ID: Lory Boo is a 50year old male. This is a 32-year-old male presents for follow-up.   Since the last time I saw him he was managing the symptoms fairly well, until about 1 month ago when he began to have increased chest, right-s tablet 0   • atorvastatin 10 MG Oral Tab Take 1 tablet (10 mg total) by mouth nightly. 90 tablet 0     Allergies:  Penicillin G            OTHER (SEE COMMENTS)    Comment:Per pt, when he took penicillin before , his red             blood cells became low.

## 2020-08-28 NOTE — PROGRESS NOTES
Patient presents in office today with reported pain in anterior and posterior bottom of neck and right arm    Current pain level reported = 8/10

## 2020-08-31 ENCOUNTER — TELEPHONE (OUTPATIENT)
Dept: SURGERY | Facility: CLINIC | Age: 55
End: 2020-08-31

## 2020-08-31 ENCOUNTER — HOSPITAL ENCOUNTER (OUTPATIENT)
Dept: GENERAL RADIOLOGY | Facility: HOSPITAL | Age: 55
Discharge: HOME | End: 2020-08-31
Attending: PHYSICAL MEDICINE & REHABILITATION
Payer: COMMERCIAL

## 2020-08-31 DIAGNOSIS — M25.511 RIGHT SHOULDER PAIN, UNSPECIFIED CHRONICITY: ICD-10-CM

## 2020-08-31 PROCEDURE — 73030 X-RAY EXAM OF SHOULDER: CPT | Performed by: PHYSICAL MEDICINE & REHABILITATION

## 2020-08-31 NOTE — PROGRESS NOTES
Spoke with Pt and gave him the recommendations from the doctor.  Pt understood and will call and schedule MRI

## 2020-09-09 ENCOUNTER — TELEPHONE (OUTPATIENT)
Dept: SURGERY | Facility: CLINIC | Age: 55
End: 2020-09-09

## 2020-09-09 NOTE — TELEPHONE ENCOUNTER
MRI shoulder denial reason:    Patient needs 6 weeks of conservative care (within the last 3 months) and a follow up after the conservative care. Records do not show that patient has complete the conservative care and had a follow up.     Provider can call

## 2020-09-18 ENCOUNTER — OFFICE VISIT (OUTPATIENT)
Dept: PHYSICAL MEDICINE AND REHAB | Facility: CLINIC | Age: 55
End: 2020-09-18
Payer: COMMERCIAL

## 2020-09-18 VITALS
BODY MASS INDEX: 27.64 KG/M2 | HEART RATE: 86 BPM | SYSTOLIC BLOOD PRESSURE: 128 MMHG | WEIGHT: 172 LBS | HEIGHT: 66 IN | OXYGEN SATURATION: 95 % | DIASTOLIC BLOOD PRESSURE: 86 MMHG

## 2020-09-18 DIAGNOSIS — M75.41 SUBACROMIAL IMPINGEMENT OF RIGHT SHOULDER: ICD-10-CM

## 2020-09-18 DIAGNOSIS — M54.12 CERVICAL RADICULOPATHY: Primary | ICD-10-CM

## 2020-09-18 PROCEDURE — 99214 OFFICE O/P EST MOD 30 MIN: CPT | Performed by: PHYSICAL MEDICINE & REHABILITATION

## 2020-09-18 PROCEDURE — 3008F BODY MASS INDEX DOCD: CPT | Performed by: PHYSICAL MEDICINE & REHABILITATION

## 2020-09-18 PROCEDURE — 3074F SYST BP LT 130 MM HG: CPT | Performed by: PHYSICAL MEDICINE & REHABILITATION

## 2020-09-18 PROCEDURE — 3079F DIAST BP 80-89 MM HG: CPT | Performed by: PHYSICAL MEDICINE & REHABILITATION

## 2020-09-18 RX ORDER — ASPIRIN 81 MG/1
81 TABLET ORAL DAILY
COMMUNITY

## 2020-09-18 NOTE — PROGRESS NOTES
Patient presents in office today with reported pain in neck, right shoulder, right arm    Current pain level reported = 6/10

## 2020-09-18 NOTE — TELEPHONE ENCOUNTER
Patient scheduled to discuss MRI results in office today with Dr Harjinder Hodges  but MRI was denied . Appeal letter has been submitted .      Left a detailed message on patient's daughter Kyleigh's voicemail informing of MRI denial . Advised patient can wait to see D

## 2020-09-18 NOTE — PATIENT INSTRUCTIONS
Please make a follow up appointment after you have the Xrays of your neck and the MRI of your right shoulder.

## 2020-09-19 NOTE — PROGRESS NOTES
HPI:    Patient ID: Ha Citizen is a 50year old male. 50year old male presents for follow up. He continues to have right sided neck and shoulder pain that radiates to the lateral aspect of the arm.  Pain is typically in the right upper trapezius an Neurological:   5/5 UE bilaterally    2/4 biceps, triceps, BR bilaterally    Hurst test negative bilaterally   Nursing note and vitals reviewed.          ASSESSMENT/PLAN:   Cervical radiculopathy  (primary encounter diagnosis)  Subacromial impingement o

## 2020-10-26 ENCOUNTER — TELEPHONE (OUTPATIENT)
Dept: NEUROLOGY | Facility: CLINIC | Age: 55
End: 2020-10-26

## 2020-10-26 ENCOUNTER — HOSPITAL ENCOUNTER (OUTPATIENT)
Dept: GENERAL RADIOLOGY | Facility: HOSPITAL | Age: 55
Discharge: HOME | End: 2020-10-26
Attending: PHYSICAL MEDICINE & REHABILITATION
Payer: COMMERCIAL

## 2020-10-26 DIAGNOSIS — M54.12 CERVICAL RADICULOPATHY: ICD-10-CM

## 2020-10-26 PROCEDURE — 72052 X-RAY EXAM NECK SPINE 6/>VWS: CPT | Performed by: PHYSICAL MEDICINE & REHABILITATION

## 2020-10-26 NOTE — TELEPHONE ENCOUNTER
----- Message from [employer], DO sent at 10/26/2020 10:13 AM CDT -----  Please let the patient know the hardware is in place and there is no abnormal movement of the bones in the neck when he bends his head forwards and backwards.

## 2020-11-07 ENCOUNTER — WALK IN (OUTPATIENT)
Dept: URGENT CARE | Age: 55
End: 2020-11-07

## 2020-11-07 VITALS
TEMPERATURE: 98.7 F | OXYGEN SATURATION: 96 % | DIASTOLIC BLOOD PRESSURE: 82 MMHG | HEART RATE: 110 BPM | SYSTOLIC BLOOD PRESSURE: 148 MMHG | RESPIRATION RATE: 18 BRPM

## 2020-11-07 DIAGNOSIS — U07.1 COVID-19 VIRUS INFECTION: ICD-10-CM

## 2020-11-07 DIAGNOSIS — R50.9 FEVER, UNSPECIFIED: Primary | ICD-10-CM

## 2020-11-07 DIAGNOSIS — R05.9 COUGH: ICD-10-CM

## 2020-11-07 DIAGNOSIS — R51.9 NONINTRACTABLE HEADACHE, UNSPECIFIED CHRONICITY PATTERN, UNSPECIFIED HEADACHE TYPE: ICD-10-CM

## 2020-11-07 LAB — SARS-COV-2 AG RESP QL IA.RAPID: DETECTED

## 2020-11-07 PROCEDURE — 99203 OFFICE O/P NEW LOW 30 MIN: CPT | Performed by: FAMILY MEDICINE

## 2020-11-07 PROCEDURE — 87426 SARSCOV CORONAVIRUS AG IA: CPT | Performed by: FAMILY MEDICINE

## 2020-11-07 RX ORDER — HYDROCODONE BITARTRATE AND ACETAMINOPHEN 5; 325 MG/1; MG/1
1 TABLET ORAL EVERY 6 HOURS PRN
COMMUNITY

## 2020-11-10 ENCOUNTER — TELEPHONE (OUTPATIENT)
Dept: SCHEDULING | Age: 55
End: 2020-11-10

## 2020-11-14 ENCOUNTER — TELEPHONE (OUTPATIENT)
Dept: FAMILY MEDICINE CLINIC | Facility: CLINIC | Age: 55
End: 2020-11-14

## 2020-11-14 NOTE — TELEPHONE ENCOUNTER
Pt states there was 3 positive covid coworkers he had some exposure to. Pt had COVID testing done on 11/08/2020 and tested positive. He did not have symptoms until Sun/MOn. He is having fever, some cough.  He would like recommendations on medications for re

## 2020-11-14 NOTE — TELEPHONE ENCOUNTER
He can take ibuprofen 600-800 mg every 8 hrs (as long as he's not still on the diclofenac) as needed for pain and alternate with tylenol. If that is not helping then follow up with Dr. Hortencia Turner on Monday.  Or, if you are really feeling short of breath or in s

## 2020-11-14 NOTE — TELEPHONE ENCOUNTER
Pt called he has not worked in the last 7 days as someone @ his work was being tested for covid, Pt would like to speak with nurse about.

## 2020-12-07 NOTE — TELEPHONE ENCOUNTER
Spoke with Allison Mills at [address] [facility]  Time:09:07    Per Gary Koch was received and was Denied. Denial Reason remains the same. See Below     Based on [employer] Guidelines and the additional records submitted, the requested study remains denied.  The nehemiah

## 2020-12-15 ENCOUNTER — TELEPHONE (OUTPATIENT)
Dept: FAMILY MEDICINE CLINIC | Facility: CLINIC | Age: 55
End: 2020-12-15

## 2020-12-15 NOTE — TELEPHONE ENCOUNTER
Pt chking status of MRI approval. Made pt aware MRI was still denied, pt looking to completed MRI. Pls advise.

## 2020-12-16 NOTE — TELEPHONE ENCOUNTER
Patient has f/u appointment with Dr. Champ Basilio 01/15/2020 in [place] office. Left a detailed message to ask if he would like to come to [place] office for f/u visit we might be able to get him in sooner.

## 2020-12-30 ENCOUNTER — OFFICE VISIT (OUTPATIENT)
Dept: FAMILY MEDICINE CLINIC | Facility: CLINIC | Age: 55
End: 2020-12-30
Payer: COMMERCIAL

## 2020-12-30 VITALS
OXYGEN SATURATION: 98 % | BODY MASS INDEX: 27.64 KG/M2 | WEIGHT: 172 LBS | HEIGHT: 66 IN | TEMPERATURE: 98 F | HEART RATE: 94 BPM | SYSTOLIC BLOOD PRESSURE: 124 MMHG | RESPIRATION RATE: 16 BRPM | DIASTOLIC BLOOD PRESSURE: 84 MMHG

## 2020-12-30 DIAGNOSIS — Z86.16 HISTORY OF 2019 NOVEL CORONAVIRUS DISEASE (COVID-19): Primary | ICD-10-CM

## 2020-12-30 DIAGNOSIS — N44.2 TESTICULAR CYST: ICD-10-CM

## 2020-12-30 PROCEDURE — 3074F SYST BP LT 130 MM HG: CPT | Performed by: FAMILY MEDICINE

## 2020-12-30 PROCEDURE — 3008F BODY MASS INDEX DOCD: CPT | Performed by: FAMILY MEDICINE

## 2020-12-30 PROCEDURE — 99213 OFFICE O/P EST LOW 20 MIN: CPT | Performed by: FAMILY MEDICINE

## 2020-12-30 PROCEDURE — 3079F DIAST BP 80-89 MM HG: CPT | Performed by: FAMILY MEDICINE

## 2020-12-30 RX ORDER — AMOXICILLIN 500 MG/1
500 CAPSULE ORAL 3 TIMES DAILY
COMMUNITY
Start: 2020-12-19 | End: 2021-01-21 | Stop reason: ALTCHOICE

## 2020-12-30 RX ORDER — IBUPROFEN 800 MG/1
800 TABLET ORAL 4 TIMES DAILY PRN
COMMUNITY
Start: 2020-12-19 | End: 2021-06-25 | Stop reason: ALTCHOICE

## 2020-12-30 NOTE — PROGRESS NOTES
Ha Citizen is a 50year old male. HPI:   Patient had covid in October and feels like he's pretty much recovered.   He made this appointment 2 weeks ago and at that time had some chest pressure still and thought his memory wasn't as sharp but that seem arthroplasty   • OTHER SURGICAL HISTORY      \"ear surgery for hearing\" bilateral      Family History   Problem Relation Age of Onset   • Heart Disorder Father    • Heart Disorder Mother    • Heart Disorder Brother         CABG      Social History    [facility] future       The patient indicates understanding of these issues and agrees to the plan. The patient is asked to return annually for cpx.

## 2021-01-11 ENCOUNTER — TELEPHONE (OUTPATIENT)
Dept: NEUROLOGY | Facility: CLINIC | Age: 56
End: 2021-01-11

## 2021-01-11 NOTE — TELEPHONE ENCOUNTER
Called Timothy to initiate authorization for MRI right shoulder. Brien Matthews appealed denial . Was upheld. Pt. has f/u appt. on 01/21/21 at 9 am.   Cancelled MRI authorization request with Darrold Blizzard. Reference # [id number].

## 2021-01-19 NOTE — TELEPHONE ENCOUNTER
Patient calling in regards to MRI .  He is not sure if MRI is authorized or if he has to see provider first

## 2021-01-19 NOTE — TELEPHONE ENCOUNTER
L/m on Pt's v/m advising him of MRI denial. Advised him to keep f/u appt. on 01/21/21 at 9 am. for further options.

## 2021-01-21 ENCOUNTER — OFFICE VISIT (OUTPATIENT)
Dept: NEUROLOGY | Facility: CLINIC | Age: 56
End: 2021-01-21
Payer: COMMERCIAL

## 2021-01-21 VITALS — HEIGHT: 66 IN | BODY MASS INDEX: 26.68 KG/M2 | WEIGHT: 166 LBS

## 2021-01-21 DIAGNOSIS — M54.12 CERVICAL RADICULOPATHY: ICD-10-CM

## 2021-01-21 DIAGNOSIS — M75.41 ROTATOR CUFF IMPINGEMENT SYNDROME OF RIGHT SHOULDER: Primary | ICD-10-CM

## 2021-01-21 PROCEDURE — 99215 OFFICE O/P EST HI 40 MIN: CPT | Performed by: PHYSICAL MEDICINE & REHABILITATION

## 2021-01-21 PROCEDURE — 3008F BODY MASS INDEX DOCD: CPT | Performed by: PHYSICAL MEDICINE & REHABILITATION

## 2021-01-21 RX ORDER — DICLOFENAC SODIUM 75 MG/1
75 TABLET, DELAYED RELEASE ORAL DAILY PRN
Qty: 30 TABLET | Refills: 0 | Status: SHIPPED | OUTPATIENT
Start: 2021-01-21 | End: 2021-04-09 | Stop reason: ALTCHOICE

## 2021-01-21 RX ORDER — DOXEPIN HYDROCHLORIDE 50 MG/1
50 CAPSULE ORAL NIGHTLY
Qty: 30 CAPSULE | Refills: 0 | Status: SHIPPED | OUTPATIENT
Start: 2021-01-21 | End: 2021-04-09 | Stop reason: ALTCHOICE

## 2021-01-21 NOTE — PROGRESS NOTES
Progress note    C/C: Right shoulder pain    HPI: 46-year-old male presents for follow-up.   He continues to have right shoulder aching, throbbing, stabbing pain that radiates down the upper arm, can worsen with overhead activity, with upper body dressing,

## 2021-01-27 ENCOUNTER — TELEPHONE (OUTPATIENT)
Dept: FAMILY MEDICINE CLINIC | Facility: CLINIC | Age: 56
End: 2021-01-27

## 2021-01-27 NOTE — TELEPHONE ENCOUNTER
Received SUBPOENA via regular mail today, 1/27/21, from [address] [place] [address] with their check no [id number], issued 1/25/21, for $25.00. Faxed to [facility] recs today. Case no [id number], Newly Weds [address] original to [employer] today.   Made copy

## 2021-02-19 ENCOUNTER — TELEPHONE (OUTPATIENT)
Dept: FAMILY MEDICINE CLINIC | Facility: CLINIC | Age: 56
End: 2021-02-19

## 2021-02-19 NOTE — TELEPHONE ENCOUNTER
Received a request for medical records from   [facility] [employer]. A Subpoena, Requesting all medical records and itemized billing statements. Request faxed to medical records and a copy sent to [employer] with payment that was provided.

## 2021-03-12 ENCOUNTER — TELEPHONE (OUTPATIENT)
Dept: PHYSICAL THERAPY | Facility: HOSPITAL | Age: 56
End: 2021-03-12

## 2021-03-12 ENCOUNTER — APPOINTMENT (OUTPATIENT)
Dept: PHYSICAL THERAPY | Age: 56
End: 2021-03-12
Attending: PHYSICAL MEDICINE & REHABILITATION
Payer: COMMERCIAL

## 2021-03-15 ENCOUNTER — OFFICE VISIT (OUTPATIENT)
Dept: PHYSICAL THERAPY | Age: 56
End: 2021-03-15
Attending: FAMILY MEDICINE
Payer: COMMERCIAL

## 2021-03-15 DIAGNOSIS — M75.41 ROTATOR CUFF IMPINGEMENT SYNDROME OF RIGHT SHOULDER: ICD-10-CM

## 2021-03-15 PROCEDURE — 97110 THERAPEUTIC EXERCISES: CPT

## 2021-03-15 PROCEDURE — 97161 PT EVAL LOW COMPLEX 20 MIN: CPT

## 2021-03-15 NOTE — PROGRESS NOTES
SHOULDER EVALUATION:   Referring Physician: Dr. Alejandra Diallo  Diagnosis: R shoulder pain     Date of Service: 3/15/2021     PATIENT SUMMARY   Gwen Ingram is a 50year old male who presents to therapy today with complaints of throbbing pain in R shoulder fo 5/5  IR: R 4+/5; L 5/5 Rhomboids: R4+/5, L 4+/5  Mid trap: R 4+/5; L 4+/5   Lats: R 5/5, L 5/5  Low trap: R 4/5; L 4/5     Special tests:   Positive kidd-kerry, positive painful arc    Today’s Treatment and Response:   Pt education was provided on exa over a 90 day period.  Treatment will include: Manual Therapy, Neuromuscular Re-education, Therapeutic Exercise and Home Exercise Program instruction    Education or treatment limitation: None  Rehab Potential:good    FOTO: 57/100    Patient/Family/Caregive

## 2021-03-19 ENCOUNTER — APPOINTMENT (OUTPATIENT)
Dept: PHYSICAL THERAPY | Age: 56
End: 2021-03-19
Attending: PHYSICAL MEDICINE & REHABILITATION
Payer: COMMERCIAL

## 2021-03-22 ENCOUNTER — OFFICE VISIT (OUTPATIENT)
Dept: PHYSICAL THERAPY | Age: 56
End: 2021-03-22
Attending: PHYSICAL MEDICINE & REHABILITATION
Payer: COMMERCIAL

## 2021-03-22 PROCEDURE — 97110 THERAPEUTIC EXERCISES: CPT

## 2021-03-22 PROCEDURE — 97140 MANUAL THERAPY 1/> REGIONS: CPT

## 2021-03-22 NOTE — PROGRESS NOTES
Dx: R shoulder pain         Insurance (Authorized # of Visits):  Cigna,30 visits           Authorizing Physician: Dr. Tor Mosquera MD visit: none scheduled  Fall Risk: standard         Precautions: n/a             Subjective: Still sore, but trying to not man therapy, therapeutic ex 2       Total Timed Treatment: 40 min  Total Treatment Time: 40 min

## 2021-03-25 ENCOUNTER — TELEPHONE (OUTPATIENT)
Dept: FAMILY MEDICINE CLINIC | Facility: CLINIC | Age: 56
End: 2021-03-25

## 2021-03-25 NOTE — TELEPHONE ENCOUNTER
I apologize if I'm missing something, but I don't see where he and I talked about doing a colonoscopy. My records show he's due 12/2022.

## 2021-03-25 NOTE — TELEPHONE ENCOUNTER
Daughter called, pt with her. Pt was just wondering if there was a reason Dr. Latisha Case wanted pt to have a colonoscopy? Did she find something wrong? Please call daughter at [phone].

## 2021-03-25 NOTE — TELEPHONE ENCOUNTER
Patient's daughter advised of the information per Dr. Marcial Villavicencio. Daughter verbalized understanding.

## 2021-03-26 ENCOUNTER — OFFICE VISIT (OUTPATIENT)
Dept: PHYSICAL THERAPY | Age: 56
End: 2021-03-26
Attending: PHYSICAL MEDICINE & REHABILITATION
Payer: COMMERCIAL

## 2021-03-26 PROCEDURE — 97140 MANUAL THERAPY 1/> REGIONS: CPT

## 2021-03-26 PROCEDURE — 97110 THERAPEUTIC EXERCISES: CPT

## 2021-03-26 NOTE — PROGRESS NOTES
Dx: R shoulder pain         Insurance (Authorized # of Visits):  Cigna,30 visits           Authorizing Physician: Dr. Demetrius Main  Next MD visit: none scheduled  Fall Risk: standard         Precautions: n/a             Subjective:  Increased pain in muscle betw red 2x10  SB walk walks 2x10      MANUAL THERAPY  STM R UT 8'  [address] mob inf/post grade 3  5' MANUAL THERAPY  [facility] R UT 12' [address] mob inf/post grade 3  5'                    HEP: SL ER, wand flex, scap retract, row, UT stretch    Charges: man therapy, therapeuti

## 2021-03-29 ENCOUNTER — OFFICE VISIT (OUTPATIENT)
Dept: PHYSICAL THERAPY | Age: 56
End: 2021-03-29
Attending: PHYSICAL MEDICINE & REHABILITATION
Payer: COMMERCIAL

## 2021-03-29 PROCEDURE — 97110 THERAPEUTIC EXERCISES: CPT

## 2021-03-29 PROCEDURE — 97140 MANUAL THERAPY 1/> REGIONS: CPT

## 2021-03-29 NOTE — PROGRESS NOTES
Dx: R shoulder pain         Insurance (Authorized # of Visits):  Cigna,30 visits           Authorizing Physician: Dr. Claudia Stock  Next MD visit: none scheduled  Fall Risk: standard         Precautions: n/a             Subjective: Pain not too bad today, usual walk walks 2x10 THERAPEUTIC EX  UBE 3/3  UT stretch 3x30\"  Shoulder PROM all planes  SL ER 1# 2x10  tband ext red 2x10  tband row red 2x10  Stand flex 1# 2x10 B  SB walk walks 2x10     MANUAL THERAPY  STM R UT 8'  [facility] [facility] mob inf/post grade 3  5' MANUAL THERAP

## 2021-04-01 ENCOUNTER — OFFICE VISIT (OUTPATIENT)
Dept: PHYSICAL THERAPY | Age: 56
End: 2021-04-01
Attending: PHYSICAL MEDICINE & REHABILITATION
Payer: COMMERCIAL

## 2021-04-01 PROCEDURE — 97140 MANUAL THERAPY 1/> REGIONS: CPT

## 2021-04-01 PROCEDURE — 97110 THERAPEUTIC EXERCISES: CPT

## 2021-04-01 NOTE — PROGRESS NOTES
Dx: R shoulder pain         Insurance (Authorized # of Visits):  Radhana,30 visits           Authorizing Physician: Dr. Lazara Elkins  Next MD visit: none scheduled  Fall Risk: standard         Precautions: n/a             Subjective:  Worked 12hrs yesterday, incre all planes  SL ER 1# 2x10  tband ext red 2x10  tband row red 2x10  Stand flex 1# 2x10 B  SB walk walks 2x10 THERAPEUTIC EX  UBE 3/3  UT stretch 3x30\"  Shoulder PROM all planes  SL ER 1# 2x10  tband ext red 2x10  tband row red 2x10  Stand flex 1# 2x10 B  S

## 2021-04-06 ENCOUNTER — OFFICE VISIT (OUTPATIENT)
Dept: PHYSICAL THERAPY | Age: 56
End: 2021-04-06
Attending: PHYSICAL MEDICINE & REHABILITATION
Payer: COMMERCIAL

## 2021-04-06 PROCEDURE — 97110 THERAPEUTIC EXERCISES: CPT

## 2021-04-06 PROCEDURE — 97140 MANUAL THERAPY 1/> REGIONS: CPT

## 2021-04-06 NOTE — PROGRESS NOTES
Dx: R shoulder pain         Insurance (Authorized # of Visits):  Cigna,30 visits           Authorizing Physician: Dr. Dileep Haley  Next MD visit: none scheduled  Fall Risk: standard         Precautions: n/a             Subjective: Lifted several 50lbs boxes ye 3/3  UT stretch 3x30\"  Shoulder PROM all planes  Wand flex in supine 2x10  SL ER 2x10  tband row red 2x10  SB walk walks 2x10 THERAPEUTIC EX  UBE 3/3  UT stretch 3x30\"  Shoulder PROM all planes  SL ER 1# 2x10  tband ext red 2x10  tband row red 2x10  Lisa Loud

## 2021-04-07 ENCOUNTER — TELEPHONE (OUTPATIENT)
Dept: FAMILY MEDICINE CLINIC | Facility: CLINIC | Age: 56
End: 2021-04-07

## 2021-04-07 NOTE — TELEPHONE ENCOUNTER
ELLA Pt is coming in on 4/13 for pre-op appointment.  Pt is having hernia surgery with Dr. Jordi Urrutia on 4/20 @ [facility].

## 2021-04-09 ENCOUNTER — OFFICE VISIT (OUTPATIENT)
Dept: PHYSICAL MEDICINE AND REHAB | Facility: CLINIC | Age: 56
End: 2021-04-09
Payer: COMMERCIAL

## 2021-04-09 ENCOUNTER — OFFICE VISIT (OUTPATIENT)
Dept: PHYSICAL THERAPY | Age: 56
End: 2021-04-09
Attending: PHYSICAL MEDICINE & REHABILITATION
Payer: COMMERCIAL

## 2021-04-09 VITALS
SYSTOLIC BLOOD PRESSURE: 120 MMHG | DIASTOLIC BLOOD PRESSURE: 88 MMHG | HEART RATE: 76 BPM | BODY MASS INDEX: 27 KG/M2 | WEIGHT: 166 LBS | OXYGEN SATURATION: 96 %

## 2021-04-09 DIAGNOSIS — M54.12 CERVICAL RADICULOPATHY: ICD-10-CM

## 2021-04-09 DIAGNOSIS — M75.41 SUBACROMIAL IMPINGEMENT OF RIGHT SHOULDER: Primary | ICD-10-CM

## 2021-04-09 PROCEDURE — 99214 OFFICE O/P EST MOD 30 MIN: CPT | Performed by: PHYSICAL MEDICINE & REHABILITATION

## 2021-04-09 PROCEDURE — 97110 THERAPEUTIC EXERCISES: CPT

## 2021-04-09 PROCEDURE — 3079F DIAST BP 80-89 MM HG: CPT | Performed by: PHYSICAL MEDICINE & REHABILITATION

## 2021-04-09 PROCEDURE — 3074F SYST BP LT 130 MM HG: CPT | Performed by: PHYSICAL MEDICINE & REHABILITATION

## 2021-04-09 PROCEDURE — 97140 MANUAL THERAPY 1/> REGIONS: CPT

## 2021-04-09 RX ORDER — DOXEPIN HYDROCHLORIDE 25 MG/1
25 CAPSULE ORAL NIGHTLY
Qty: 90 CAPSULE | Refills: 0 | Status: SHIPPED | OUTPATIENT
Start: 2021-04-09

## 2021-04-09 RX ORDER — DICLOFENAC SODIUM 75 MG/1
75 TABLET, DELAYED RELEASE ORAL DAILY PRN
Qty: 90 TABLET | Refills: 0 | Status: SHIPPED | OUTPATIENT
Start: 2021-04-09 | End: 2021-08-09

## 2021-04-09 NOTE — PROGRESS NOTES
Patient presents in office today with reported pain in right shoulder and arm    Current pain level reported = 8/10    Last reported dose of Ibuprofen yesterday

## 2021-04-09 NOTE — PROGRESS NOTES
Progress Summary  Pt has attended 7 visits in Physical Therapy.    Dx: R shoulder pain         Insurance (Authorized # of Visits):  Cigna,30 visits           Authorizing Physician: Dr. Dileep Mosquera MD visit: 4/9/2021  Fall Risk: standard         Precautio stabilization with lifting and reaching   · Pt will be independent and compliant with comprehensive HEP to maintain progress achieved in PT     Plan: Continue skilled Physical Therapy 2 x/week for an additionalf 8 visits over a 90 day period.  Treatment rai 3x30\"  Shoulder PROM all planes  SL ER 1# 2x10  tband ext red 2x10  tband row red 2x10  Stand flex 1# 2x10 B  SB wall walks 2x10  tband ER yellow 3x10   MANUAL THERAPY  STM R UT 8'  [facility] mob inf/post grade 3  5' MANUAL THERAPY  [facility] R UT 12'  [facility] mob inf/post

## 2021-04-10 NOTE — PROGRESS NOTES
Progress note    C/C: right neck and shoulder pain    HPI: 49 yo m presents for f/u. Speaks primarily [address], daughter translates Has done approximately 4 weeks of PT without improvement.  Continues to have right shoulder and neck pain centered mostly over

## 2021-04-12 ENCOUNTER — OFFICE VISIT (OUTPATIENT)
Dept: PHYSICAL THERAPY | Age: 56
End: 2021-04-12
Attending: PHYSICAL MEDICINE & REHABILITATION
Payer: COMMERCIAL

## 2021-04-12 PROCEDURE — 97110 THERAPEUTIC EXERCISES: CPT

## 2021-04-12 PROCEDURE — 97140 MANUAL THERAPY 1/> REGIONS: CPT

## 2021-04-12 NOTE — PROGRESS NOTES
Dx: R shoulder pain         Insurance (Authorized # of Visits):  Cigna,30 visits           Authorizing Physician: Dr. James Mosquera MD visit: 4/9/2021  Fall Risk: standard         Precautions: n/a             Subjective: MD said he feels pain more from s Date: 4/6/2021  Tx#: 6/10 Date: 4/9/2021  Tx#: 7/10 Date: 4/12/2021  Tx#: 8/10   THERAPEUTIC EX  UBE 3/3  Shoulder PROM all planes  Wand flex in supine 2x10  SL ER 2x10  tband row red 2x10  SB walk walks 2x10 THERAPEUTIC EX  UBE 3/3  UT stretch 3x30\"  Judit

## 2021-04-13 ENCOUNTER — TELEPHONE (OUTPATIENT)
Dept: NEUROLOGY | Facility: CLINIC | Age: 56
End: 2021-04-13

## 2021-04-13 ENCOUNTER — OFFICE VISIT (OUTPATIENT)
Dept: FAMILY MEDICINE CLINIC | Facility: CLINIC | Age: 56
End: 2021-04-13
Payer: COMMERCIAL

## 2021-04-13 VITALS
SYSTOLIC BLOOD PRESSURE: 110 MMHG | WEIGHT: 171.63 LBS | TEMPERATURE: 98 F | OXYGEN SATURATION: 99 % | BODY MASS INDEX: 27.58 KG/M2 | DIASTOLIC BLOOD PRESSURE: 70 MMHG | HEIGHT: 66 IN | HEART RATE: 74 BPM

## 2021-04-13 DIAGNOSIS — N44.2 TESTICULAR CYST: ICD-10-CM

## 2021-04-13 DIAGNOSIS — E78.1 HYPERTRIGLYCERIDEMIA: ICD-10-CM

## 2021-04-13 DIAGNOSIS — Z01.818 PRE-OP EVALUATION: Primary | ICD-10-CM

## 2021-04-13 DIAGNOSIS — I70.90 ATHEROSCLEROSIS: ICD-10-CM

## 2021-04-13 PROCEDURE — 85025 COMPLETE CBC W/AUTO DIFF WBC: CPT | Performed by: FAMILY MEDICINE

## 2021-04-13 PROCEDURE — 93000 ELECTROCARDIOGRAM COMPLETE: CPT | Performed by: FAMILY MEDICINE

## 2021-04-13 PROCEDURE — 3008F BODY MASS INDEX DOCD: CPT | Performed by: FAMILY MEDICINE

## 2021-04-13 PROCEDURE — 3074F SYST BP LT 130 MM HG: CPT | Performed by: FAMILY MEDICINE

## 2021-04-13 PROCEDURE — 3078F DIAST BP <80 MM HG: CPT | Performed by: FAMILY MEDICINE

## 2021-04-13 PROCEDURE — 99213 OFFICE O/P EST LOW 20 MIN: CPT | Performed by: FAMILY MEDICINE

## 2021-04-13 RX ORDER — ATORVASTATIN CALCIUM 10 MG/1
10 TABLET, FILM COATED ORAL NIGHTLY
Qty: 90 TABLET | Refills: 1 | Status: SHIPPED | OUTPATIENT
Start: 2021-04-13

## 2021-04-13 NOTE — TELEPHONE ENCOUNTER
Alexi Gu is no Date on the letter. Like when he will be go back on full duty. Please call to advice.

## 2021-04-13 NOTE — H&P
Misael Iqbal is a 50year old male who presents for a pre-operative physical exam and clearance at the surgeon's request.     Patient is to have L epididymectomy with cystectomy, possible left orchiectomy, to be done by Dr. Sarah Miramontes at [facility] on 4/20/21. CERVICAL EPIDURAL STEROID INJECTION N/A 12/26/2018    Performed by Isabel Byrd MD at [facility]   • CERVICAL EPIDURAL STEROID INJECTION N/A 12/17/2018    Performed by Isabel Byrd MD at [address] [facility] OR   • CHOLECYSTECTOMY     • OTHER  03/14/2019    anterior ce sensory are grossly intact    ASSESSMENT AND PLAN:   Mckenna Akins is a 50year old male who presents for a pre-operative physical exam. Patient is to have L epididymectomy with cystectomy, possible left orchiectomy, to be done by Dr. Tiana Spivey at [facility] on 4/

## 2021-04-13 NOTE — TELEPHONE ENCOUNTER
Spoke to pt to relay info below. Pt asked when he would be able to schedule MRI. Advised pt that it is still pending, office will contact him once approved. Pt GIOVANA. Pt asks if the letter provided during [address] can be sent to him via [facility].  Advised pt that it would

## 2021-04-14 ENCOUNTER — TELEPHONE (OUTPATIENT)
Dept: FAMILY MEDICINE CLINIC | Facility: CLINIC | Age: 56
End: 2021-04-14

## 2021-04-16 NOTE — TELEPHONE ENCOUNTER
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 I had explain to him we can't put a date until  he has the MRI and made an appointment with Dr. Faith Malik.

## 2021-04-16 NOTE — TELEPHONE ENCOUNTER
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

## 2021-05-06 ENCOUNTER — TELEPHONE (OUTPATIENT)
Dept: NEUROLOGY | Facility: CLINIC | Age: 56
End: 2021-05-06

## 2021-05-06 NOTE — TELEPHONE ENCOUNTER
Pt checking on PA status for MRI ordered by Dr. Fran Richard. His w/c has denied, pt would like to use his health ins.   Forwarding to PA team.

## 2021-05-11 NOTE — TELEPHONE ENCOUNTER
Denial for MRI Shoulder [id number]    Denial reason: They will not approve. 1) They need to see a plain xray taken after symptoms started or changed. 2) follow up (in person,by phone) needed after patient has failed recent (within three months) six-week trail

## 2021-05-12 NOTE — TELEPHONE ENCOUNTER
Appeal letter rcv'd via fax from [facility] office and forwarded by [facility] PA dept. Faxed appeal letter along with OV notes to [phone]. Confirmation rcv'd.

## 2021-05-21 ENCOUNTER — TELEPHONE (OUTPATIENT)
Dept: PAIN CLINIC | Facility: CLINIC | Age: 56
End: 2021-05-21

## 2021-05-21 DIAGNOSIS — M54.12 CERVICAL RADICULOPATHY: Primary | ICD-10-CM

## 2021-05-21 RX ORDER — METHYLPREDNISOLONE 4 MG/1
TABLET ORAL
Qty: 1 PACKAGE | Refills: 0 | Status: SHIPPED | OUTPATIENT
Start: 2021-05-21 | End: 2021-06-25 | Stop reason: ALTCHOICE

## 2021-05-21 NOTE — TELEPHONE ENCOUNTER
Spoke to pt's daughter, Natacha Dowling, as pt is Pashto speaking. Relayed info below, which Kyleigh AKHTAR with repeat. Informed Kyleigh we are in office until 4 PM today if there are any other questions. Kyleigh AKHTAR.

## 2021-05-21 NOTE — TELEPHONE ENCOUNTER
Having shoulder pain   Pt wondering if his MRI will be approved  Does he need to see Dr. Sunshine Perez? Please call to advice.

## 2021-05-21 NOTE — TELEPHONE ENCOUNTER
MRI currently being appealed, initiated on 5/12/21. May take approximately 30 business days to receive a response.

## 2021-05-21 NOTE — TELEPHONE ENCOUNTER
Recommend medrol dosepak, await appeal. Will send prescription to pharmacy. Stop the diclofenac while taking the medrol dosepak.

## 2021-06-03 ENCOUNTER — TELEPHONE (OUTPATIENT)
Dept: PAIN CLINIC | Facility: CLINIC | Age: 56
End: 2021-06-03

## 2021-06-03 ENCOUNTER — HOSPITAL ENCOUNTER (OUTPATIENT)
Dept: MRI IMAGING | Facility: HOSPITAL | Age: 56
Discharge: HOME | End: 2021-06-03
Attending: PHYSICAL MEDICINE & REHABILITATION
Payer: COMMERCIAL

## 2021-06-03 DIAGNOSIS — M75.41 SUBACROMIAL IMPINGEMENT OF RIGHT SHOULDER: ICD-10-CM

## 2021-06-03 PROCEDURE — 73221 MRI JOINT UPR EXTREM W/O DYE: CPT | Performed by: PHYSICAL MEDICINE & REHABILITATION

## 2021-06-03 NOTE — TELEPHONE ENCOUNTER
----- Message from Cody Brar DO sent at 6/3/2021 12:17 PM CDT -----  MRI of the right shoulder reviewed; there is rotator cuff overuse without tear, moderate osteoarthritis in the right shoulder. These can be treated nonsurgically.  Would recommend rig

## 2021-06-07 NOTE — TELEPHONE ENCOUNTER
Spoke with patient's daughter Camelia Rubinstein along with patient on speaker. Discussed MRI results and recommendations per Dr Debbie Singh below. Patient stated he would like to think about it and will call back in a week or two when he is ready to proceed .  Advised p

## 2021-06-09 NOTE — TELEPHONE ENCOUNTER
Pt called requesting to speak to RN. States he would like some more information about the MRI results and recommendations from Dr Debbie Singh.      Please advise    Pt stated he works 10am to 10pm and will try reaching our office again tomorrow shiva if RN is

## 2021-06-09 NOTE — TELEPHONE ENCOUNTER
Spoke with Kyleigh, patient's daughter and scheduled a follow up visit with Dr Debbie Singh in office 6/25/2021 at 9 AM to review Imaging and discuss recommendations.

## 2021-06-25 ENCOUNTER — OFFICE VISIT (OUTPATIENT)
Dept: PHYSICAL MEDICINE AND REHAB | Facility: CLINIC | Age: 56
End: 2021-06-25
Payer: COMMERCIAL

## 2021-06-25 ENCOUNTER — TELEPHONE (OUTPATIENT)
Dept: NEUROLOGY | Facility: CLINIC | Age: 56
End: 2021-06-25

## 2021-06-25 VITALS
HEART RATE: 82 BPM | DIASTOLIC BLOOD PRESSURE: 88 MMHG | OXYGEN SATURATION: 96 % | SYSTOLIC BLOOD PRESSURE: 132 MMHG | WEIGHT: 171 LBS | BODY MASS INDEX: 28 KG/M2

## 2021-06-25 DIAGNOSIS — M75.41 SUBACROMIAL IMPINGEMENT OF RIGHT SHOULDER: Primary | ICD-10-CM

## 2021-06-25 PROCEDURE — 99215 OFFICE O/P EST HI 40 MIN: CPT | Performed by: PHYSICAL MEDICINE & REHABILITATION

## 2021-06-25 PROCEDURE — 3079F DIAST BP 80-89 MM HG: CPT | Performed by: PHYSICAL MEDICINE & REHABILITATION

## 2021-06-25 PROCEDURE — 3075F SYST BP GE 130 - 139MM HG: CPT | Performed by: PHYSICAL MEDICINE & REHABILITATION

## 2021-06-25 NOTE — PROGRESS NOTES
Progress note    C/C: right neck, shoulder pain    HPI: 49year old male presents for follow up. Continues to have right cervical/thoracic spine pain and shoulder pain radiating to the upper arm. Numbness and tingling only in the hand.  Has been doing 10-12 measures 1.1 cm in maximal dimension. Tonia Band    GLENOHUMERAL JOINT: Valente Minus is glenohumeral joint osteoarthritis suggested with subchondral cystic changes and edema along the posterior superior margin of the glenoid, also with an associated adjacent paralabral cys osteoarthritis  3) history of cervical disc arthroplasty    Due to persistent symptoms despite PT and NSAIDs would recommend right subacromial bursa steroid injection under US guidance. Continue current medications. Work restrictions given.     F/u for inje

## 2021-06-25 NOTE — TELEPHONE ENCOUNTER
Medical clearance needed- None/None    Meds to hold - None    Implanted cardiac device/SCS/PNS: None    Anesthesia Type: local    Office or CFS: Office    Please list entire procedure name: right subacromial bursa steroid injection under US guidance  Later

## 2021-06-25 NOTE — TELEPHONE ENCOUNTER
Prior authorization request completed for: Right Subacromial Bursa Steroid Injection with [address] # No Prior Authorization/Nor Pre Determination is Required.    Spoke with Jody Carlos at [address] [place] [address]  Reference #:

## 2021-06-25 NOTE — TELEPHONE ENCOUNTER
Patient called back, jo ann Arizmendi was present to help assist with translation. Pt gave verbal ok for daughter to assist with scheduling. Patient scheduled for procedure, pre-procedure instructions reviewed.  Patient advised to hold ASA 81mg and Diclofenac for 2 procedures will require 3 days), Oxaprozin (Daypro), Diclofenac (Voltaren), Indomethacin (Indocin), Etodolac (Lodine), Nabumetone (Relafen), Celebrex (Celecoxib)                         HERBAL SUPPLEMENTS  5 days  Fish oil, krill oil, Omega-3, vitamin E, T

## 2021-07-30 ENCOUNTER — OFFICE VISIT (OUTPATIENT)
Dept: PHYSICAL MEDICINE AND REHAB | Facility: CLINIC | Age: 56
End: 2021-07-30
Payer: COMMERCIAL

## 2021-07-30 VITALS
DIASTOLIC BLOOD PRESSURE: 74 MMHG | SYSTOLIC BLOOD PRESSURE: 118 MMHG | HEART RATE: 86 BPM | BODY MASS INDEX: 28 KG/M2 | OXYGEN SATURATION: 98 % | WEIGHT: 172 LBS

## 2021-07-30 DIAGNOSIS — M75.41 SUBACROMIAL IMPINGEMENT OF RIGHT SHOULDER: Primary | ICD-10-CM

## 2021-07-30 PROCEDURE — 20611 DRAIN/INJ JOINT/BURSA W/US: CPT | Performed by: PHYSICAL MEDICINE & REHABILITATION

## 2021-07-30 PROCEDURE — 3078F DIAST BP <80 MM HG: CPT | Performed by: PHYSICAL MEDICINE & REHABILITATION

## 2021-07-30 PROCEDURE — 3074F SYST BP LT 130 MM HG: CPT | Performed by: PHYSICAL MEDICINE & REHABILITATION

## 2021-07-30 RX ORDER — LIDOCAINE HYDROCHLORIDE 10 MG/ML
3 INJECTION, SOLUTION INFILTRATION; PERINEURAL ONCE
Status: COMPLETED | OUTPATIENT
Start: 2021-07-30 | End: 2021-07-30

## 2021-07-30 RX ORDER — METHYLPREDNISOLONE ACETATE 40 MG/ML
40 INJECTION, SUSPENSION INTRA-ARTICULAR; INTRALESIONAL; INTRAMUSCULAR; SOFT TISSUE ONCE
Status: COMPLETED | OUTPATIENT
Start: 2021-07-30 | End: 2021-07-30

## 2021-07-30 NOTE — PROGRESS NOTES
Timeout completed prior to procedure @ 0915. Participants present for timeout:  Dr. Chaparrita Dash, and patient.

## 2021-07-30 NOTE — PROCEDURES
Dx: right rotator cuff tendinopathy  Procedure: right subacromial bursa steroid injection under US guidance    The patient is here for a right shoulder subdeltoid bursal injection done under ultrasound guidance.   Under ultrasound guidance the right lateral

## 2021-08-06 ENCOUNTER — OFFICE VISIT (OUTPATIENT)
Dept: PHYSICAL MEDICINE AND REHAB | Facility: CLINIC | Age: 56
End: 2021-08-06
Payer: COMMERCIAL

## 2021-08-06 ENCOUNTER — TELEPHONE (OUTPATIENT)
Dept: NEUROLOGY | Facility: CLINIC | Age: 56
End: 2021-08-06

## 2021-08-06 VITALS
OXYGEN SATURATION: 97 % | HEART RATE: 73 BPM | SYSTOLIC BLOOD PRESSURE: 128 MMHG | DIASTOLIC BLOOD PRESSURE: 90 MMHG | BODY MASS INDEX: 26 KG/M2 | RESPIRATION RATE: 16 BRPM | WEIGHT: 164 LBS

## 2021-08-06 DIAGNOSIS — M75.41 SUBACROMIAL IMPINGEMENT OF RIGHT SHOULDER: Primary | ICD-10-CM

## 2021-08-06 DIAGNOSIS — M54.12 CERVICAL RADICULOPATHY: ICD-10-CM

## 2021-08-06 DIAGNOSIS — M19.011 OSTEOARTHRITIS OF RIGHT SHOULDER, UNSPECIFIED OSTEOARTHRITIS TYPE: ICD-10-CM

## 2021-08-06 PROCEDURE — 3074F SYST BP LT 130 MM HG: CPT | Performed by: PHYSICAL MEDICINE & REHABILITATION

## 2021-08-06 PROCEDURE — 99214 OFFICE O/P EST MOD 30 MIN: CPT | Performed by: PHYSICAL MEDICINE & REHABILITATION

## 2021-08-06 PROCEDURE — 3080F DIAST BP >= 90 MM HG: CPT | Performed by: PHYSICAL MEDICINE & REHABILITATION

## 2021-08-06 RX ORDER — TIZANIDINE 2 MG/1
2 TABLET ORAL NIGHTLY PRN
Qty: 30 TABLET | Refills: 0 | Status: SHIPPED | OUTPATIENT
Start: 2021-08-06

## 2021-08-06 NOTE — PROGRESS NOTES
Progress note    C/C: right shoulder pain    HPI: 82-year-old primarily English-speaking male presents for follow-up. No relief following right subacromial bursa steroid injection under ultrasound guidance.   He continues to have primarily right anterior l

## 2021-08-06 NOTE — TELEPHONE ENCOUNTER
Medical clearance needed- Ailin / Dr. Parisi    Meds to hold - None    Implanted cardiac device/SCS/PNS: None    Anesthesia Type: local    Office or CFS: Office    Please list entire procedure name: right glenohumeral joint injection under US guidance  Anika Kaye

## 2021-08-07 DIAGNOSIS — M54.12 CERVICAL RADICULOPATHY: ICD-10-CM

## 2021-08-09 RX ORDER — DICLOFENAC SODIUM 75 MG/1
TABLET, DELAYED RELEASE ORAL
Qty: 90 TABLET | Refills: 0 | Status: SHIPPED | OUTPATIENT
Start: 2021-08-09 | End: 2021-10-04

## 2021-08-09 NOTE — TELEPHONE ENCOUNTER
Medication: Diclofenac Sodium 75 MG Oral Tab EC    Date of last refill: 04/09/21    Last office visit: 08/06/21  Due back to clinic per last office note:  na  Date next office visit scheduled:  none        Last OV note recommendation: Assessment and plan

## 2021-08-10 NOTE — TELEPHONE ENCOUNTER
Prior authorization request completed for: Right Glenohumeral Joint Injection w/US Guidance [address] #No Prior Authorization/Nor Pre Determination is Required   -Based on Medical Necessity   Spoke with Kyleigh at [place] [address]

## 2021-08-10 NOTE — TELEPHONE ENCOUNTER
Spoke with patient to advise approval was received for injection. Offered to scheduled. Pt stated he has an appointment with Dr. Mayo Box tomorrow 8/11 and wants to talk with him before having the injection.  Pt stated if he elects to proceed he will call ba

## 2021-08-11 ENCOUNTER — OFFICE VISIT (OUTPATIENT)
Dept: SURGERY | Facility: CLINIC | Age: 56
End: 2021-08-11
Payer: COMMERCIAL

## 2021-08-11 VITALS
WEIGHT: 164 LBS | RESPIRATION RATE: 16 BRPM | BODY MASS INDEX: 26 KG/M2 | HEART RATE: 87 BPM | OXYGEN SATURATION: 97 % | DIASTOLIC BLOOD PRESSURE: 82 MMHG | SYSTOLIC BLOOD PRESSURE: 118 MMHG

## 2021-08-11 DIAGNOSIS — R20.0 RIGHT ARM NUMBNESS: ICD-10-CM

## 2021-08-11 DIAGNOSIS — Z98.890 S/P CERVICAL DISC REPLACEMENT: ICD-10-CM

## 2021-08-11 DIAGNOSIS — M54.2 NECK PAIN: Primary | ICD-10-CM

## 2021-08-11 PROCEDURE — 3074F SYST BP LT 130 MM HG: CPT | Performed by: NEUROLOGICAL SURGERY

## 2021-08-11 PROCEDURE — 99213 OFFICE O/P EST LOW 20 MIN: CPT | Performed by: NEUROLOGICAL SURGERY

## 2021-08-11 PROCEDURE — 3079F DIAST BP 80-89 MM HG: CPT | Performed by: NEUROLOGICAL SURGERY

## 2021-08-11 RX ORDER — DIAZEPAM 10 MG/1
5 TABLET ORAL ONCE
Qty: 1 TABLET | Refills: 0 | Status: SHIPPED | OUTPATIENT
Start: 2021-08-11 | End: 2021-08-11

## 2021-08-11 NOTE — PROGRESS NOTES
[facility]  Neurosurgery Clinic Visit      HISTORY OF PRESENT ILLNESS:  Mirian Costello is a(n) 49year old male here for follow-up, last seen in 10/19. He has been working with Dr. Tor Heart for ongoing pain issues.   He notes neck pain, 118/82 (BP Location: Left arm, Patient Position: Sitting)   Pulse 87   Resp 16   Wt 164 lb (74.4 kg)   SpO2 97%   BMI 26.47 kg/m²   GENERAL:  Patient is a 49year old male in no acute distress.   NEUROLOGICAL:     Cognition: alert and oriented x 3    [place]

## 2021-08-17 ENCOUNTER — TELEPHONE (OUTPATIENT)
Dept: NEUROLOGY | Facility: CLINIC | Age: 56
End: 2021-08-17

## 2021-08-17 NOTE — TELEPHONE ENCOUNTER
Case#: [id number]    May Zuñiga and spoke Melvin Avelar to schedule peer to peer     Peer to peer scheduled for 8/18/21 at 3:15pm     Dr. Shani Burris will be calling clinic to speak to Zak Shea, [address] [place] [address] calendar updated     Informed Rios mac schedule     Call time: 18:12    Routed to provider to inform

## 2021-08-17 NOTE — TELEPHONE ENCOUNTER
Patient has been receiving conservative treatment with Dr. Shaheen Chen, including medication therapy, within the last 6 weeks. Please schedule peer to peer on one of my open slots tomorrow or Thursday.  Please ask Shaheen Chen to block the slot chosen    Thank you

## 2021-08-27 ENCOUNTER — HOSPITAL ENCOUNTER (OUTPATIENT)
Dept: GENERAL RADIOLOGY | Age: 56
Discharge: HOME | End: 2021-08-27
Attending: NEUROLOGICAL SURGERY
Payer: COMMERCIAL

## 2021-08-27 DIAGNOSIS — Z98.890 S/P CERVICAL DISC REPLACEMENT: ICD-10-CM

## 2021-08-27 DIAGNOSIS — M54.2 NECK PAIN: ICD-10-CM

## 2021-08-27 DIAGNOSIS — R20.0 RIGHT ARM NUMBNESS: ICD-10-CM

## 2021-08-27 PROCEDURE — 72052 X-RAY EXAM NECK SPINE 6/>VWS: CPT | Performed by: NEUROLOGICAL SURGERY

## 2021-08-31 ENCOUNTER — HOSPITAL ENCOUNTER (OUTPATIENT)
Dept: MRI IMAGING | Facility: HOSPITAL | Age: 56
Discharge: HOME | End: 2021-08-31
Attending: NEUROLOGICAL SURGERY
Payer: COMMERCIAL

## 2021-08-31 DIAGNOSIS — M54.2 NECK PAIN: ICD-10-CM

## 2021-08-31 DIAGNOSIS — R20.0 RIGHT ARM NUMBNESS: ICD-10-CM

## 2021-08-31 DIAGNOSIS — Z98.890 S/P CERVICAL DISC REPLACEMENT: ICD-10-CM

## 2021-08-31 PROCEDURE — A9575 INJ GADOTERATE MEGLUMI 0.1ML: HCPCS

## 2021-08-31 PROCEDURE — 72156 MRI NECK SPINE W/O & W/DYE: CPT | Performed by: NEUROLOGICAL SURGERY

## 2021-09-02 ENCOUNTER — OFFICE VISIT (OUTPATIENT)
Dept: SURGERY | Facility: CLINIC | Age: 56
End: 2021-09-02
Payer: COMMERCIAL

## 2021-09-02 VITALS — SYSTOLIC BLOOD PRESSURE: 124 MMHG | DIASTOLIC BLOOD PRESSURE: 84 MMHG | HEART RATE: 78 BPM

## 2021-09-02 DIAGNOSIS — M25.511 CHRONIC RIGHT SHOULDER PAIN: Primary | ICD-10-CM

## 2021-09-02 DIAGNOSIS — G89.29 CHRONIC RIGHT SHOULDER PAIN: Primary | ICD-10-CM

## 2021-09-02 PROCEDURE — 3079F DIAST BP 80-89 MM HG: CPT | Performed by: PHYSICIAN ASSISTANT

## 2021-09-02 PROCEDURE — 99214 OFFICE O/P EST MOD 30 MIN: CPT | Performed by: PHYSICIAN ASSISTANT

## 2021-09-02 PROCEDURE — 3074F SYST BP LT 130 MM HG: CPT | Performed by: PHYSICIAN ASSISTANT

## 2021-09-02 RX ORDER — DIAZEPAM 10 MG/1
TABLET ORAL
COMMUNITY
Start: 2021-08-11 | End: 2021-09-02 | Stop reason: ALTCHOICE

## 2021-09-02 NOTE — PROGRESS NOTES
Pt is here to review recent MRI, XR. MRI SPINE CERVICAL DONE 8/31/21  XR CERVICAL SPINE COMPLETE W/FLEX + EXT DONE 8/27/21    Pt states beginning 3 weeks ago he feels a burning sensation/warm sensation around neck area, that has not went away.  Usually fee

## 2021-09-02 NOTE — PROGRESS NOTES
Neurosurgery Clinic Visit  2021    Gwen Ingram PCP:  Ting Sotomayor MD    1965 MRN [id number]       CC:  Neck, Shoulder Pain    HPI:    Christian Hubbard is here for f/u.   He complains of focal posterior neck pain, points to [place]unction with burning father, and mother.     SOCIAL HISTORY:   reports that he has never smoked. He has never used smokeless tobacco. He reports previous alcohol use.  He reports that he does not use drugs.     ALLERGIES:     Penicillin G            OTHER (SEE COMMENTS)    Comm changes within the quadrilateral space. 4. Mild rotator cuff tendinosis diffusely without high-grade tendinopathy or tear.    5. Moderate glenohumeral joint osteoarthritis with subchondral sclerosis and cystic changes most notably along the posterior ma

## 2021-09-15 ENCOUNTER — TELEPHONE (OUTPATIENT)
Dept: SURGERY | Facility: CLINIC | Age: 56
End: 2021-09-15

## 2021-09-15 NOTE — TELEPHONE ENCOUNTER
Per last OV note on 9-2-21 \"Jeffery may not be tolerating the motion of his C6-7 ADR device. He has continued neck pain despite PT, medications, injections. Discussed possible removal with conversion to C6-7 ACDF.   Recommend Ortho eval for his isolated

## 2021-09-15 NOTE — TELEPHONE ENCOUNTER
Pt calling requesting note for work. Unsure of specifics he is looking for but is in pain and was told by Telly Morrison in the past to call if he ever needs a letter/note. Please call patient to discuss.

## 2021-09-16 NOTE — TELEPHONE ENCOUNTER
S:  Pt calling with questions about letter and possible disability, Bolivian speaking nurse responded to call and spoke to patient. B: pt had surgery with Dr. Ca Gaspar and needs to become established with new surgeon.      Per LOV notes from Dino John anyone else. R:  Sinhala speaking author informed pt it is always advised to be established with a PCP.   Informed him we recommend he make an appointment to be seen and discuss all his medical needs and concerns so that his PCP is aware of his situation message sent in [address] pt of message from [place], [place]

## 2021-09-22 ENCOUNTER — OFFICE VISIT (OUTPATIENT)
Dept: ORTHOPEDICS CLINIC | Facility: CLINIC | Age: 56
End: 2021-09-22
Payer: COMMERCIAL

## 2021-09-22 DIAGNOSIS — M24.111 DEGENERATIVE TEAR OF GLENOID LABRUM OF RIGHT SHOULDER: ICD-10-CM

## 2021-09-22 DIAGNOSIS — S43.431A PARALABRAL CYST OF RIGHT SHOULDER, INITIAL ENCOUNTER: Primary | ICD-10-CM

## 2021-09-22 PROCEDURE — 99205 OFFICE O/P NEW HI 60 MIN: CPT | Performed by: ORTHOPAEDIC SURGERY

## 2021-09-22 NOTE — H&P
[facility] - ORTHOPEDICS  [facility] [address] [id number]  [phone]     NEW PATIENT VISIT - HISTORY AND PHYSICAL EXAMINATION     Name: Brenda Suarez   MRN: [id number]  Date: 9/22/2021     CC: Right shou Medications   Medication Sig Dispense Refill   • DICLOFENAC 75 MG Oral Tab EC TAKE 1 TABLET BY MOUTH ONCE DAILY AS NEEDED FOR PAIN 90 tablet 0   • tiZANidine 2 MG Oral Tab Take 1 tablet (2 mg total) by mouth nightly as needed.  30 tablet 0   • atorvastatin extension. He does have some worsening of numbness and tingling with maneuvers that stress the cervical spine.       Deformity:   none  Atrophy:   none    Scapular winging: Negative    Palpation:     AC Joint  Negative  Biceps Tendon  Positive  Greater Tub INDICATIONS:  Z98.890 S/P cervical disc replacement R20.0 Right arm numbness M54.2 Neck pain  PATIENT STATED HISTORY: (As transcribed by Technologist)  Neck pain    FINDINGS:   Postoperative changes from disc prosthesis placement at C6-C7.   The anterior ma abnormality, spinal stenosis, or foraminal narrowing. C5-C6:  Evaluation at this level was limited by artifact from metallic hardware at O6-N4. There is a diffuse disc osteophyte complex noted.   This does not cause significant stenosis of central canal or is exhausted conservative measures including injection, physical therapy, rest and activity modification for the right shoulder–I would advocate for shoulder arthroscopy, labral debridement versus repair and cyst decompression.   Likely arthroscopic biceps

## 2021-09-24 ENCOUNTER — OFFICE VISIT (OUTPATIENT)
Dept: PHYSICAL MEDICINE AND REHAB | Facility: CLINIC | Age: 56
End: 2021-09-24
Payer: COMMERCIAL

## 2021-09-24 VITALS
BODY MASS INDEX: 26 KG/M2 | RESPIRATION RATE: 16 BRPM | OXYGEN SATURATION: 96 % | WEIGHT: 162 LBS | SYSTOLIC BLOOD PRESSURE: 120 MMHG | DIASTOLIC BLOOD PRESSURE: 90 MMHG | HEART RATE: 83 BPM

## 2021-09-24 DIAGNOSIS — M54.12 CERVICAL RADICULOPATHY: Primary | ICD-10-CM

## 2021-09-24 PROCEDURE — 3074F SYST BP LT 130 MM HG: CPT | Performed by: PHYSICAL MEDICINE & REHABILITATION

## 2021-09-24 PROCEDURE — 99214 OFFICE O/P EST MOD 30 MIN: CPT | Performed by: PHYSICAL MEDICINE & REHABILITATION

## 2021-09-24 PROCEDURE — 3080F DIAST BP >= 90 MM HG: CPT | Performed by: PHYSICAL MEDICINE & REHABILITATION

## 2021-09-24 RX ORDER — PREGABALIN 75 MG/1
75 CAPSULE ORAL 2 TIMES DAILY
Qty: 60 CAPSULE | Refills: 0 | Status: SHIPPED | OUTPATIENT
Start: 2021-09-24

## 2021-09-24 NOTE — PATIENT INSTRUCTIONS
Best to start the medication in the evening when you are not going to drive or operate heavy machinery. You can start the medication during the daytime only if you know you are not going to drive.  Take 1 capsule at night time for 1 week, then in the mornin

## 2021-09-25 NOTE — PROGRESS NOTES
Progress note    C/C: neck and right arm pain    HPI: 48 yo m presents for f/u. Primarily Lithuanian speaking, daughter interprets. Since the last time I saw him he had some imaging done with Dr. Mariana Thompson and is going to follow up with Dr. Eleanor Bender in November.  H osseous fracture is identified.  The prevertebral soft tissues within normal limits.  No significant bony foraminal narrowing is identified.  Mild scattered facet hypertrophy.                        Impression   CONCLUSION:  Postoperative changes from disc effects. He will remain off work for 2 weeks, and then return to work afterwards with his previous restrictions. If his pain worsens before he follows up with Dr. Anu Winters he should see me for follow up.     Noemy Morris DO  Physical Medicine and [facility]

## 2021-09-29 ENCOUNTER — TELEPHONE (OUTPATIENT)
Dept: PAIN CLINIC | Facility: CLINIC | Age: 56
End: 2021-09-29

## 2021-09-29 NOTE — TELEPHONE ENCOUNTER
Pt called requesting an updated letter from MD. Pt states the letter he obtained on 9/24 does not specify how long his restrictions should be in effect till.      Please advise     Pt is requesting a copy of the letter be sent to him via Zaplee and via Smartdate

## 2021-10-04 ENCOUNTER — OFFICE VISIT (OUTPATIENT)
Dept: FAMILY MEDICINE CLINIC | Facility: CLINIC | Age: 56
End: 2021-10-04
Payer: COMMERCIAL

## 2021-10-04 VITALS
SYSTOLIC BLOOD PRESSURE: 110 MMHG | TEMPERATURE: 97 F | DIASTOLIC BLOOD PRESSURE: 70 MMHG | BODY MASS INDEX: 27.43 KG/M2 | OXYGEN SATURATION: 98 % | HEIGHT: 65 IN | HEART RATE: 90 BPM | WEIGHT: 164.63 LBS

## 2021-10-04 DIAGNOSIS — Z56.89 WORK-RELATED CONDITION: ICD-10-CM

## 2021-10-04 DIAGNOSIS — Z02.79 MEDICAL CERTIFICATE ISSUANCE: Primary | ICD-10-CM

## 2021-10-04 DIAGNOSIS — M54.12 CERVICAL RADICULOPATHY: ICD-10-CM

## 2021-10-04 DIAGNOSIS — Z98.890 S/P CERVICAL DISC REPLACEMENT: ICD-10-CM

## 2021-10-04 DIAGNOSIS — Z56.89 DIFFICULTY PERFORMING WORK ACTIVITIES: ICD-10-CM

## 2021-10-04 PROCEDURE — 99214 OFFICE O/P EST MOD 30 MIN: CPT | Performed by: FAMILY MEDICINE

## 2021-10-04 PROCEDURE — 3074F SYST BP LT 130 MM HG: CPT | Performed by: FAMILY MEDICINE

## 2021-10-04 PROCEDURE — 3078F DIAST BP <80 MM HG: CPT | Performed by: FAMILY MEDICINE

## 2021-10-04 PROCEDURE — 3008F BODY MASS INDEX DOCD: CPT | Performed by: FAMILY MEDICINE

## 2021-10-04 RX ORDER — DICLOFENAC SODIUM 75 MG/1
75 TABLET, DELAYED RELEASE ORAL DAILY PRN
Qty: 90 TABLET | Refills: 0 | Status: SHIPPED | OUTPATIENT
Start: 2021-10-04 | End: 2021-11-22

## 2021-10-04 NOTE — PROGRESS NOTES
[facility] Family Medicine Office Note  Chief Complaint:   Patient presents with:  Back Pain  Shoulder Pain: Right   Medication Request: diclofenac and tizanidine       HPI:   This is a 49year old male coming in to establish care with me today: discectomy, arthroplasty   • OTHER SURGICAL HISTORY      \"ear surgery for hearing\" bilateral     Social History:  Social History    Tobacco Use      Smoking status: Never Smoker      Smokeless tobacco: Never Used    Vaping Use      Vaping Use: Never used Not in any acute distress, making good conversation, answering appropriately   SKIN: No pallor, no erythema, no cyanosis, warm and dry  Eyes: wnl, normal conjunctiva   HEAD: Normocephalic, atraumatic  EENT: OP - wnl, moist, Nares normal  NECK: FROM, supple to learning. Medical education done. Outcome: Patient verbalizes understanding. Patient is notified to call with any questions, complications, allergies, or worsening or changing symptoms.   Patient is to call with any side effects or complications from t

## 2021-10-08 ENCOUNTER — TELEPHONE (OUTPATIENT)
Dept: FAMILY MEDICINE CLINIC | Facility: CLINIC | Age: 56
End: 2021-10-08

## 2021-10-08 NOTE — TELEPHONE ENCOUNTER
FMLA paperwork completed by Dr. Frazier Fore copy placed in pt blue book for pt to  - need to notify pt    Copy sent to scanning

## 2021-10-09 NOTE — TELEPHONE ENCOUNTER
Language line used,  #[id number]    Patient advised of note below. Patient verbalized understanding and stated will stop by office today to . No further questions at this time.

## 2021-10-11 ENCOUNTER — MED REC SCAN ONLY (OUTPATIENT)
Dept: FAMILY MEDICINE CLINIC | Facility: CLINIC | Age: 56
End: 2021-10-11

## 2021-10-18 ENCOUNTER — OFFICE VISIT (OUTPATIENT)
Dept: FAMILY MEDICINE CLINIC | Facility: CLINIC | Age: 56
End: 2021-10-18
Payer: COMMERCIAL

## 2021-10-18 VITALS
TEMPERATURE: 98 F | RESPIRATION RATE: 18 BRPM | WEIGHT: 166.81 LBS | SYSTOLIC BLOOD PRESSURE: 140 MMHG | BODY MASS INDEX: 28 KG/M2 | HEART RATE: 83 BPM | DIASTOLIC BLOOD PRESSURE: 90 MMHG | OXYGEN SATURATION: 98 %

## 2021-10-18 DIAGNOSIS — R20.2 NUMBNESS AND TINGLING OF RIGHT ARM: ICD-10-CM

## 2021-10-18 DIAGNOSIS — G89.29 CHRONIC NECK PAIN: ICD-10-CM

## 2021-10-18 DIAGNOSIS — M54.2 CHRONIC NECK PAIN: ICD-10-CM

## 2021-10-18 DIAGNOSIS — R20.0 NUMBNESS AND TINGLING OF RIGHT ARM: ICD-10-CM

## 2021-10-18 DIAGNOSIS — M25.511 CHRONIC RIGHT SHOULDER PAIN: ICD-10-CM

## 2021-10-18 DIAGNOSIS — M54.12 CERVICAL RADICULOPATHY: Primary | ICD-10-CM

## 2021-10-18 DIAGNOSIS — G89.29 CHRONIC RIGHT SHOULDER PAIN: ICD-10-CM

## 2021-10-18 PROCEDURE — 99213 OFFICE O/P EST LOW 20 MIN: CPT | Performed by: NURSE PRACTITIONER

## 2021-10-18 PROCEDURE — 3080F DIAST BP >= 90 MM HG: CPT | Performed by: NURSE PRACTITIONER

## 2021-10-18 PROCEDURE — 3077F SYST BP >= 140 MM HG: CPT | Performed by: NURSE PRACTITIONER

## 2021-10-18 NOTE — PROGRESS NOTES
Subjective:   Patient ID: Mark Smith is a 49year old male. Patient presents to the clinic today for follow up of pain in neck and right arm/shoulder area. States neck and arm pain are worsening.  Is having pain in c spine with radiation down the ri (75 mg total) by mouth 2 (two) times daily. 60 capsule 0   • tiZANidine 2 MG Oral Tab Take 1 tablet (2 mg total) by mouth nightly as needed. 30 tablet 0   • atorvastatin 10 MG Oral Tab Take 1 tablet (10 mg total) by mouth nightly.  90 tablet 1   • ascorbic No prescriptions requested or ordered in this encounter       Imaging & Referrals:  None

## 2021-10-18 NOTE — PATIENT INSTRUCTIONS
Continue taking pregabalin as prescribed. Rest, letter for employer to excuse you ~11/11/2021 given today. Follow-up with neurosurgeon. ER precautions for any worsening symptoms, loss of upper extremity function, worsening pain/numbness and tingling.

## 2021-11-10 NOTE — PROGRESS NOTES
Previous patient of Dr. Skyla Kowalski had ANTERIOR CERVICAL [facility] ARTHROPLASTY 1 LEVEL on 3/14/2019    Pt sees Dr. Zahraa Jain,  last seen 9/24/21.  Per LOV with Dr. Eron Santos, \"Pain is felt typically from the lateral aspect of the elbow, up th the shoulder a

## 2021-11-11 ENCOUNTER — OFFICE VISIT (OUTPATIENT)
Dept: SURGERY | Facility: CLINIC | Age: 56
End: 2021-11-11
Payer: COMMERCIAL

## 2021-11-11 VITALS — HEART RATE: 76 BPM | SYSTOLIC BLOOD PRESSURE: 124 MMHG | DIASTOLIC BLOOD PRESSURE: 82 MMHG

## 2021-11-11 DIAGNOSIS — M25.511 CHRONIC RIGHT SHOULDER PAIN: ICD-10-CM

## 2021-11-11 DIAGNOSIS — G89.29 CHRONIC RIGHT SHOULDER PAIN: ICD-10-CM

## 2021-11-11 DIAGNOSIS — S43.431A LABRAL TEAR OF SHOULDER, RIGHT, INITIAL ENCOUNTER: Primary | ICD-10-CM

## 2021-11-11 DIAGNOSIS — Z98.890 S/P CERVICAL DISC REPLACEMENT: ICD-10-CM

## 2021-11-11 PROCEDURE — 3079F DIAST BP 80-89 MM HG: CPT | Performed by: NEUROLOGICAL SURGERY

## 2021-11-11 PROCEDURE — 3074F SYST BP LT 130 MM HG: CPT | Performed by: NEUROLOGICAL SURGERY

## 2021-11-11 PROCEDURE — 99214 OFFICE O/P EST MOD 30 MIN: CPT | Performed by: NEUROLOGICAL SURGERY

## 2021-11-11 NOTE — PROGRESS NOTES
Neurological Surgery Outpatient Clinic    Wilber Galicia  11/11/2021    Diagnosis: Cervical radiculopathy, 3/14/2019 C6-7 arthroplasty by Joy Medley MD    Chief complaint: Ongoing severe right shoulder pain when trying to work.       Interval History: H Medicine  Neurological [address] [facility] Neuro-Oncology [place] [address] [address]

## 2021-11-11 NOTE — PROGRESS NOTES
Patient reports pain in the chest and right shoulder, especially when the weather changes. Pain level at it's worst is 8-9/10, currently 8/10.    Patient reports he saw PCP and has been off of work for 6 weeks and PCP recommended he see Dr. Ronna Duggan for follo

## 2021-11-22 PROBLEM — M19.011 ARTHRITIS OF RIGHT GLENOHUMERAL JOINT: Status: ACTIVE | Noted: 2021-11-22

## 2021-11-22 PROBLEM — M75.51 ACUTE BURSITIS OF RIGHT SHOULDER: Status: ACTIVE | Noted: 2021-11-22

## 2021-11-22 PROBLEM — R29.898 SHOULDER WEAKNESS: Status: ACTIVE | Noted: 2021-11-22

## 2021-11-22 PROBLEM — M25.511 RIGHT SHOULDER PAIN, UNSPECIFIED CHRONICITY: Status: ACTIVE | Noted: 2021-11-22

## 2021-11-22 PROBLEM — S43.431A DEGENERATIVE SUPERIOR LABRAL ANTERIOR-TO-POSTERIOR (SLAP) TEAR OF RIGHT SHOULDER: Status: ACTIVE | Noted: 2021-11-22

## 2022-01-14 ENCOUNTER — TELEPHONE (OUTPATIENT)
Dept: FAMILY MEDICINE CLINIC | Facility: CLINIC | Age: 57
End: 2022-01-14

## 2022-01-14 NOTE — TELEPHONE ENCOUNTER
Patient's daughter called. Patient gave verification that information could be shared. Patient had surgery today, daughter had questions about post surgical.    All questions answered. Nothing further needed from MD or nurse. Closing encounter.

## 2022-01-26 ENCOUNTER — ORDER TRANSCRIPTION (OUTPATIENT)
Dept: PHYSICAL THERAPY | Facility: HOSPITAL | Age: 57
End: 2022-01-26

## 2022-01-26 DIAGNOSIS — M24.111 DEGENERATIVE TEAR OF GLENOID LABRUM OF RIGHT SHOULDER: ICD-10-CM

## 2022-01-26 DIAGNOSIS — S43.431A DEGENERATIVE SUPERIOR LABRAL ANTERIOR-TO-POSTERIOR (SLAP) TEAR OF RIGHT SHOULDER: Primary | ICD-10-CM

## 2022-01-31 ENCOUNTER — TELEPHONE (OUTPATIENT)
Dept: PHYSICAL THERAPY | Facility: HOSPITAL | Age: 57
End: 2022-01-31

## 2022-02-01 ENCOUNTER — OFFICE VISIT (OUTPATIENT)
Dept: PHYSICAL THERAPY | Age: 57
End: 2022-02-01
Attending: ORTHOPAEDIC SURGERY
Payer: COMMERCIAL

## 2022-02-01 DIAGNOSIS — S43.431A DEGENERATIVE SUPERIOR LABRAL ANTERIOR-TO-POSTERIOR (SLAP) TEAR OF RIGHT SHOULDER: ICD-10-CM

## 2022-02-01 DIAGNOSIS — M24.111 DEGENERATIVE TEAR OF GLENOID LABRUM OF RIGHT SHOULDER: ICD-10-CM

## 2022-02-01 PROCEDURE — 97162 PT EVAL MOD COMPLEX 30 MIN: CPT

## 2022-02-01 PROCEDURE — 97110 THERAPEUTIC EXERCISES: CPT

## 2022-02-08 ENCOUNTER — OFFICE VISIT (OUTPATIENT)
Dept: PHYSICAL THERAPY | Age: 57
End: 2022-02-08
Attending: ORTHOPAEDIC SURGERY
Payer: COMMERCIAL

## 2022-02-08 DIAGNOSIS — S43.431A DEGENERATIVE SUPERIOR LABRAL ANTERIOR-TO-POSTERIOR (SLAP) TEAR OF RIGHT SHOULDER: ICD-10-CM

## 2022-02-08 DIAGNOSIS — M24.111 DEGENERATIVE TEAR OF GLENOID LABRUM OF RIGHT SHOULDER: ICD-10-CM

## 2022-02-08 PROCEDURE — 97110 THERAPEUTIC EXERCISES: CPT

## 2022-02-08 PROCEDURE — 97140 MANUAL THERAPY 1/> REGIONS: CPT

## 2022-02-11 ENCOUNTER — OFFICE VISIT (OUTPATIENT)
Dept: PHYSICAL THERAPY | Age: 57
End: 2022-02-11
Attending: ORTHOPAEDIC SURGERY
Payer: COMMERCIAL

## 2022-02-11 DIAGNOSIS — S43.431A DEGENERATIVE SUPERIOR LABRAL ANTERIOR-TO-POSTERIOR (SLAP) TEAR OF RIGHT SHOULDER: ICD-10-CM

## 2022-02-11 DIAGNOSIS — M24.111 DEGENERATIVE TEAR OF GLENOID LABRUM OF RIGHT SHOULDER: ICD-10-CM

## 2022-02-11 PROCEDURE — 97110 THERAPEUTIC EXERCISES: CPT

## 2022-02-11 PROCEDURE — 97140 MANUAL THERAPY 1/> REGIONS: CPT

## 2022-02-14 ENCOUNTER — TELEPHONE (OUTPATIENT)
Dept: SURGERY | Facility: CLINIC | Age: 57
End: 2022-02-14

## 2022-02-14 NOTE — TELEPHONE ENCOUNTER
S:  Pt calling requesting to speak to [address] [facility] speaking nurse. Accepted call from , pt     B: Pt states he had surgery on his shoulder 1/14/22, he also had cervical surgery   3/14/2019 C6-7 arthroplasty by Daljit Salvador MD    A:  Pt states he went in for his post op for his shoulder surgery and was told he needed to come back to see his neurosurgeon. Pt states they told him something about his positioning during shoulder surgery. Pt reports that since his shoulder surgery he has been experiencing severe neck pain. Pt states he was proscribed some prednisone and some NORCO, but pain is still intense. R:  Pt requesting to make appt with Dr. Heidi Schneider, informed him we can schedule appt for him, next available is 4/7/22, pt wanting sooner date, offered sooner appt with PA. Pt accepted.      Appt scheduled for this Thursday 2/14/22-ALEXYS lety

## 2022-02-15 ENCOUNTER — OFFICE VISIT (OUTPATIENT)
Dept: PHYSICAL THERAPY | Age: 57
End: 2022-02-15
Attending: ORTHOPAEDIC SURGERY
Payer: COMMERCIAL

## 2022-02-15 DIAGNOSIS — S43.431A DEGENERATIVE SUPERIOR LABRAL ANTERIOR-TO-POSTERIOR (SLAP) TEAR OF RIGHT SHOULDER: ICD-10-CM

## 2022-02-15 DIAGNOSIS — M24.111 DEGENERATIVE TEAR OF GLENOID LABRUM OF RIGHT SHOULDER: ICD-10-CM

## 2022-02-15 PROCEDURE — 97110 THERAPEUTIC EXERCISES: CPT

## 2022-02-15 PROCEDURE — 97140 MANUAL THERAPY 1/> REGIONS: CPT

## 2022-02-16 NOTE — TELEPHONE ENCOUNTER
Pt never called back and today is his appt  Closing encounter
Pt sent a schedule request for checkup  Future Appointments   Date Time Provider Agnes Rocha   12/30/2020  8:30 AM Mir Lange MD [facility] [place] [facility] [place]   1/15/2021  1:00 PM DO ANGELINA Jimenez [facility] [place]     Per the notes he is having chest
Statement Selected

## 2022-02-18 ENCOUNTER — OFFICE VISIT (OUTPATIENT)
Dept: PHYSICAL THERAPY | Age: 57
End: 2022-02-18
Attending: ORTHOPAEDIC SURGERY
Payer: COMMERCIAL

## 2022-02-18 DIAGNOSIS — S43.431A DEGENERATIVE SUPERIOR LABRAL ANTERIOR-TO-POSTERIOR (SLAP) TEAR OF RIGHT SHOULDER: ICD-10-CM

## 2022-02-18 DIAGNOSIS — M24.111 DEGENERATIVE TEAR OF GLENOID LABRUM OF RIGHT SHOULDER: ICD-10-CM

## 2022-02-18 PROCEDURE — 97110 THERAPEUTIC EXERCISES: CPT

## 2022-02-18 PROCEDURE — 97140 MANUAL THERAPY 1/> REGIONS: CPT

## 2022-02-21 ENCOUNTER — TELEPHONE (OUTPATIENT)
Dept: PHYSICAL THERAPY | Facility: HOSPITAL | Age: 57
End: 2022-02-21

## 2022-02-21 ENCOUNTER — APPOINTMENT (OUTPATIENT)
Dept: GENERAL RADIOLOGY | Facility: HOSPITAL | Age: 57
End: 2022-02-21
Attending: NURSE PRACTITIONER
Payer: COMMERCIAL

## 2022-02-21 ENCOUNTER — HOSPITAL ENCOUNTER (EMERGENCY)
Facility: HOSPITAL | Age: 57
Discharge: HOME | End: 2022-02-21
Attending: EMERGENCY MEDICINE
Payer: COMMERCIAL

## 2022-02-21 ENCOUNTER — APPOINTMENT (OUTPATIENT)
Dept: PHYSICAL THERAPY | Age: 57
End: 2022-02-21
Attending: ORTHOPAEDIC SURGERY
Payer: COMMERCIAL

## 2022-02-21 ENCOUNTER — HOSPITAL ENCOUNTER (OUTPATIENT)
Age: 57
Discharge: ANOTHER HOSPITAL | End: 2022-02-21
Payer: COMMERCIAL

## 2022-02-21 VITALS
HEART RATE: 96 BPM | OXYGEN SATURATION: 97 % | RESPIRATION RATE: 18 BRPM | SYSTOLIC BLOOD PRESSURE: 158 MMHG | DIASTOLIC BLOOD PRESSURE: 93 MMHG | TEMPERATURE: 97 F

## 2022-02-21 VITALS
OXYGEN SATURATION: 98 % | TEMPERATURE: 98 F | RESPIRATION RATE: 18 BRPM | DIASTOLIC BLOOD PRESSURE: 103 MMHG | HEART RATE: 90 BPM | SYSTOLIC BLOOD PRESSURE: 149 MMHG

## 2022-02-21 DIAGNOSIS — M54.2 PAIN OF CERVICAL SPINE: Primary | ICD-10-CM

## 2022-02-21 DIAGNOSIS — M25.511 ACUTE PAIN OF RIGHT SHOULDER: ICD-10-CM

## 2022-02-21 DIAGNOSIS — R52 INTRACTABLE PAIN: Primary | ICD-10-CM

## 2022-02-21 PROCEDURE — 73030 X-RAY EXAM OF SHOULDER: CPT | Performed by: NURSE PRACTITIONER

## 2022-02-21 PROCEDURE — 99283 EMERGENCY DEPT VISIT LOW MDM: CPT

## 2022-02-21 PROCEDURE — 96372 THER/PROPH/DIAG INJ SC/IM: CPT

## 2022-02-21 PROCEDURE — A4565 SLINGS: HCPCS | Performed by: NURSE PRACTITIONER

## 2022-02-21 PROCEDURE — 72050 X-RAY EXAM NECK SPINE 4/5VWS: CPT | Performed by: NURSE PRACTITIONER

## 2022-02-21 PROCEDURE — 99205 OFFICE O/P NEW HI 60 MIN: CPT | Performed by: NURSE PRACTITIONER

## 2022-02-21 RX ORDER — KETOROLAC TROMETHAMINE 30 MG/ML
60 INJECTION, SOLUTION INTRAMUSCULAR; INTRAVENOUS ONCE
Status: COMPLETED | OUTPATIENT
Start: 2022-02-21 | End: 2022-02-21

## 2022-02-21 NOTE — ED INITIAL ASSESSMENT (HPI)
Pt sts right shoulder surgery 1/14/22. 2 weeks ago noted increased pain to right shoulder/ anterior and posterior neck with pain radiating to right elbow. Pain has been increasing over the past 2 weeks.

## 2022-02-21 NOTE — ED INITIAL ASSESSMENT (HPI)
Pt had surgery 1/14, pt seen by his doctor a week ago and every thing look good. Pt unable to attend PT , difficulty sleeping. Pt taking norco 5/325mg taken last night and steroids. No medications today.

## 2022-02-22 ENCOUNTER — APPOINTMENT (OUTPATIENT)
Dept: PHYSICAL THERAPY | Age: 57
End: 2022-02-22
Attending: ORTHOPAEDIC SURGERY
Payer: COMMERCIAL

## 2022-02-22 ENCOUNTER — TELEPHONE (OUTPATIENT)
Dept: SURGERY | Facility: CLINIC | Age: 57
End: 2022-02-22

## 2022-02-22 ENCOUNTER — OFFICE VISIT (OUTPATIENT)
Dept: SURGERY | Facility: CLINIC | Age: 57
End: 2022-02-22
Payer: COMMERCIAL

## 2022-02-22 VITALS — DIASTOLIC BLOOD PRESSURE: 70 MMHG | SYSTOLIC BLOOD PRESSURE: 120 MMHG

## 2022-02-22 DIAGNOSIS — G95.9 CERVICAL MYELOPATHY WITH CERVICAL RADICULOPATHY (HCC): Primary | ICD-10-CM

## 2022-02-22 DIAGNOSIS — R29.898 WEAKNESS OF BOTH UPPER EXTREMITIES: ICD-10-CM

## 2022-02-22 DIAGNOSIS — Z98.890 S/P CERVICAL DISC REPLACEMENT: ICD-10-CM

## 2022-02-22 DIAGNOSIS — M54.12 CERVICAL MYELOPATHY WITH CERVICAL RADICULOPATHY (HCC): Primary | ICD-10-CM

## 2022-02-22 DIAGNOSIS — M54.2 NECK PAIN: ICD-10-CM

## 2022-02-22 DIAGNOSIS — R29.898 WEAKNESS OF BOTH LOWER EXTREMITIES: ICD-10-CM

## 2022-02-22 PROCEDURE — 3078F DIAST BP <80 MM HG: CPT | Performed by: PHYSICIAN ASSISTANT

## 2022-02-22 PROCEDURE — 3074F SYST BP LT 130 MM HG: CPT | Performed by: PHYSICIAN ASSISTANT

## 2022-02-22 PROCEDURE — 99213 OFFICE O/P EST LOW 20 MIN: CPT | Performed by: PHYSICIAN ASSISTANT

## 2022-02-22 RX ORDER — HYDROCODONE BITARTRATE AND ACETAMINOPHEN 5; 325 MG/1; MG/1
1 TABLET ORAL EVERY 6 HOURS PRN
Qty: 42 TABLET | Refills: 0 | Status: SHIPPED | OUTPATIENT
Start: 2022-02-22 | End: 2022-04-04

## 2022-02-22 RX ORDER — HYDROCODONE BITARTRATE AND ACETAMINOPHEN 10; 325 MG/1; MG/1
1 TABLET ORAL EVERY 4 HOURS PRN
Qty: 60 TABLET | Refills: 0 | Status: SHIPPED | OUTPATIENT
Start: 2022-02-22 | End: 2022-03-02

## 2022-02-22 NOTE — PROGRESS NOTES
He states he has had increased discomfort from his right shoulder up into his neck. worsening shoulder/neck pain, has some  n/t, was in the ED and UC. On 2.21.22. they gave him a injection and states it helped him.

## 2022-02-22 NOTE — TELEPHONE ENCOUNTER
Received notification that patient's medication order is now for Norco 5-325 mg Q 6 hours. Called pharmacist David Leung who stated that number of tablets allowed per insurance will be 28. Called patient to inform him that medication order has been placed and sent to [facility]. Call placed via language line rep Carri Mcclain. [facility]. Ovonyxt message sent to patient with above information.

## 2022-02-23 ENCOUNTER — OFFICE VISIT (OUTPATIENT)
Dept: FAMILY MEDICINE CLINIC | Facility: CLINIC | Age: 57
End: 2022-02-23
Payer: COMMERCIAL

## 2022-02-23 VITALS
HEART RATE: 83 BPM | SYSTOLIC BLOOD PRESSURE: 138 MMHG | TEMPERATURE: 97 F | RESPIRATION RATE: 18 BRPM | WEIGHT: 176 LBS | OXYGEN SATURATION: 96 % | BODY MASS INDEX: 28 KG/M2 | DIASTOLIC BLOOD PRESSURE: 82 MMHG

## 2022-02-23 DIAGNOSIS — F41.8 ANXIETY ABOUT HEALTH: ICD-10-CM
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
## 2022-02-23 DIAGNOSIS — M25.511 ACUTE PAIN OF RIGHT SHOULDER: ICD-10-CM

## 2022-02-23 PROCEDURE — 3075F SYST BP GE 130 - 139MM HG: CPT | Performed by: FAMILY MEDICINE

## 2022-02-23 PROCEDURE — 3079F DIAST BP 80-89 MM HG: CPT | Performed by: FAMILY MEDICINE

## 2022-02-23 PROCEDURE — 99214 OFFICE O/P EST MOD 30 MIN: CPT | Performed by: FAMILY MEDICINE

## 2022-02-25 ENCOUNTER — OFFICE VISIT (OUTPATIENT)
Dept: PHYSICAL THERAPY | Age: 57
End: 2022-02-25
Attending: ORTHOPAEDIC SURGERY
Payer: COMMERCIAL

## 2022-02-25 DIAGNOSIS — S43.431A DEGENERATIVE SUPERIOR LABRAL ANTERIOR-TO-POSTERIOR (SLAP) TEAR OF RIGHT SHOULDER: ICD-10-CM

## 2022-02-25 DIAGNOSIS — M24.111 DEGENERATIVE TEAR OF GLENOID LABRUM OF RIGHT SHOULDER: ICD-10-CM

## 2022-02-25 PROCEDURE — 97110 THERAPEUTIC EXERCISES: CPT

## 2022-02-25 PROCEDURE — 97140 MANUAL THERAPY 1/> REGIONS: CPT

## 2022-03-01 ENCOUNTER — OFFICE VISIT (OUTPATIENT)
Dept: PHYSICAL THERAPY | Age: 57
End: 2022-03-01
Attending: ORTHOPAEDIC SURGERY
Payer: COMMERCIAL

## 2022-03-01 DIAGNOSIS — M24.111 DEGENERATIVE TEAR OF GLENOID LABRUM OF RIGHT SHOULDER: ICD-10-CM

## 2022-03-01 DIAGNOSIS — S43.431A DEGENERATIVE SUPERIOR LABRAL ANTERIOR-TO-POSTERIOR (SLAP) TEAR OF RIGHT SHOULDER: ICD-10-CM

## 2022-03-01 PROCEDURE — 97140 MANUAL THERAPY 1/> REGIONS: CPT

## 2022-03-01 PROCEDURE — 97110 THERAPEUTIC EXERCISES: CPT

## 2022-03-02 ENCOUNTER — OFFICE VISIT (OUTPATIENT)
Dept: PAIN CLINIC | Facility: CLINIC | Age: 57
End: 2022-03-02
Payer: COMMERCIAL

## 2022-03-02 VITALS
BODY MASS INDEX: 28 KG/M2 | DIASTOLIC BLOOD PRESSURE: 80 MMHG | WEIGHT: 176 LBS | HEART RATE: 78 BPM | SYSTOLIC BLOOD PRESSURE: 120 MMHG | OXYGEN SATURATION: 99 %

## 2022-03-02 DIAGNOSIS — M54.12 CERVICAL RADICULOPATHY: Primary | ICD-10-CM

## 2022-03-02 DIAGNOSIS — M75.51 ACUTE BURSITIS OF RIGHT SHOULDER: ICD-10-CM

## 2022-03-02 DIAGNOSIS — Z98.890 S/P CERVICAL DISC REPLACEMENT: ICD-10-CM

## 2022-03-02 DIAGNOSIS — S43.431A DEGENERATIVE SUPERIOR LABRAL ANTERIOR-TO-POSTERIOR (SLAP) TEAR OF RIGHT SHOULDER: ICD-10-CM

## 2022-03-02 PROCEDURE — 99204 OFFICE O/P NEW MOD 45 MIN: CPT | Performed by: ANESTHESIOLOGY

## 2022-03-02 PROCEDURE — 3079F DIAST BP 80-89 MM HG: CPT | Performed by: ANESTHESIOLOGY

## 2022-03-02 PROCEDURE — 3074F SYST BP LT 130 MM HG: CPT | Performed by: ANESTHESIOLOGY

## 2022-03-03 ENCOUNTER — TELEPHONE (OUTPATIENT)
Dept: NEUROLOGY | Facility: CLINIC | Age: 57
End: 2022-03-03

## 2022-03-03 NOTE — TELEPHONE ENCOUNTER
Prior authorization request completed for: PAOLA   Authorization # [id number]  Authorization dates: 3/3/2022 - 6/1/2022   CPT codes approved: 67835  Number of visits/dates of service approved: 1   Physician: Dr[facility]  Location: [facility] to schedule

## 2022-03-04 ENCOUNTER — OFFICE VISIT (OUTPATIENT)
Dept: PHYSICAL THERAPY | Age: 57
End: 2022-03-04
Attending: ORTHOPAEDIC SURGERY
Payer: COMMERCIAL

## 2022-03-04 DIAGNOSIS — S43.431A DEGENERATIVE SUPERIOR LABRAL ANTERIOR-TO-POSTERIOR (SLAP) TEAR OF RIGHT SHOULDER: ICD-10-CM

## 2022-03-04 DIAGNOSIS — M24.111 DEGENERATIVE TEAR OF GLENOID LABRUM OF RIGHT SHOULDER: ICD-10-CM

## 2022-03-04 PROCEDURE — 97110 THERAPEUTIC EXERCISES: CPT

## 2022-03-04 PROCEDURE — 97140 MANUAL THERAPY 1/> REGIONS: CPT

## 2022-03-07 ENCOUNTER — TELEPHONE (OUTPATIENT)
Dept: NEUROLOGY | Facility: CLINIC | Age: 57
End: 2022-03-07

## 2022-03-07 ENCOUNTER — OFFICE VISIT (OUTPATIENT)
Dept: FAMILY MEDICINE CLINIC | Facility: CLINIC | Age: 57
End: 2022-03-07
Payer: COMMERCIAL

## 2022-03-07 ENCOUNTER — TELEPHONE (OUTPATIENT)
Dept: PHYSICAL THERAPY | Facility: HOSPITAL | Age: 57
End: 2022-03-07

## 2022-03-07 VITALS
SYSTOLIC BLOOD PRESSURE: 138 MMHG | TEMPERATURE: 98 F | OXYGEN SATURATION: 97 % | DIASTOLIC BLOOD PRESSURE: 88 MMHG | HEART RATE: 89 BPM | WEIGHT: 174.5 LBS | RESPIRATION RATE: 16 BRPM | BODY MASS INDEX: 28 KG/M2

## 2022-03-07 DIAGNOSIS — G89.29 CHRONIC RIGHT SHOULDER PAIN: Primary | ICD-10-CM

## 2022-03-07 DIAGNOSIS — M25.511 CHRONIC RIGHT SHOULDER PAIN: Primary | ICD-10-CM

## 2022-03-07 DIAGNOSIS — G47.01 INSOMNIA DUE TO MEDICAL CONDITION: ICD-10-CM

## 2022-03-07 DIAGNOSIS — F41.8 ANXIETY ABOUT HEALTH: ICD-10-CM

## 2022-03-07 DIAGNOSIS — M54.12 CERVICAL RADICULOPATHY: ICD-10-CM

## 2022-03-07 PROCEDURE — 3079F DIAST BP 80-89 MM HG: CPT | Performed by: FAMILY MEDICINE

## 2022-03-07 PROCEDURE — 99214 OFFICE O/P EST MOD 30 MIN: CPT | Performed by: FAMILY MEDICINE

## 2022-03-07 PROCEDURE — 3075F SYST BP GE 130 - 139MM HG: CPT | Performed by: FAMILY MEDICINE

## 2022-03-07 RX ORDER — ALPRAZOLAM 0.25 MG/1
TABLET ORAL NIGHTLY PRN
Qty: 30 TABLET | Refills: 0 | Status: SHIPPED | OUTPATIENT
Start: 2022-03-07 | End: 2022-03-22

## 2022-03-07 NOTE — TELEPHONE ENCOUNTER
Question Answer   Anesthesia Type Sedation   Provider Rekha Ledesma   Location Lab   Procedure Cervical KAREN   Medical clearance requested (will send to Pain Navigator) No   Patient has Medicare coverage?  No   Comments (Please list entire procedure name here.) cervical epidural steroid injection

## 2022-03-07 NOTE — TELEPHONE ENCOUNTER
Case:  [id number]    Called Timothy and spoke to Jaci NDIAYE to schedule peer to peer    Jaci states that d/t high call volume she can not connect us to support unit to schedule peer to peer until after 1:30pm today. Will call back to schedule P2P after 1:30pm today.

## 2022-03-08 ENCOUNTER — OFFICE VISIT (OUTPATIENT)
Dept: PHYSICAL THERAPY | Age: 57
End: 2022-03-08
Attending: ORTHOPAEDIC SURGERY
Payer: COMMERCIAL

## 2022-03-08 ENCOUNTER — TELEPHONE (OUTPATIENT)
Dept: FAMILY MEDICINE CLINIC | Facility: CLINIC | Age: 57
End: 2022-03-08

## 2022-03-08 DIAGNOSIS — M24.111 DEGENERATIVE TEAR OF GLENOID LABRUM OF RIGHT SHOULDER: ICD-10-CM

## 2022-03-08 DIAGNOSIS — S43.431A DEGENERATIVE SUPERIOR LABRAL ANTERIOR-TO-POSTERIOR (SLAP) TEAR OF RIGHT SHOULDER: ICD-10-CM

## 2022-03-08 PROCEDURE — 97110 THERAPEUTIC EXERCISES: CPT

## 2022-03-08 PROCEDURE — 97140 MANUAL THERAPY 1/> REGIONS: CPT

## 2022-03-08 NOTE — TELEPHONE ENCOUNTER
Pt requesting own medical records from 01/01/2018 to present. Sent request to scan stat.  Made copy of request and sent to scanning

## 2022-03-08 NOTE — TELEPHONE ENCOUNTER
Case:  [id number]    Christen Ingram and spoke to Suresh Saab to schedule peer to peer     Peer to peer scheduled for 3/11/22 at 1:30pm    Dr. Stacy Perez will be calling to speak with DAMI Jerez     [place] calendar updated, Ruth Kehr included    Call time: 19:42    Routed to Ruth Kehr to block time

## 2022-03-11 ENCOUNTER — OFFICE VISIT (OUTPATIENT)
Dept: PHYSICAL THERAPY | Age: 57
End: 2022-03-11
Attending: ORTHOPAEDIC SURGERY
Payer: COMMERCIAL

## 2022-03-11 DIAGNOSIS — M24.111 DEGENERATIVE TEAR OF GLENOID LABRUM OF RIGHT SHOULDER: ICD-10-CM

## 2022-03-11 DIAGNOSIS — S43.431A DEGENERATIVE SUPERIOR LABRAL ANTERIOR-TO-POSTERIOR (SLAP) TEAR OF RIGHT SHOULDER: ICD-10-CM

## 2022-03-11 PROCEDURE — 97110 THERAPEUTIC EXERCISES: CPT

## 2022-03-11 PROCEDURE — 97140 MANUAL THERAPY 1/> REGIONS: CPT

## 2022-03-11 NOTE — TELEPHONE ENCOUNTER
[id number] was completed with Dr. Ruslan Avelar    Prior authorization was given for CT myelogram of the cervical spine  [id number]  Expiration 5/24/2022

## 2022-03-14 ENCOUNTER — LAB ENCOUNTER (OUTPATIENT)
Dept: LAB | Facility: HOSPITAL | Age: 57
End: 2022-03-14
Attending: ANESTHESIOLOGY
Payer: COMMERCIAL

## 2022-03-14 DIAGNOSIS — M54.12 CERVICAL RADICULOPATHY: ICD-10-CM

## 2022-03-14 NOTE — TELEPHONE ENCOUNTER
Authorization confirmed and noted in referral.  Patient notified and then transferred to [employer] to schedule.   Thank you

## 2022-03-15 ENCOUNTER — APPOINTMENT (OUTPATIENT)
Dept: PHYSICAL THERAPY | Age: 57
End: 2022-03-15
Attending: ORTHOPAEDIC SURGERY
Payer: COMMERCIAL

## 2022-03-15 LAB — SARS-COV-2 RNA RESP QL NAA+PROBE: NOT DETECTED

## 2022-03-16 ENCOUNTER — TELEPHONE (OUTPATIENT)
Dept: SURGERY | Facility: CLINIC | Age: 57
End: 2022-03-16

## 2022-03-16 NOTE — TELEPHONE ENCOUNTER
Rec'd medical record request from [employer] for disability benefits from DOS 12/1/21-present. Original sent to SCAN/STAT. Copy sent to scanning.

## 2022-03-17 ENCOUNTER — HOSPITAL ENCOUNTER (OUTPATIENT)
Facility: HOSPITAL | Age: 57
Setting detail: HOSPITAL OUTPATIENT SURGERY
Discharge: HOME | End: 2022-03-17
Attending: ANESTHESIOLOGY | Admitting: ANESTHESIOLOGY
Payer: COMMERCIAL

## 2022-03-17 ENCOUNTER — APPOINTMENT (OUTPATIENT)
Dept: GENERAL RADIOLOGY | Facility: HOSPITAL | Age: 57
End: 2022-03-17
Attending: ANESTHESIOLOGY
Payer: COMMERCIAL

## 2022-03-17 VITALS
HEIGHT: 66 IN | BODY MASS INDEX: 27.97 KG/M2 | WEIGHT: 174 LBS | OXYGEN SATURATION: 96 % | TEMPERATURE: 97 F | SYSTOLIC BLOOD PRESSURE: 113 MMHG | RESPIRATION RATE: 18 BRPM | DIASTOLIC BLOOD PRESSURE: 60 MMHG | HEART RATE: 77 BPM

## 2022-03-17 DIAGNOSIS — M54.12 CERVICAL RADICULOPATHY: ICD-10-CM

## 2022-03-17 PROCEDURE — B01B1ZZ FLUOROSCOPY OF SPINAL CORD USING LOW OSMOLAR CONTRAST: ICD-10-PCS | Performed by: ANESTHESIOLOGY

## 2022-03-17 PROCEDURE — 99152 MOD SED SAME PHYS/QHP 5/>YRS: CPT | Performed by: ANESTHESIOLOGY

## 2022-03-17 PROCEDURE — 3E0S33Z INTRODUCTION OF ANTI-INFLAMMATORY INTO EPIDURAL SPACE, PERCUTANEOUS APPROACH: ICD-10-PCS | Performed by: ANESTHESIOLOGY

## 2022-03-17 RX ORDER — SODIUM CHLORIDE 9 MG/ML
INJECTION INTRAVENOUS
Status: DISCONTINUED | OUTPATIENT
Start: 2022-03-17 | End: 2022-03-17

## 2022-03-17 RX ORDER — ONDANSETRON 2 MG/ML
4 INJECTION INTRAMUSCULAR; INTRAVENOUS ONCE AS NEEDED
Status: DISCONTINUED | OUTPATIENT
Start: 2022-03-17 | End: 2022-03-17

## 2022-03-17 RX ORDER — DEXAMETHASONE SODIUM PHOSPHATE 10 MG/ML
INJECTION, SOLUTION INTRAMUSCULAR; INTRAVENOUS
Status: DISCONTINUED | OUTPATIENT
Start: 2022-03-17 | End: 2022-03-17

## 2022-03-17 RX ORDER — LIDOCAINE HYDROCHLORIDE 10 MG/ML
INJECTION, SOLUTION EPIDURAL; INFILTRATION; INTRACAUDAL; PERINEURAL
Status: DISCONTINUED | OUTPATIENT
Start: 2022-03-17 | End: 2022-03-17

## 2022-03-17 RX ORDER — SODIUM CHLORIDE, SODIUM LACTATE, POTASSIUM CHLORIDE, CALCIUM CHLORIDE 600; 310; 30; 20 MG/100ML; MG/100ML; MG/100ML; MG/100ML
100 INJECTION, SOLUTION INTRAVENOUS CONTINUOUS
Status: DISCONTINUED | OUTPATIENT
Start: 2022-03-17 | End: 2022-03-17

## 2022-03-17 RX ORDER — MIDAZOLAM HYDROCHLORIDE 1 MG/ML
INJECTION INTRAMUSCULAR; INTRAVENOUS
Status: DISCONTINUED | OUTPATIENT
Start: 2022-03-17 | End: 2022-03-17

## 2022-03-17 RX ORDER — DIPHENHYDRAMINE HYDROCHLORIDE 50 MG/ML
50 INJECTION INTRAMUSCULAR; INTRAVENOUS ONCE AS NEEDED
Status: DISCONTINUED | OUTPATIENT
Start: 2022-03-17 | End: 2022-03-17

## 2022-03-17 NOTE — OPERATIVE REPORT
[facility]  Operative Report  3/17/2022     Romana Gee Patient Status:  Hospital Outpatient Surgery    1965 MRN [id number]   Location [address] [place] [address] Attending Jalen Adames MD   Hosp Day # 0 [facility] Kranthihidee Nelson MD     Indication: Katlin Mendoza is a 49year old male with cervical radiculopathy    Preoperative Diagnosis:  Cervical radiculopathy [M54.12]    Postoperative Diagnosis: Same as above. Procedure performed: CERVICAL EPIDURAL STEROID INJECTION WITH SEDATION     Anesthesia: Local and IV Sedation. EBL: Less than 1 ml. Procedure Description:  After reviewing the patient's history and performing a focused physical examination, the diagnosis was confirmed and contraindications such as infection and coagulopathy were ruled out. Following review of allergies, potential side effects, and complications, including but not necessarily limited to infection, allergic reaction, local tissue breakdown, nerve injury, post-dural puncture headache and paresis, the patient indicated they understood and agreed to proceed. After obtaining the informed consent, the patient was brought to the procedure room and monitored. Per my order and under my supervision, the patient was sedated with intermittent intravenous doses of versed and fentanyl. The vital signs were monitored and recorded by an experienced RN. The procedure started after the patient was adequately sedated. Moderate intravenous conscious sedation was provided for 6 minutes. The patient was brought to the procedure room and positioned prone. After comprehensive monitors were applied, the patient's neck was prepped and draped sterilely. After local anesthetic was instilled in the skin and subcutaneous tissue, a 20-gauge Tuohy needle was introduced and advanced under fluoroscopy at C7-T1. The epidural space was reached by using a loss of resistance to air technique. There was no C. S.F. or blood through the needle. After obtaining a good epidurogram by injecting Omnipaque 180 1 mL, a combination of normal saline and dexamethasone 10 mg in total volume of 4 mL was injected. The needle was then flushed with normal saline 1 mL. The stylet re-applied. The needle was withdrawn with the tip intact. The patient tolerated the procedure very well and recovered and was discharged to a responsible adult after discharge criteria were met. Complications: None. Follow up: The patient was followed in the pain clinic as needed basis.           Shira Meraz MD

## 2022-03-18 ENCOUNTER — ORDER TRANSCRIPTION (OUTPATIENT)
Dept: PHYSICAL THERAPY | Facility: HOSPITAL | Age: 57
End: 2022-03-18

## 2022-03-18 ENCOUNTER — OFFICE VISIT (OUTPATIENT)
Dept: PHYSICAL THERAPY | Age: 57
End: 2022-03-18
Attending: ORTHOPAEDIC SURGERY
Payer: COMMERCIAL

## 2022-03-18 PROCEDURE — 97110 THERAPEUTIC EXERCISES: CPT

## 2022-03-18 PROCEDURE — 97140 MANUAL THERAPY 1/> REGIONS: CPT

## 2022-03-21 ENCOUNTER — LAB ENCOUNTER (OUTPATIENT)
Dept: LAB | Facility: HOSPITAL | Age: 57
End: 2022-03-21
Attending: PHYSICIAN ASSISTANT
Payer: COMMERCIAL

## 2022-03-21 DIAGNOSIS — G95.9 CERVICAL MYELOPATHY WITH CERVICAL RADICULOPATHY (HCC): ICD-10-CM

## 2022-03-21 DIAGNOSIS — Z98.890 S/P CERVICAL DISC REPLACEMENT: ICD-10-CM

## 2022-03-21 DIAGNOSIS — R29.898 WEAKNESS OF BOTH UPPER EXTREMITIES: ICD-10-CM

## 2022-03-21 DIAGNOSIS — M54.2 NECK PAIN: ICD-10-CM

## 2022-03-21 DIAGNOSIS — R29.898 WEAKNESS OF BOTH LOWER EXTREMITIES: ICD-10-CM

## 2022-03-21 DIAGNOSIS — M54.12 CERVICAL MYELOPATHY WITH CERVICAL RADICULOPATHY (HCC): ICD-10-CM

## 2022-03-21 LAB — SARS-COV-2 RNA RESP QL NAA+PROBE: NOT DETECTED

## 2022-03-22 ENCOUNTER — OFFICE VISIT (OUTPATIENT)
Dept: PHYSICAL THERAPY | Age: 57
End: 2022-03-22
Attending: PHYSICAL THERAPIST
Payer: COMMERCIAL

## 2022-03-22 ENCOUNTER — TELEPHONE (OUTPATIENT)
Dept: FAMILY MEDICINE CLINIC | Facility: CLINIC | Age: 57
End: 2022-03-22

## 2022-03-22 PROCEDURE — 97140 MANUAL THERAPY 1/> REGIONS: CPT

## 2022-03-22 PROCEDURE — 97110 THERAPEUTIC EXERCISES: CPT

## 2022-03-22 NOTE — TELEPHONE ENCOUNTER
RECEIVED MEDICAL RECORD REQUEST FROM [employer] -   REQUESTING RECORDS FROM 12/1/11 TO PRESENT.     FAXED OVER REQUESTED [address]

## 2022-03-23 RX ORDER — SODIUM CHLORIDE 9 MG/ML
INJECTION, SOLUTION INTRAVENOUS CONTINUOUS
OUTPATIENT
Start: 2022-03-23

## 2022-03-23 RX ORDER — DIAZEPAM 5 MG/1
10 TABLET ORAL
OUTPATIENT
Start: 2022-03-24 | End: 2022-03-24

## 2022-03-24 ENCOUNTER — HOSPITAL ENCOUNTER (OUTPATIENT)
Dept: CT IMAGING | Facility: HOSPITAL | Age: 57
Discharge: HOME | End: 2022-03-24
Attending: PHYSICIAN ASSISTANT
Payer: COMMERCIAL

## 2022-03-24 ENCOUNTER — HOSPITAL ENCOUNTER (OUTPATIENT)
Dept: GENERAL RADIOLOGY | Facility: HOSPITAL | Age: 57
Discharge: HOME | End: 2022-03-24
Attending: PHYSICIAN ASSISTANT
Payer: COMMERCIAL

## 2022-03-24 ENCOUNTER — LAB ENCOUNTER (OUTPATIENT)
Dept: LAB | Facility: HOSPITAL | Age: 57
End: 2022-03-24
Attending: PHYSICIAN ASSISTANT
Payer: COMMERCIAL

## 2022-03-24 VITALS
HEART RATE: 88 BPM | OXYGEN SATURATION: 97 % | DIASTOLIC BLOOD PRESSURE: 99 MMHG | RESPIRATION RATE: 18 BRPM | TEMPERATURE: 98 F | SYSTOLIC BLOOD PRESSURE: 125 MMHG

## 2022-03-24 VITALS
SYSTOLIC BLOOD PRESSURE: 148 MMHG | BODY MASS INDEX: 27.97 KG/M2 | RESPIRATION RATE: 24 BRPM | TEMPERATURE: 98 F | WEIGHT: 174 LBS | HEART RATE: 78 BPM | DIASTOLIC BLOOD PRESSURE: 102 MMHG | OXYGEN SATURATION: 94 % | HEIGHT: 66 IN

## 2022-03-24 DIAGNOSIS — R29.898 WEAKNESS OF BOTH LOWER EXTREMITIES: ICD-10-CM

## 2022-03-24 DIAGNOSIS — M54.2 NECK PAIN: ICD-10-CM

## 2022-03-24 DIAGNOSIS — G95.9 CERVICAL MYELOPATHY WITH CERVICAL RADICULOPATHY (HCC): ICD-10-CM

## 2022-03-24 DIAGNOSIS — M54.12 CERVICAL MYELOPATHY WITH CERVICAL RADICULOPATHY (HCC): ICD-10-CM

## 2022-03-24 DIAGNOSIS — R29.898 WEAKNESS OF BOTH UPPER EXTREMITIES: ICD-10-CM

## 2022-03-24 DIAGNOSIS — M54.12 CERVICAL RADICULOPATHY: ICD-10-CM

## 2022-03-24 DIAGNOSIS — Z98.890 S/P CERVICAL DISC REPLACEMENT: ICD-10-CM

## 2022-03-24 DIAGNOSIS — M54.12 CERVICAL RADICULOPATHY: Primary | ICD-10-CM

## 2022-03-24 LAB
ERYTHROCYTE [DISTWIDTH] IN BLOOD BY AUTOMATED COUNT: 12 %
HCT VFR BLD AUTO: 46.6 %
HGB BLD-MCNC: 15.6 G/DL
INR BLD: 1.06 (ref 0.8–1.2)
MCH RBC QN AUTO: 31.8 PG (ref 26–34)
MCHC RBC AUTO-ENTMCNC: 33.5 G/DL (ref 31–37)
MCV RBC AUTO: 94.9 FL
PLATELET # BLD AUTO: 240 10(3)UL (ref 150–450)
PROTHROMBIN TIME: 13.9 SECONDS (ref 11.6–14.8)
RBC # BLD AUTO: 4.91 X10(6)UL
WBC # BLD AUTO: 8.9 X10(3) UL (ref 4–11)

## 2022-03-24 PROCEDURE — 72126 CT NECK SPINE W/DYE: CPT | Performed by: PHYSICIAN ASSISTANT

## 2022-03-24 PROCEDURE — 85610 PROTHROMBIN TIME: CPT

## 2022-03-24 PROCEDURE — 36415 COLL VENOUS BLD VENIPUNCTURE: CPT

## 2022-03-24 PROCEDURE — 85027 COMPLETE CBC AUTOMATED: CPT | Performed by: RADIOLOGY

## 2022-03-24 PROCEDURE — 62302 MYELOGRAPHY LUMBAR INJECTION: CPT | Performed by: PHYSICIAN ASSISTANT

## 2022-03-24 RX ORDER — MORPHINE SULFATE 2 MG/ML
1 INJECTION, SOLUTION INTRAMUSCULAR; INTRAVENOUS EVERY 2 HOUR PRN
Status: DISCONTINUED | OUTPATIENT
Start: 2022-03-24 | End: 2022-03-26

## 2022-03-24 RX ORDER — MORPHINE SULFATE 4 MG/ML
4 INJECTION, SOLUTION INTRAMUSCULAR; INTRAVENOUS EVERY 2 HOUR PRN
Status: DISCONTINUED | OUTPATIENT
Start: 2022-03-24 | End: 2022-03-26

## 2022-03-24 RX ORDER — HYDROCODONE BITARTRATE AND ACETAMINOPHEN 5; 325 MG/1; MG/1
2 TABLET ORAL EVERY 4 HOURS PRN
Status: DISCONTINUED | OUTPATIENT
Start: 2022-03-24 | End: 2022-03-26

## 2022-03-24 RX ORDER — HYDROCODONE BITARTRATE AND ACETAMINOPHEN 5; 325 MG/1; MG/1
1 TABLET ORAL EVERY 4 HOURS PRN
Status: DISCONTINUED | OUTPATIENT
Start: 2022-03-24 | End: 2022-03-26

## 2022-03-24 RX ORDER — MORPHINE SULFATE 2 MG/ML
2 INJECTION, SOLUTION INTRAMUSCULAR; INTRAVENOUS EVERY 2 HOUR PRN
Status: DISCONTINUED | OUTPATIENT
Start: 2022-03-24 | End: 2022-03-26

## 2022-03-24 RX ORDER — ACETAMINOPHEN 325 MG/1
650 TABLET ORAL EVERY 4 HOURS PRN
Status: DISCONTINUED | OUTPATIENT
Start: 2022-03-24 | End: 2022-03-26

## 2022-03-24 RX ADMIN — HYDROCODONE BITARTRATE AND ACETAMINOPHEN 2 TABLET: 5; 325 TABLET ORAL at 09:35:00

## 2022-03-24 NOTE — IMAGING NOTE
Pt scheduled for myelogram with Dr. Alvino Chin. Primary language is Sinhala. Understands English to give instructions for activity level and increase water intake. Nephew is at bedside. Report called to [place] in [facility].

## 2022-03-24 NOTE — PROCEDURES
[facility]:  [facility]    1965 MRN [id number]   [facility] CT Attending DAMI Rich   Hosp Day # 0 PCP Israel Mccarthy MD         Brief Procedure Report    Pre-Operative Diagnosis: Neck Pain and upper extremity radiculopathy especially right shoulder and middle finger    Post-Operative Diagnosis: Same as above. Procedure Performed: Cervical Myelogram    Anesthesia: 1% lidocaine    EBL: 0    Complications: None    Summary of Case: 14cc of Isovue M 300 injected into subarachnoid space at L2-3. Full report to follow in PACS after post myelo CT.     Adria Carcamo

## 2022-03-24 NOTE — PROCEDURES
[facility]  Pre-Procedure Note    Name: Doyle Lamas  MRN#: [id number]  : 1965    Procedure:  Cervical Myelogram    Indication: Neck pain. Upper extremity weakness and right upper extremity radiculopathy    Allergies:      Penicillin G            OTHER (SEE COMMENTS)    Comment:Per pt, when he took penicillin before , his red             blood cells became low. Pertinent Medications:    Is patient on any Aspirin, Coumadin, or any other Anticoagulations/Antiplatelet medications? no      Mental Status:  Alert and Oriented      Health Status: Acceptable for Procedure    Impression and Plans:    Neck pain and right upper extremity radiculopathy. S/p cervical disc prosthesis. For cervical myelogram.     I have reviewed the above information prior to procedure. I have discussed the risks and benefits and alternatives with the patient. The patient understands and agrees to proceed with plan of care.     oRbert Ware MD

## 2022-03-28 ENCOUNTER — ANESTHESIA EVENT (OUTPATIENT)
Dept: EMERGENCY DEPT | Facility: HOSPITAL | Age: 57
End: 2022-03-28
Payer: COMMERCIAL

## 2022-03-28 ENCOUNTER — ANESTHESIA (OUTPATIENT)
Dept: EMERGENCY DEPT | Facility: HOSPITAL | Age: 57
End: 2022-03-28
Payer: COMMERCIAL

## 2022-03-28 ENCOUNTER — HOSPITAL ENCOUNTER (EMERGENCY)
Facility: HOSPITAL | Age: 57
Discharge: HOME | End: 2022-03-28
Attending: EMERGENCY MEDICINE
Payer: COMMERCIAL

## 2022-03-28 VITALS
BODY MASS INDEX: 27.64 KG/M2 | OXYGEN SATURATION: 98 % | SYSTOLIC BLOOD PRESSURE: 138 MMHG | DIASTOLIC BLOOD PRESSURE: 85 MMHG | RESPIRATION RATE: 18 BRPM | WEIGHT: 172 LBS | TEMPERATURE: 98 F | HEIGHT: 66 IN | HEART RATE: 95 BPM

## 2022-03-28 DIAGNOSIS — G97.1 SPINAL HEADACHE: Primary | ICD-10-CM

## 2022-03-28 PROCEDURE — 96375 TX/PRO/DX INJ NEW DRUG ADDON: CPT

## 2022-03-28 PROCEDURE — 96374 THER/PROPH/DIAG INJ IV PUSH: CPT

## 2022-03-28 PROCEDURE — 99284 EMERGENCY DEPT VISIT MOD MDM: CPT

## 2022-03-28 PROCEDURE — 99285 EMERGENCY DEPT VISIT HI MDM: CPT

## 2022-03-28 PROCEDURE — 62273 INJECT EPIDURAL PATCH: CPT | Performed by: ANESTHESIOLOGY

## 2022-03-28 PROCEDURE — 62273 INJECT EPIDURAL PATCH: CPT

## 2022-03-28 PROCEDURE — 96361 HYDRATE IV INFUSION ADD-ON: CPT

## 2022-03-28 RX ORDER — ONDANSETRON 2 MG/ML
4 INJECTION INTRAMUSCULAR; INTRAVENOUS ONCE
Status: COMPLETED | OUTPATIENT
Start: 2022-03-28 | End: 2022-03-28

## 2022-03-28 RX ORDER — HYDROMORPHONE HYDROCHLORIDE 1 MG/ML
0.5 INJECTION, SOLUTION INTRAMUSCULAR; INTRAVENOUS; SUBCUTANEOUS EVERY 30 MIN PRN
Status: DISCONTINUED | OUTPATIENT
Start: 2022-03-28 | End: 2022-03-28

## 2022-03-28 NOTE — ED QUICK NOTES
ED Charge RN informed this RN that Anesthesia is finishing a procedure and then will be down to see the patient

## 2022-03-28 NOTE — ED QUICK NOTES
2 hours post blood patch complete. Patient sat up in bed, denies any HA, neck, or shoulder pain at this time. Patient states he is just having pain at the injection site. Patient states he feels okay to go home. Dr. Antony Yanes notified.

## 2022-03-28 NOTE — ED INITIAL ASSESSMENT (HPI)
Neck pain and pain at the back of the head since 4 days.  Patient had recent shoulder surgery on January and myelogram yesterday

## 2022-03-28 NOTE — ED QUICK NOTES
Procedure complete. Patient is to remain flat for 2 hours per MD order. Patient and daughter verbalized understanding.

## 2022-03-28 NOTE — ANESTHESIA PROCEDURE NOTES
Blood Patch  Performed by: Arpit Hines MD  Authorized by: Arpit Hines MD     General Information and Staff    Start Time:   Anesthesiologist: Arpit Hines MD  Performed by:   Anesthesiologist  Patient Location:  ED  Site Identification: surface landmarks    Preanesthetic Checklist: 2 patient identifiers, IV checked, site marked, risks and benefits discussed, surgical consent, monitors and equipment checked, pre-op evaluation, timeout performed, anesthesia consent and sterile technique used      Procedure Details    Location of Venous Blood Draw:  Arm  Volume of Blood Injected:  20 mL  Patient Position:  Sitting  Prep: Chloraprep    Monitoring:  EKG, continuous pulse oximetry and blood pressure monitoring  Approach:  Midline  Location:  L3-4  Injection Technique:  JAGDEEP air    Needle and Epidural Catheter Details    Injection Method:  Touhy needle  Needle Gauge:  17  Needle Length (cm):  8.5  Loss of Resistance:  5    Assessment    Events: well tolerated      Medications       Additional Comments Verified patient with two patient identifiers. Spoke with patient regarding refill for Isosorbide. Per patient she will continue to see  at her new practice. Refill forward to .

## 2022-03-29 ENCOUNTER — TELEPHONE (OUTPATIENT)
Dept: FAMILY MEDICINE CLINIC | Facility: CLINIC | Age: 57
End: 2022-03-29

## 2022-03-29 ENCOUNTER — OFFICE VISIT (OUTPATIENT)
Dept: PHYSICAL THERAPY | Age: 57
End: 2022-03-29
Attending: ORTHOPAEDIC SURGERY
Payer: COMMERCIAL

## 2022-03-29 PROCEDURE — 97110 THERAPEUTIC EXERCISES: CPT

## 2022-03-29 PROCEDURE — 97140 MANUAL THERAPY 1/> REGIONS: CPT

## 2022-03-29 NOTE — TELEPHONE ENCOUNTER
Patient was in ER yesterday. He says the ER doctor told him to see Dr Juan Waddell sooner than the April 14 appointment. There isn't anything available sooner. Please call and advise. Patient is ok with waiting, he is just following ER doctors orders.

## 2022-03-30 NOTE — TELEPHONE ENCOUNTER
Language Line used for the following conversation   #[id number]    Patient advised of Doctor's note below. Patient verbalized understanding.     Future Appointments   Date Time Provider Agnes Rocha   4/4/2022 10:00 AM Elías Maya MD [facility]

## 2022-03-31 ENCOUNTER — TELEPHONE (OUTPATIENT)
Dept: NEUROLOGY | Facility: CLINIC | Age: 57
End: 2022-03-31

## 2022-03-31 NOTE — TELEPHONE ENCOUNTER
Rec'd medical records request from [employer], dated on 3/31/22; original sent to scan scat; copy sent to scanning

## 2022-04-01 ENCOUNTER — OFFICE VISIT (OUTPATIENT)
Dept: PHYSICAL THERAPY | Age: 57
End: 2022-04-01
Attending: ORTHOPAEDIC SURGERY
Payer: COMMERCIAL

## 2022-04-01 PROCEDURE — 97140 MANUAL THERAPY 1/> REGIONS: CPT

## 2022-04-01 PROCEDURE — 97110 THERAPEUTIC EXERCISES: CPT

## 2022-04-04 ENCOUNTER — OFFICE VISIT (OUTPATIENT)
Dept: FAMILY MEDICINE CLINIC | Facility: CLINIC | Age: 57
End: 2022-04-04
Payer: COMMERCIAL

## 2022-04-04 VITALS
WEIGHT: 173 LBS | RESPIRATION RATE: 18 BRPM | TEMPERATURE: 98 F | BODY MASS INDEX: 28 KG/M2 | DIASTOLIC BLOOD PRESSURE: 88 MMHG | OXYGEN SATURATION: 95 % | HEART RATE: 90 BPM | SYSTOLIC BLOOD PRESSURE: 132 MMHG

## 2022-04-04 DIAGNOSIS — G97.1 SPINAL HEADACHE: ICD-10-CM

## 2022-04-04 DIAGNOSIS — K59.03 DRUG-INDUCED CONSTIPATION: ICD-10-CM

## 2022-04-04 DIAGNOSIS — M54.12 CERVICAL RADICULOPATHY: Primary | ICD-10-CM

## 2022-04-04 DIAGNOSIS — F41.9 ANXIETY: ICD-10-CM

## 2022-04-04 DIAGNOSIS — Z09 FOLLOW-UP EXAM: ICD-10-CM

## 2022-04-04 PROCEDURE — 99214 OFFICE O/P EST MOD 30 MIN: CPT | Performed by: FAMILY MEDICINE

## 2022-04-04 PROCEDURE — 3075F SYST BP GE 130 - 139MM HG: CPT | Performed by: FAMILY MEDICINE

## 2022-04-04 PROCEDURE — 3079F DIAST BP 80-89 MM HG: CPT | Performed by: FAMILY MEDICINE

## 2022-04-04 RX ORDER — HYDROCODONE BITARTRATE AND ACETAMINOPHEN 5; 325 MG/1; MG/1
1 TABLET ORAL EVERY 6 HOURS PRN
Qty: 42 TABLET | Refills: 0 | Status: SHIPPED | OUTPATIENT
Start: 2022-04-04 | End: 2022-05-04

## 2022-04-04 RX ORDER — ALPRAZOLAM 0.25 MG/1
0.25 TABLET ORAL NIGHTLY PRN
Qty: 15 TABLET | Refills: 0 | Status: SHIPPED | OUTPATIENT
Start: 2022-04-04 | End: 2022-04-19

## 2022-04-04 RX ORDER — DOCUSATE SODIUM 100 MG/1
100 CAPSULE, LIQUID FILLED ORAL 2 TIMES DAILY
Qty: 60 CAPSULE | Refills: 0 | Status: SHIPPED | OUTPATIENT
Start: 2022-04-04 | End: 2022-05-04

## 2022-04-04 RX ORDER — ALPRAZOLAM 0.25 MG/1
0.25 TABLET ORAL NIGHTLY PRN
COMMUNITY

## 2022-04-05 ENCOUNTER — OFFICE VISIT (OUTPATIENT)
Dept: PHYSICAL THERAPY | Age: 57
End: 2022-04-05
Attending: ORTHOPAEDIC SURGERY
Payer: COMMERCIAL

## 2022-04-05 DIAGNOSIS — M75.51 ACUTE BURSITIS OF RIGHT SHOULDER: ICD-10-CM

## 2022-04-05 DIAGNOSIS — S43.431A DEGENERATIVE SUPERIOR LABRAL ANTERIOR-TO-POSTERIOR (SLAP) TEAR OF RIGHT SHOULDER: ICD-10-CM

## 2022-04-05 PROCEDURE — 97110 THERAPEUTIC EXERCISES: CPT

## 2022-04-05 PROCEDURE — 97140 MANUAL THERAPY 1/> REGIONS: CPT

## 2022-04-08 ENCOUNTER — OFFICE VISIT (OUTPATIENT)
Dept: PHYSICAL THERAPY | Age: 57
End: 2022-04-08
Attending: ORTHOPAEDIC SURGERY
Payer: COMMERCIAL

## 2022-04-08 DIAGNOSIS — M75.51 ACUTE BURSITIS OF RIGHT SHOULDER: ICD-10-CM

## 2022-04-08 DIAGNOSIS — S43.431A DEGENERATIVE SUPERIOR LABRAL ANTERIOR-TO-POSTERIOR (SLAP) TEAR OF RIGHT SHOULDER: ICD-10-CM

## 2022-04-08 PROCEDURE — 97110 THERAPEUTIC EXERCISES: CPT

## 2022-04-08 PROCEDURE — 97140 MANUAL THERAPY 1/> REGIONS: CPT

## 2022-04-12 ENCOUNTER — OFFICE VISIT (OUTPATIENT)
Dept: PHYSICAL THERAPY | Age: 57
End: 2022-04-12
Attending: ORTHOPAEDIC SURGERY
Payer: COMMERCIAL

## 2022-04-12 DIAGNOSIS — M75.51 ACUTE BURSITIS OF RIGHT SHOULDER: ICD-10-CM

## 2022-04-12 DIAGNOSIS — S43.431A DEGENERATIVE SUPERIOR LABRAL ANTERIOR-TO-POSTERIOR (SLAP) TEAR OF RIGHT SHOULDER: ICD-10-CM

## 2022-04-12 PROCEDURE — 97110 THERAPEUTIC EXERCISES: CPT

## 2022-04-12 PROCEDURE — 97140 MANUAL THERAPY 1/> REGIONS: CPT

## 2022-04-15 ENCOUNTER — OFFICE VISIT (OUTPATIENT)
Dept: PHYSICAL THERAPY | Age: 57
End: 2022-04-15
Attending: ORTHOPAEDIC SURGERY
Payer: COMMERCIAL

## 2022-04-15 DIAGNOSIS — S43.431A DEGENERATIVE SUPERIOR LABRAL ANTERIOR-TO-POSTERIOR (SLAP) TEAR OF RIGHT SHOULDER: ICD-10-CM

## 2022-04-15 DIAGNOSIS — M75.51 ACUTE BURSITIS OF RIGHT SHOULDER: ICD-10-CM

## 2022-04-15 PROCEDURE — 97110 THERAPEUTIC EXERCISES: CPT

## 2022-04-15 PROCEDURE — 97140 MANUAL THERAPY 1/> REGIONS: CPT

## 2022-04-19 ENCOUNTER — OFFICE VISIT (OUTPATIENT)
Dept: PHYSICAL THERAPY | Age: 57
End: 2022-04-19
Attending: ORTHOPAEDIC SURGERY
Payer: COMMERCIAL

## 2022-04-19 DIAGNOSIS — S43.431A DEGENERATIVE SUPERIOR LABRAL ANTERIOR-TO-POSTERIOR (SLAP) TEAR OF RIGHT SHOULDER: ICD-10-CM

## 2022-04-19 DIAGNOSIS — M75.51 ACUTE BURSITIS OF RIGHT SHOULDER: ICD-10-CM

## 2022-04-19 PROCEDURE — 97110 THERAPEUTIC EXERCISES: CPT

## 2022-04-19 PROCEDURE — 97140 MANUAL THERAPY 1/> REGIONS: CPT

## 2022-04-21 ENCOUNTER — OFFICE VISIT (OUTPATIENT)
Dept: SURGERY | Facility: CLINIC | Age: 57
End: 2022-04-21
Payer: COMMERCIAL

## 2022-04-21 VITALS
BODY MASS INDEX: 28 KG/M2 | SYSTOLIC BLOOD PRESSURE: 120 MMHG | DIASTOLIC BLOOD PRESSURE: 80 MMHG | OXYGEN SATURATION: 97 % | WEIGHT: 173 LBS | HEART RATE: 84 BPM

## 2022-04-21 DIAGNOSIS — G89.29 CHRONIC RIGHT SHOULDER PAIN: ICD-10-CM

## 2022-04-21 DIAGNOSIS — Z98.890 S/P CERVICAL DISC REPLACEMENT: ICD-10-CM

## 2022-04-21 DIAGNOSIS — M54.12 CERVICAL RADICULOPATHY: ICD-10-CM

## 2022-04-21 DIAGNOSIS — M25.511 CHRONIC RIGHT SHOULDER PAIN: ICD-10-CM

## 2022-04-21 DIAGNOSIS — M54.2 NECK PAIN: ICD-10-CM

## 2022-04-21 DIAGNOSIS — M47.22 CERVICAL SPONDYLOSIS WITH RADICULOPATHY: Primary | ICD-10-CM

## 2022-04-21 PROCEDURE — 99214 OFFICE O/P EST MOD 30 MIN: CPT | Performed by: NEUROLOGICAL SURGERY

## 2022-04-21 PROCEDURE — 3074F SYST BP LT 130 MM HG: CPT | Performed by: NEUROLOGICAL SURGERY

## 2022-04-21 PROCEDURE — 3079F DIAST BP 80-89 MM HG: CPT | Performed by: NEUROLOGICAL SURGERY

## 2022-04-21 RX ORDER — CYCLOBENZAPRINE HCL 10 MG
10 TABLET ORAL 3 TIMES DAILY PRN
COMMUNITY

## 2022-04-21 NOTE — PROGRESS NOTES
Neurological Surgery Outpatient Clinic    Eri Bernardo  4/21/2022    Diagnosis: Cervical spondylosis with right arm radiculopathy. C6-7 cervical disc arthroplasty, Dr. Geovani Lopez, 3/14/2019    Chief complaint: Right shoulder pain greater than posterior cervical pain with radiation to the right C7 distribution. Interval History: He has had shoulder surgery with Dr. Shoshana Gallego and continues in therapy. He has a great deal of pain in the shoulder. He has had some anxiety attacks since his shoulder surgery. In regard to his cervical spine he has pain at the cervical thoracic junction radiating down the back of the arm to the index finger. Therapy has helped this slightly. He had his disc arthroplasty in 2019. He had some injections with Dr. Brittanie García 1 year ago which helped the neck slightly. He recently had a cervical myelogram ordered by Marino Crum PA-C, neurosurgery. Interval Examination: He is in a great deal of pain with any movement of the right shoulder. Its impossible to fully test his muscle strength proximally in the right arm due to the shoulder pain. He has 5/5 strength distally in the right hand. He has 5/5 strength proximal and distal in the left arm and bilaterally in the legs. He has 2+ reflexes at the biceps triceps patellar and ankle locations with the left arm being tested only. Imaging: I personally reviewed the cervical myelogram and postmyelogram CT scan.  3/24/2022 CERVICAL CT MYELOGRAM: He has straightening of the cervical spine. On the sagittal images there is a moderate bulge at C5-6 with very slight contouring of the ventral aspect of the cord. There is a C6-7 disc arthroplasty device and some bone forming behind the device.   On the axial images there are some spondylitic changes at multiple discs with mild central osteophytes but no cord contact at C3-4, C4-5, and a diffuse osteophyte with a suggestion of very slight ventral cord flattening left greater than right at C5-6.  At C6-7 there is an arthroplasty device with no significant central or foraminal stenosis. C7-T1 is wide open. There is the slightest of anterior subluxation of C7 on T1 on the sagittal CT images. Labs: No labs this visit. Assessment: Ongoing shoulder greater than neck pain. Recent shoulder surgery. Still in therapy. Previous help from cervical injections. Plan: Consult with , pain management for his cervical area. Ongoing treatment for his right shoulder with Dr. Idania Mueller. In 2 months reevaluation of his cervical spine and transition of care to Dr. Edgar Acosta, neurosurgery. Total face to face time was 25 minutes, more than 50% of the time was spent in counseling and/or coordination of care related to work with  patient and his daughter for full discussion of plan for both shoulder and neck. Off work note till August 1 prepared for the patient with reevaluation in July. Fran Posey.  Deisy Ku, [address] [facility]  Neurological Surgery    Co-Director  [facility]  [address]

## 2022-04-21 NOTE — PROGRESS NOTES
Patient presents in office today with reported pain in neck radiating into bilateral shoulder. Pain level reported 8/10. He is doing physical therapy twice weekly.

## 2022-04-22 ENCOUNTER — OFFICE VISIT (OUTPATIENT)
Dept: PHYSICAL THERAPY | Age: 57
End: 2022-04-22
Attending: ORTHOPAEDIC SURGERY
Payer: COMMERCIAL

## 2022-04-22 DIAGNOSIS — S43.431A DEGENERATIVE SUPERIOR LABRAL ANTERIOR-TO-POSTERIOR (SLAP) TEAR OF RIGHT SHOULDER: ICD-10-CM

## 2022-04-22 DIAGNOSIS — M75.51 ACUTE BURSITIS OF RIGHT SHOULDER: ICD-10-CM

## 2022-04-22 PROCEDURE — 97140 MANUAL THERAPY 1/> REGIONS: CPT

## 2022-04-22 PROCEDURE — 97110 THERAPEUTIC EXERCISES: CPT

## 2022-04-26 ENCOUNTER — OFFICE VISIT (OUTPATIENT)
Dept: PHYSICAL THERAPY | Age: 57
End: 2022-04-26
Attending: ORTHOPAEDIC SURGERY
Payer: COMMERCIAL

## 2022-04-26 DIAGNOSIS — S43.431A DEGENERATIVE SUPERIOR LABRAL ANTERIOR-TO-POSTERIOR (SLAP) TEAR OF RIGHT SHOULDER: ICD-10-CM

## 2022-04-26 DIAGNOSIS — M75.51 ACUTE BURSITIS OF RIGHT SHOULDER: ICD-10-CM

## 2022-04-26 PROCEDURE — 97110 THERAPEUTIC EXERCISES: CPT

## 2022-04-26 PROCEDURE — 97140 MANUAL THERAPY 1/> REGIONS: CPT

## 2022-04-29 ENCOUNTER — OFFICE VISIT (OUTPATIENT)
Dept: PHYSICAL THERAPY | Age: 57
End: 2022-04-29
Attending: ORTHOPAEDIC SURGERY
Payer: COMMERCIAL

## 2022-04-29 DIAGNOSIS — M75.51 ACUTE BURSITIS OF RIGHT SHOULDER: ICD-10-CM

## 2022-04-29 DIAGNOSIS — S43.431A DEGENERATIVE SUPERIOR LABRAL ANTERIOR-TO-POSTERIOR (SLAP) TEAR OF RIGHT SHOULDER: ICD-10-CM

## 2022-04-29 PROCEDURE — 97110 THERAPEUTIC EXERCISES: CPT

## 2022-04-29 PROCEDURE — 97140 MANUAL THERAPY 1/> REGIONS: CPT

## 2022-05-02 ENCOUNTER — APPOINTMENT (OUTPATIENT)
Dept: PHYSICAL THERAPY | Age: 57
End: 2022-05-02
Attending: ORTHOPAEDIC SURGERY
Payer: COMMERCIAL

## 2022-05-17 ENCOUNTER — OFFICE VISIT (OUTPATIENT)
Dept: PHYSICAL THERAPY | Age: 57
End: 2022-05-17
Attending: ORTHOPAEDIC SURGERY
Payer: COMMERCIAL

## 2022-05-17 PROCEDURE — 97140 MANUAL THERAPY 1/> REGIONS: CPT

## 2022-05-17 PROCEDURE — 97110 THERAPEUTIC EXERCISES: CPT

## 2022-05-27 ENCOUNTER — OFFICE VISIT (OUTPATIENT)
Dept: PHYSICAL THERAPY | Age: 57
End: 2022-05-27
Attending: ORTHOPAEDIC SURGERY
Payer: COMMERCIAL

## 2022-05-27 PROCEDURE — 97110 THERAPEUTIC EXERCISES: CPT

## 2022-05-27 PROCEDURE — 97140 MANUAL THERAPY 1/> REGIONS: CPT

## 2022-05-31 ENCOUNTER — ORDER TRANSCRIPTION (OUTPATIENT)
Dept: PHYSICAL THERAPY | Facility: HOSPITAL | Age: 57
End: 2022-05-31

## 2022-05-31 DIAGNOSIS — M24.111 DEGENERATIVE TEAR OF GLENOID LABRUM OF RIGHT SHOULDER: Primary | ICD-10-CM

## 2022-05-31 DIAGNOSIS — M19.011 ARTHRITIS OF RIGHT GLENOHUMERAL JOINT: ICD-10-CM

## 2022-05-31 DIAGNOSIS — M75.51 ACUTE BURSITIS OF RIGHT SHOULDER: ICD-10-CM

## 2022-06-03 ENCOUNTER — OFFICE VISIT (OUTPATIENT)
Dept: PHYSICAL THERAPY | Age: 57
End: 2022-06-03
Attending: ORTHOPAEDIC SURGERY
Payer: COMMERCIAL

## 2022-06-03 PROCEDURE — 97110 THERAPEUTIC EXERCISES: CPT

## 2022-06-03 PROCEDURE — 97140 MANUAL THERAPY 1/> REGIONS: CPT

## 2022-06-07 ENCOUNTER — OFFICE VISIT (OUTPATIENT)
Dept: PHYSICAL THERAPY | Age: 57
End: 2022-06-07
Attending: ORTHOPAEDIC SURGERY
Payer: COMMERCIAL

## 2022-06-07 PROCEDURE — 97110 THERAPEUTIC EXERCISES: CPT

## 2022-06-07 PROCEDURE — 97140 MANUAL THERAPY 1/> REGIONS: CPT

## 2022-06-13 ENCOUNTER — OFFICE VISIT (OUTPATIENT)
Dept: PHYSICAL THERAPY | Age: 57
End: 2022-06-13
Attending: ORTHOPAEDIC SURGERY
Payer: COMMERCIAL

## 2022-06-13 DIAGNOSIS — M19.011 ARTHRITIS OF RIGHT GLENOHUMERAL JOINT: ICD-10-CM

## 2022-06-13 DIAGNOSIS — M75.51 ACUTE BURSITIS OF RIGHT SHOULDER: ICD-10-CM

## 2022-06-13 DIAGNOSIS — M24.111 DEGENERATIVE TEAR OF GLENOID LABRUM OF RIGHT SHOULDER: ICD-10-CM

## 2022-06-13 PROCEDURE — 97110 THERAPEUTIC EXERCISES: CPT

## 2022-06-13 PROCEDURE — 97140 MANUAL THERAPY 1/> REGIONS: CPT

## 2022-06-17 ENCOUNTER — OFFICE VISIT (OUTPATIENT)
Dept: PHYSICAL THERAPY | Age: 57
End: 2022-06-17
Attending: ORTHOPAEDIC SURGERY
Payer: COMMERCIAL

## 2022-06-17 PROCEDURE — 97110 THERAPEUTIC EXERCISES: CPT

## 2022-06-17 PROCEDURE — 97140 MANUAL THERAPY 1/> REGIONS: CPT

## 2022-06-20 ENCOUNTER — OFFICE VISIT (OUTPATIENT)
Dept: PHYSICAL THERAPY | Age: 57
End: 2022-06-20
Attending: ORTHOPAEDIC SURGERY
Payer: COMMERCIAL

## 2022-06-20 PROCEDURE — 97140 MANUAL THERAPY 1/> REGIONS: CPT

## 2022-06-20 PROCEDURE — 97110 THERAPEUTIC EXERCISES: CPT

## 2022-06-21 ENCOUNTER — OFFICE VISIT (OUTPATIENT)
Dept: SURGERY | Facility: CLINIC | Age: 57
End: 2022-06-21
Payer: COMMERCIAL

## 2022-06-21 VITALS — DIASTOLIC BLOOD PRESSURE: 80 MMHG | HEART RATE: 80 BPM | SYSTOLIC BLOOD PRESSURE: 110 MMHG

## 2022-06-21 DIAGNOSIS — M54.2 NECK PAIN: Primary | ICD-10-CM

## 2022-06-21 PROCEDURE — 99214 OFFICE O/P EST MOD 30 MIN: CPT | Performed by: NEUROLOGICAL SURGERY

## 2022-06-21 PROCEDURE — 3079F DIAST BP 80-89 MM HG: CPT | Performed by: NEUROLOGICAL SURGERY

## 2022-06-21 PROCEDURE — 3074F SYST BP LT 130 MM HG: CPT | Performed by: NEUROLOGICAL SURGERY

## 2022-06-21 NOTE — PROGRESS NOTES
Neurosurgery Clinic Visit  2022    Mabel Blackwell PCP:  Anne-Marie Tate MD    1965 MRN [id number]     HISTORY OF PRESENT ILLNESS:  Mabel Blackwell is a(n) 49year old male who presents today for neurosurgical evaluation. This is actually the first time I have seen him. He has been evaluated by Dr. Taz Newell on several prior occasions. His daughter is present for the interview, and serves as  for portions of it. He reports that he is here for neck, shoulder, and C7 pain. He underwent a C6-7 disc arthroplasty in 2019. He reports some benefit from this. He went back to work in 2019, and remained until 2021. He has been out of work since then. He has been diagnosed with some shoulder pathology, and underwent surgery earlier this year. He reports some ongoing discomfort after this. Today, he complains primarily of pain as described above. He is doing physical therapy, without benefit. Symptoms are worse at night, and when moving his arm. He gets some relief in the daytime when he is up, and with heat. He is taking Norco and Flexeril. 2019 disc arthroplasty was performed with the result of the work injury. He reports that this Worker's Compensation claim was closed. He says there is no new complaint. He applied for SSI disability, and this application is currently pending. PHYSICAL EXAMINATION:  Vital Signs:  /80   Pulse 80   On examination, the patient does not have any clear, objective neurological deficit. However, the examination is challenging due to functional overlay. He has significant pain limitation in the right upper extremity as well as the right lower extremity. He does have some tenderness to palpation over the right shoulder, but I was not able to elicit this when the patient was distracted. No hyperreflexia or long tract signs. REVIEW OF STUDIES:    CT myelogram was performed in March.   This demonstrates a broad-based disc osteophyte complex at C5-6. This causes left greater than right lateral recess stenosis. There is no significant nerve root or spinal cord compression. ASSESSMENT and PLAN:  1. Right-sided neck, shoulder, and arm pain. The patient has a history of shoulder injury. He may have an ongoing component of cervical radiculopathy, but I do not think this is the dominant contributor. Furthermore, there are multiple confounding issues here. I have recommended he follow-up with pain management. I do not think there is any indication for surgical intervention. Pain psychology referral may be a reasonable consideration. I have also encouraged him to follow-up with his orthopedic surgeon as scheduled. All questions were answered. Patient voices understanding, and agreement. Time spent on counseling/coordination of care:  30 Minutes    Total time spent with patient:  30 Minutes    APOLLO Brown MD  Medical Director, Neurological Surgery    6/21/2022  3:24 PM       This report was dictated using voice recognition technology. Errors in syntax or recognition may occur, and should not be construed to change the meaning of a sentence.

## 2022-06-22 ENCOUNTER — OFFICE VISIT (OUTPATIENT)
Dept: FAMILY MEDICINE CLINIC | Facility: CLINIC | Age: 57
End: 2022-06-22
Payer: COMMERCIAL

## 2022-06-22 VITALS
SYSTOLIC BLOOD PRESSURE: 136 MMHG | WEIGHT: 173 LBS | DIASTOLIC BLOOD PRESSURE: 86 MMHG | BODY MASS INDEX: 28 KG/M2 | TEMPERATURE: 97 F | HEART RATE: 93 BPM | OXYGEN SATURATION: 96 % | RESPIRATION RATE: 18 BRPM

## 2022-06-22 DIAGNOSIS — F32.A ANXIETY AND DEPRESSION: ICD-10-CM

## 2022-06-22 DIAGNOSIS — Z76.0 MEDICATION REFILL: Primary | ICD-10-CM

## 2022-06-22 DIAGNOSIS — Z02.79 MEDICAL CERTIFICATE ISSUANCE: ICD-10-CM

## 2022-06-22 DIAGNOSIS — M54.12 CERVICAL RADICULOPATHY: ICD-10-CM

## 2022-06-22 DIAGNOSIS — F41.9 ANXIETY AND DEPRESSION: ICD-10-CM

## 2022-06-22 DIAGNOSIS — M75.41 SUBACROMIAL IMPINGEMENT OF RIGHT SHOULDER: ICD-10-CM

## 2022-06-22 RX ORDER — HYDROCODONE BITARTRATE AND ACETAMINOPHEN 5; 325 MG/1; MG/1
1 TABLET ORAL EVERY 6 HOURS PRN
Qty: 42 TABLET | Refills: 0 | Status: SHIPPED | OUTPATIENT
Start: 2022-06-22 | End: 2022-07-22

## 2022-06-22 RX ORDER — ALPRAZOLAM 0.25 MG/1
0.25 TABLET ORAL NIGHTLY PRN
Qty: 15 TABLET | Refills: 0 | Status: SHIPPED | OUTPATIENT
Start: 2022-06-22 | End: 2022-07-07

## 2022-06-22 RX ORDER — DOCUSATE SODIUM 100 MG/1
100 CAPSULE, LIQUID FILLED ORAL 2 TIMES DAILY
Qty: 180 CAPSULE | Refills: 0 | Status: SHIPPED | OUTPATIENT
Start: 2022-06-22 | End: 2022-09-20

## 2022-06-22 RX ORDER — DOXEPIN HYDROCHLORIDE 25 MG/1
25 CAPSULE ORAL NIGHTLY
Qty: 90 CAPSULE | Refills: 0 | Status: SHIPPED | OUTPATIENT
Start: 2022-06-22

## 2022-06-23 ENCOUNTER — TELEPHONE (OUTPATIENT)
Dept: PHYSICAL THERAPY | Facility: HOSPITAL | Age: 57
End: 2022-06-23

## 2022-06-23 ENCOUNTER — OFFICE VISIT (OUTPATIENT)
Dept: PHYSICAL THERAPY | Age: 57
End: 2022-06-23
Attending: ORTHOPAEDIC SURGERY
Payer: COMMERCIAL

## 2022-06-23 PROCEDURE — 97140 MANUAL THERAPY 1/> REGIONS: CPT

## 2022-06-23 PROCEDURE — 97110 THERAPEUTIC EXERCISES: CPT

## 2022-06-24 ENCOUNTER — APPOINTMENT (OUTPATIENT)
Dept: PHYSICAL THERAPY | Age: 57
End: 2022-06-24
Attending: ORTHOPAEDIC SURGERY
Payer: COMMERCIAL

## 2022-06-27 ENCOUNTER — TELEPHONE (OUTPATIENT)
Dept: SURGERY | Facility: CLINIC | Age: 57
End: 2022-06-27

## 2022-06-27 ENCOUNTER — MED REC SCAN ONLY (OUTPATIENT)
Dept: FAMILY MEDICINE CLINIC | Facility: CLINIC | Age: 57
End: 2022-06-27

## 2022-06-27 ENCOUNTER — TELEPHONE (OUTPATIENT)
Dept: FAMILY MEDICINE CLINIC | Facility: CLINIC | Age: 57
End: 2022-06-27

## 2022-06-27 NOTE — TELEPHONE ENCOUNTER
Recd request for medical records from 5/1/2022 to present. [employer] included in request. Faxed office visit notes from 6/21/2022 to [phone]. Fax confirmation rec'd. Sent to scanning.

## 2022-06-27 NOTE — TELEPHONE ENCOUNTER
Medical Record Request to [employer]   Disability Benefits    DOS March 1, 2022 to present

## 2022-06-28 ENCOUNTER — APPOINTMENT (OUTPATIENT)
Dept: PHYSICAL THERAPY | Age: 57
End: 2022-06-28
Attending: ORTHOPAEDIC SURGERY
Payer: COMMERCIAL

## 2022-07-01 ENCOUNTER — APPOINTMENT (OUTPATIENT)
Dept: PHYSICAL THERAPY | Age: 57
End: 2022-07-01
Attending: ORTHOPAEDIC SURGERY
Payer: COMMERCIAL

## 2022-07-05 ENCOUNTER — APPOINTMENT (OUTPATIENT)
Dept: PHYSICAL THERAPY | Age: 57
End: 2022-07-05
Attending: ORTHOPAEDIC SURGERY
Payer: COMMERCIAL

## 2022-07-08 ENCOUNTER — APPOINTMENT (OUTPATIENT)
Dept: PHYSICAL THERAPY | Age: 57
End: 2022-07-08
Attending: ORTHOPAEDIC SURGERY
Payer: COMMERCIAL

## 2022-07-12 ENCOUNTER — APPOINTMENT (OUTPATIENT)
Dept: PHYSICAL THERAPY | Age: 57
End: 2022-07-12
Attending: ORTHOPAEDIC SURGERY
Payer: COMMERCIAL

## 2022-07-19 ENCOUNTER — APPOINTMENT (OUTPATIENT)
Dept: PHYSICAL THERAPY | Age: 57
End: 2022-07-19
Attending: ORTHOPAEDIC SURGERY
Payer: COMMERCIAL

## 2022-07-22 ENCOUNTER — APPOINTMENT (OUTPATIENT)
Dept: PHYSICAL THERAPY | Age: 57
End: 2022-07-22
Attending: ORTHOPAEDIC SURGERY
Payer: COMMERCIAL

## 2022-07-26 ENCOUNTER — APPOINTMENT (OUTPATIENT)
Dept: PHYSICAL THERAPY | Age: 57
End: 2022-07-26
Attending: ORTHOPAEDIC SURGERY
Payer: COMMERCIAL

## 2022-07-29 ENCOUNTER — OFFICE VISIT (OUTPATIENT)
Dept: PAIN CLINIC | Facility: CLINIC | Age: 57
End: 2022-07-29
Payer: COMMERCIAL

## 2022-07-29 ENCOUNTER — APPOINTMENT (OUTPATIENT)
Dept: PHYSICAL THERAPY | Age: 57
End: 2022-07-29
Attending: ORTHOPAEDIC SURGERY
Payer: COMMERCIAL

## 2022-07-29 ENCOUNTER — TELEPHONE (OUTPATIENT)
Dept: NEUROLOGY | Facility: CLINIC | Age: 57
End: 2022-07-29

## 2022-07-29 VITALS — DIASTOLIC BLOOD PRESSURE: 80 MMHG | HEART RATE: 95 BPM | SYSTOLIC BLOOD PRESSURE: 142 MMHG | OXYGEN SATURATION: 96 %

## 2022-07-29 DIAGNOSIS — Z98.890 S/P CERVICAL DISC REPLACEMENT: Primary | ICD-10-CM

## 2022-07-29 DIAGNOSIS — S43.431A DEGENERATIVE SUPERIOR LABRAL ANTERIOR-TO-POSTERIOR (SLAP) TEAR OF RIGHT SHOULDER: ICD-10-CM

## 2022-07-29 PROCEDURE — 3077F SYST BP >= 140 MM HG: CPT | Performed by: ANESTHESIOLOGY

## 2022-07-29 PROCEDURE — 99215 OFFICE O/P EST HI 40 MIN: CPT | Performed by: ANESTHESIOLOGY

## 2022-07-29 PROCEDURE — 3079F DIAST BP 80-89 MM HG: CPT | Performed by: ANESTHESIOLOGY

## 2022-07-29 RX ORDER — HYDROCODONE BITARTRATE AND ACETAMINOPHEN 10; 325 MG/1; MG/1
1 TABLET ORAL EVERY 6 HOURS PRN
COMMUNITY

## 2022-07-29 NOTE — TELEPHONE ENCOUNTER
Pt came into the office with daughter, to request that his note for being off from work be extended due to his chronic pain. pt states he just doesn't want to have anymore accidents and would like to request if doctor could extend him being off from work through 11/1/22 instead of what the current letter states for 8/1/22.

## 2022-07-29 NOTE — TELEPHONE ENCOUNTER
I will loop in Dr. Harjeet Dumont and get his input. I have not seen this patient's. Dr. Juan Barriga gave him his off work note which expires Monday. If no surgery is anticipated then I think it is unlikely we can write him a letter to be off. Dr Harjeet Dumont, your thoughts?

## 2022-07-29 NOTE — PROGRESS NOTES
Patient presents in office today with reported pain in neck radiating down right arm. Strong cramping in shoulder.  Taking hydrocodone for pain from PCP     Current pain level reported =8-9 /10    Last reported dose of n/a      Narcotic Contract renewal none    Urine Drug screen none

## 2022-08-01 NOTE — TELEPHONE ENCOUNTER
Tiffanie from the Law office is calling to state pt has a deadline of 8/2/22 to have the work status note sent in. Kavitha Gandara states once the letter is done could we fax over to her office at [phone].

## 2022-08-01 NOTE — TELEPHONE ENCOUNTER
Pt's daughter calling requesting letter for pt to be off work,  Please advise if he is able to go to work or if a letter may be written because he is still in significant pain.

## 2022-08-03 ENCOUNTER — OFFICE VISIT (OUTPATIENT)
Dept: FAMILY MEDICINE CLINIC | Facility: CLINIC | Age: 57
End: 2022-08-03
Payer: COMMERCIAL

## 2022-08-03 VITALS
BODY MASS INDEX: 26.26 KG/M2 | RESPIRATION RATE: 18 BRPM | WEIGHT: 163.38 LBS | DIASTOLIC BLOOD PRESSURE: 88 MMHG | OXYGEN SATURATION: 96 % | HEART RATE: 92 BPM | HEIGHT: 66 IN | TEMPERATURE: 98 F | SYSTOLIC BLOOD PRESSURE: 132 MMHG

## 2022-08-03 DIAGNOSIS — Z00.00 ANNUAL PHYSICAL EXAM: Primary | ICD-10-CM

## 2022-08-03 DIAGNOSIS — Z13.1 SCREENING FOR DIABETES MELLITUS: ICD-10-CM

## 2022-08-03 DIAGNOSIS — Z86.010 PERSONAL HISTORY OF COLONIC POLYPS: ICD-10-CM

## 2022-08-03 DIAGNOSIS — Z80.0 FAMILY HISTORY OF STOMACH CANCER: ICD-10-CM

## 2022-08-03 DIAGNOSIS — Z76.0 MEDICATION REFILL: ICD-10-CM

## 2022-08-03 DIAGNOSIS — F41.9 ANXIETY: ICD-10-CM

## 2022-08-03 DIAGNOSIS — Z13.29 SCREENING FOR THYROID DISORDER: ICD-10-CM

## 2022-08-03 DIAGNOSIS — E78.1 HYPERTRIGLYCERIDEMIA: ICD-10-CM

## 2022-08-03 DIAGNOSIS — Z98.890 HISTORY OF EAR SURGERY: ICD-10-CM

## 2022-08-03 DIAGNOSIS — F43.20 ANTICIPATORY GRIEF: ICD-10-CM

## 2022-08-03 PROCEDURE — 3079F DIAST BP 80-89 MM HG: CPT | Performed by: FAMILY MEDICINE

## 2022-08-03 PROCEDURE — 99396 PREV VISIT EST AGE 40-64: CPT | Performed by: FAMILY MEDICINE

## 2022-08-03 PROCEDURE — 3008F BODY MASS INDEX DOCD: CPT | Performed by: FAMILY MEDICINE

## 2022-08-03 PROCEDURE — 3075F SYST BP GE 130 - 139MM HG: CPT | Performed by: FAMILY MEDICINE

## 2022-08-03 RX ORDER — ALPRAZOLAM 0.25 MG/1
0.25 TABLET ORAL NIGHTLY PRN
Qty: 30 TABLET | Refills: 0 | Status: SHIPPED | OUTPATIENT
Start: 2022-08-03 | End: 2022-09-02

## 2022-08-10 ENCOUNTER — TELEPHONE (OUTPATIENT)
Dept: FAMILY MEDICINE CLINIC | Facility: CLINIC | Age: 57
End: 2022-08-10

## 2022-08-10 NOTE — TELEPHONE ENCOUNTER
Received fax from [facility] regarding Patrick Hoffmann -    Dr. Dipesh Goodwin reviewed and signed    pt's EGD and colonoscopy - dated 12/13/2017  Recommend Repeat colonoscopy in 3-5 years    Care gaps updated  Next colonoscopy due Dec 2022    Send to scanning

## 2022-09-06 ENCOUNTER — OFFICE VISIT (OUTPATIENT)
Facility: LOCATION | Age: 57
End: 2022-09-06
Payer: COMMERCIAL

## 2022-09-06 DIAGNOSIS — Z12.11 ENCOUNTER FOR SCREENING COLONOSCOPY: Primary | ICD-10-CM

## 2022-09-06 PROCEDURE — S0285 CNSLT BEFORE SCREEN COLONOSC: HCPCS | Performed by: SURGERY

## 2022-09-07 ENCOUNTER — TELEPHONE (OUTPATIENT)
Dept: FAMILY MEDICINE CLINIC | Facility: CLINIC | Age: 57
End: 2022-09-07

## 2022-09-07 NOTE — TELEPHONE ENCOUNTER
Received fax from [facility] of [employer] regarding request Sara  work status note before 09/20/2022. \"    \"Dr. Jeremiah Dan at [facility] did not want to provide it for him after their facility (which you recommended my client to treat at) gave my client 3 months off work. Dr. Juliette Hernandez, his surgeon, also didn't want to provide a work status note because he moved his practice to [facility]. I was told by Dr. Krys Mills office to ask you, as my client's PCP, to write him a new note.   This is an urgent matter because my client will be terminated and stop receiving benefits if he isn't given this note before 09/20/2022\"      LOV 08/03/2022 with Dr. Juan Waddell for annual physical    Please advise, thank you

## 2022-09-16 ENCOUNTER — DOCUMENTATION ONLY (OUTPATIENT)
Dept: FAMILY MEDICINE CLINIC | Facility: CLINIC | Age: 57
End: 2022-09-16

## 2022-09-16 NOTE — TELEPHONE ENCOUNTER
Note/letter faxed back to [facility] [employer] at fax [address] [facility] [employer] at [place] - notified of note above - staff v/u - advised to call office back if letter not received - staff v/u  No further questions at this time    Copy send to scanning

## 2022-09-19 ENCOUNTER — TELEPHONE (OUTPATIENT)
Dept: PAIN CLINIC | Facility: CLINIC | Age: 57
End: 2022-09-19

## 2022-09-19 NOTE — TELEPHONE ENCOUNTER
Received Medical Records request from Law offices of [employer]. [employer] has been received and sent for processing.

## 2022-09-20 ENCOUNTER — OFFICE VISIT (OUTPATIENT)
Dept: SURGERY | Facility: CLINIC | Age: 57
End: 2022-09-20

## 2022-09-20 VITALS — DIASTOLIC BLOOD PRESSURE: 68 MMHG | HEART RATE: 85 BPM | SYSTOLIC BLOOD PRESSURE: 126 MMHG

## 2022-09-20 DIAGNOSIS — Z98.890 S/P CERVICAL DISC REPLACEMENT: ICD-10-CM

## 2022-09-20 DIAGNOSIS — G89.29 CHRONIC RIGHT SHOULDER PAIN: ICD-10-CM

## 2022-09-20 DIAGNOSIS — M25.511 CHRONIC RIGHT SHOULDER PAIN: ICD-10-CM

## 2022-09-20 DIAGNOSIS — M54.2 NECK PAIN: Primary | ICD-10-CM

## 2022-09-20 PROCEDURE — 99213 OFFICE O/P EST LOW 20 MIN: CPT | Performed by: NEUROLOGICAL SURGERY

## 2022-09-20 PROCEDURE — 3078F DIAST BP <80 MM HG: CPT | Performed by: NEUROLOGICAL SURGERY

## 2022-09-20 PROCEDURE — 3074F SYST BP LT 130 MM HG: CPT | Performed by: NEUROLOGICAL SURGERY

## 2022-10-17 ENCOUNTER — TELEPHONE (OUTPATIENT)
Dept: SURGERY | Facility: CLINIC | Age: 57
End: 2022-10-17

## 2022-10-17 NOTE — TELEPHONE ENCOUNTER
Pt has never been seen by Dr. Kye Narvaez.    Pt seen by Zack Levi 9/20/22    Routed for inquiry regarding request.

## 2022-10-17 NOTE — TELEPHONE ENCOUNTER
Davey Salvador from law of [employer] called asking if  can writing letter of protection since Dr couldn't see him and ref him to Dr Zahra Forte; pt has been scheduled w/ Dr Marbin Gerard but hasn't been seen yet and his work has taken away his benefit; they asked if we can let them know by Nov 1st 2022

## 2022-10-21 NOTE — TELEPHONE ENCOUNTER
Accucheck obtained was >40. Per NNP, will re-check accucheck in 1hour and then make decision about need for umb line. Patient was seen by Herb Verdin and Brionna Carr 6/21/22. TE from Dr Brionna Carr 7/29/22 we are not able to address his work or disability. His PCP should handling this until he sees Dr. Yayo Bustos. I gave him a referral to Yayo Bustos at his last visit.  Dr Yayo Bustos did his surgery

## 2022-10-24 ENCOUNTER — TELEPHONE (OUTPATIENT)
Dept: FAMILY MEDICINE CLINIC | Facility: CLINIC | Age: 57
End: 2022-10-24

## 2022-10-24 NOTE — TELEPHONE ENCOUNTER
Lm Hernandez from [employer] office of [employer] called they are wanting to know if the doctor would be willing to write pt a new letter. She advised that he is unable to get into see Dr. Carlos Li until 11/15 and that he is going to be paying out of pocket. They are covering the pt's work comp. Case and want to ensure he does not lose his job because he can not get in sooner. Lm Hernandez was advised that the dr is out of office until Monday.   She said she would call back middle of next week if she does not hear anything on Monday     Tiffanie can be reached at [phone]

## 2022-10-24 NOTE — TELEPHONE ENCOUNTER
[facility] and provided information below. She acknowledged and was thankful for the call. Nothing further needed for this call.

## 2022-10-27 RX ORDER — HYDROCODONE BITARTRATE AND ACETAMINOPHEN 10; 325 MG/1; MG/1
1 TABLET ORAL EVERY 6 HOURS PRN
Qty: 42 TABLET | Refills: 0 | Status: SHIPPED | OUTPATIENT
Start: 2022-10-27 | End: 2022-11-26

## 2022-10-27 NOTE — TELEPHONE ENCOUNTER
Routing to provider per protocol. HYDROcodone-acetaminophen  MG Oral Tab  Last refilled on ? for #? with ? rf. Last labs 3/24/22. Last seen on 8/3/22. No future appointments. Thank you.

## 2022-10-31 NOTE — TELEPHONE ENCOUNTER
Refer to prior letter and update dates with most recent visits with specialists to provide patient with new letter (pend please) until his visit with Dr Laureen Mesa on 11/15/2022. Thank you.

## 2022-11-04 ENCOUNTER — TELEPHONE (OUTPATIENT)
Dept: FAMILY MEDICINE CLINIC | Facility: CLINIC | Age: 57
End: 2022-11-04

## 2022-11-04 ENCOUNTER — TELEPHONE (OUTPATIENT)
Dept: SURGERY | Facility: CLINIC | Age: 57
End: 2022-11-04

## 2022-11-04 NOTE — TELEPHONE ENCOUNTER
PATIENT HAS A QUESTION REGARDING HIS MEDICATION. HE IS ASKING THE NAME OF THE MEDICATION FROM 2018. PATIENT Armenian SPEAKING BUT TRIED SPEAKING ENGLISH. HE SAID HIS QUESTION WASN'T EXTREMELY IMPORTANT FOR INTERPRETOR AND HE WILL DO HIS BEST TO EXPLAIN.

## 2022-11-04 NOTE — TELEPHONE ENCOUNTER
Rec'd med rec req from release point for dates 07/1/22 to present; sent to scan stat; copy sent to scanning

## 2022-11-04 NOTE — TELEPHONE ENCOUNTER
Left message to voicemail (per verbal release form consent, confirmed with identifying message.)  Advised patient to call office back [phone].

## 2022-11-16 ENCOUNTER — TELEPHONE (OUTPATIENT)
Facility: LOCATION | Age: 57
End: 2022-11-16

## 2022-11-16 DIAGNOSIS — Z12.11 ENCOUNTER FOR SCREENING COLONOSCOPY: Primary | ICD-10-CM

## 2022-11-16 NOTE — TELEPHONE ENCOUNTER
[facility] Termed 9/20/2022    Unable to initiate authorization for Colonoscopy as there is no active insurance on file    Routing to staff to update insurance

## 2022-11-17 DIAGNOSIS — Z12.11 SPECIAL SCREENING FOR MALIGNANT NEOPLASMS, COLON: Primary | ICD-10-CM

## 2022-11-28 ENCOUNTER — OFFICE VISIT (OUTPATIENT)
Dept: FAMILY MEDICINE CLINIC | Facility: CLINIC | Age: 57
End: 2022-11-28

## 2022-11-28 VITALS
TEMPERATURE: 97 F | WEIGHT: 170.81 LBS | SYSTOLIC BLOOD PRESSURE: 124 MMHG | DIASTOLIC BLOOD PRESSURE: 92 MMHG | BODY MASS INDEX: 28 KG/M2 | RESPIRATION RATE: 16 BRPM | OXYGEN SATURATION: 96 % | HEART RATE: 106 BPM

## 2022-11-28 DIAGNOSIS — M75.41 SUBACROMIAL IMPINGEMENT OF RIGHT SHOULDER: ICD-10-CM

## 2022-11-28 DIAGNOSIS — F51.02 ADJUSTMENT INSOMNIA: ICD-10-CM

## 2022-11-28 DIAGNOSIS — Z51.81 MEDICATION MONITORING ENCOUNTER: ICD-10-CM

## 2022-11-28 DIAGNOSIS — M54.12 CERVICAL RADICULOPATHY: ICD-10-CM

## 2022-11-28 DIAGNOSIS — Z23 NEED FOR VACCINATION: ICD-10-CM

## 2022-11-28 DIAGNOSIS — R03.0 ELEVATED BLOOD PRESSURE READING: Primary | ICD-10-CM

## 2022-11-28 PROCEDURE — 99214 OFFICE O/P EST MOD 30 MIN: CPT | Performed by: FAMILY MEDICINE

## 2022-11-28 PROCEDURE — 90686 IIV4 VACC NO PRSV 0.5 ML IM: CPT | Performed by: FAMILY MEDICINE

## 2022-11-28 PROCEDURE — 90471 IMMUNIZATION ADMIN: CPT | Performed by: FAMILY MEDICINE

## 2022-11-28 RX ORDER — DOXEPIN HYDROCHLORIDE 25 MG/1
25 CAPSULE ORAL NIGHTLY
Qty: 90 CAPSULE | Refills: 0 | Status: SHIPPED | OUTPATIENT
Start: 2022-11-28

## 2022-11-28 RX ORDER — CYCLOBENZAPRINE HCL 10 MG
10 TABLET ORAL 2 TIMES DAILY PRN
Qty: 180 TABLET | Refills: 0 | Status: SHIPPED | OUTPATIENT
Start: 2022-11-28 | End: 2023-02-26

## 2022-11-28 RX ORDER — HYDROCODONE BITARTRATE AND ACETAMINOPHEN 10; 325 MG/1; MG/1
1 TABLET ORAL EVERY 8 HOURS PRN
Qty: 42 TABLET | Refills: 0 | Status: SHIPPED | OUTPATIENT
Start: 2022-11-28 | End: 2022-12-28

## 2022-11-28 RX ORDER — ALPRAZOLAM 0.25 MG/1
0.25 TABLET ORAL NIGHTLY PRN
Qty: 30 TABLET | Refills: 0 | Status: SHIPPED | OUTPATIENT
Start: 2022-11-28 | End: 2022-12-28

## 2022-11-29 ENCOUNTER — MED REC SCAN ONLY (OUTPATIENT)
Dept: FAMILY MEDICINE CLINIC | Facility: CLINIC | Age: 57
End: 2022-11-29

## 2023-02-06 NOTE — PROCEDURES
Procedure note: Trigger point injections  Procedure Diagnosis: Myofascial pain    Summary of Procedure:   Risks and benefits of the procedure were discussed with the patient and consent was obtained.  Trigger points were palpated and marked along the bilate none

## 2023-02-27 ENCOUNTER — TELEPHONE (OUTPATIENT)
Dept: FAMILY MEDICINE CLINIC | Facility: CLINIC | Age: 58
End: 2023-02-27

## 2023-02-27 NOTE — TELEPHONE ENCOUNTER
THAI:  Pt stated that he was in [facility] and had his colonoscopy completed there. He will be dropping off his paperwork and film. He will then schedule a follow up appt.

## 2023-04-12 ENCOUNTER — OFFICE VISIT (OUTPATIENT)
Dept: FAMILY MEDICINE CLINIC | Facility: CLINIC | Age: 58
End: 2023-04-12
Payer: COMMERCIAL

## 2023-04-12 ENCOUNTER — TELEPHONE (OUTPATIENT)
Dept: FAMILY MEDICINE CLINIC | Facility: CLINIC | Age: 58
End: 2023-04-12

## 2023-04-12 VITALS
WEIGHT: 170.63 LBS | OXYGEN SATURATION: 98 % | SYSTOLIC BLOOD PRESSURE: 118 MMHG | BODY MASS INDEX: 28 KG/M2 | HEART RATE: 85 BPM | TEMPERATURE: 97 F | DIASTOLIC BLOOD PRESSURE: 80 MMHG

## 2023-04-12 DIAGNOSIS — F51.02 ADJUSTMENT INSOMNIA: ICD-10-CM

## 2023-04-12 DIAGNOSIS — Z76.0 MEDICATION REFILL: ICD-10-CM

## 2023-04-12 DIAGNOSIS — Z51.81 MEDICATION MONITORING ENCOUNTER: Primary | ICD-10-CM

## 2023-04-12 DIAGNOSIS — M54.12 CERVICAL RADICULOPATHY: ICD-10-CM

## 2023-04-12 DIAGNOSIS — M75.41 SUBACROMIAL IMPINGEMENT OF RIGHT SHOULDER: ICD-10-CM

## 2023-04-12 PROCEDURE — 3074F SYST BP LT 130 MM HG: CPT | Performed by: FAMILY MEDICINE

## 2023-04-12 PROCEDURE — 99215 OFFICE O/P EST HI 40 MIN: CPT | Performed by: FAMILY MEDICINE

## 2023-04-12 PROCEDURE — 3079F DIAST BP 80-89 MM HG: CPT | Performed by: FAMILY MEDICINE

## 2023-04-12 RX ORDER — HYDROCODONE BITARTRATE AND ACETAMINOPHEN 10; 325 MG/1; MG/1
1 TABLET ORAL EVERY 6 HOURS PRN
COMMUNITY
End: 2023-04-12

## 2023-04-12 RX ORDER — HYDROCODONE BITARTRATE AND ACETAMINOPHEN 10; 325 MG/1; MG/1
1 TABLET ORAL EVERY 12 HOURS PRN
Qty: 180 TABLET | Refills: 0 | Status: SHIPPED | OUTPATIENT
Start: 2023-04-12 | End: 2023-04-12

## 2023-04-12 RX ORDER — CYCLOBENZAPRINE HCL 10 MG
10 TABLET ORAL NIGHTLY
Qty: 90 TABLET | Refills: 1 | Status: SHIPPED | OUTPATIENT
Start: 2023-04-12 | End: 2023-10-09

## 2023-04-12 RX ORDER — CYCLOBENZAPRINE HCL 10 MG
10 TABLET ORAL 3 TIMES DAILY PRN
COMMUNITY
End: 2023-04-12

## 2023-04-12 RX ORDER — HYDROCODONE BITARTRATE AND ACETAMINOPHEN 10; 325 MG/1; MG/1
1 TABLET ORAL EVERY 12 HOURS PRN
Qty: 60 TABLET | Refills: 0 | Status: SHIPPED | OUTPATIENT
Start: 2023-04-12 | End: 2023-06-11

## 2023-04-12 RX ORDER — ALPRAZOLAM 0.25 MG/1
0.25 TABLET ORAL NIGHTLY PRN
COMMUNITY

## 2023-04-12 RX ORDER — DOXEPIN HYDROCHLORIDE 25 MG/1
25 CAPSULE ORAL NIGHTLY
Qty: 90 CAPSULE | Refills: 0 | Status: SHIPPED | OUTPATIENT
Start: 2023-04-12 | End: 2023-07-11

## 2023-04-12 NOTE — TELEPHONE ENCOUNTER
PHARMACY CALLED AND ADV THAT THEY CAN NOT DO 90 DAY SCRIPT FOR:    HYDROcodone-acetaminophen  MG Oral Tab    PLEASE RESEND SCRIPT AND PUT DIAGNOSIS CODE      THANK YOU     [facility]      THANK YOU

## 2023-04-12 NOTE — TELEPHONE ENCOUNTER
[facility] Moorthie, Mydhili Nurse 1 hour ago (12:59 PM)     Switched and coded. Thank you.        Sent to pharmacy as: HYDROcodone-Acetaminophen  MG Oral Tablet (Norco)    Earliest Fill Date: 4/12/2023    E-Prescribing Status: Receipt confirmed by pharmacy (4/12/2023 10:30 AM CDT)

## 2023-04-13 ENCOUNTER — TELEPHONE (OUTPATIENT)
Dept: FAMILY MEDICINE CLINIC | Facility: CLINIC | Age: 58
End: 2023-04-13

## 2023-04-13 NOTE — TELEPHONE ENCOUNTER
Received fax from [address] and Coverm[employer] regarding PA request for Doxepin HCL 25MG    ePA requested

## 2023-04-13 NOTE — TELEPHONE ENCOUNTER
Closed  4/13/2023 10:25 AM  Case ID: [id number] Close reason: Prior Authorization not required for patient/medication   Note from payer: Prior Authorization not required for patient/medication - Prescriber details have been updated to match the prescriber directory.    Payer:  [address] [place] [address] [address]  [phone]

## 2023-04-14 ENCOUNTER — TELEPHONE (OUTPATIENT)
Dept: FAMILY MEDICINE CLINIC | Facility: CLINIC | Age: 58
End: 2023-04-14

## 2023-04-18 ENCOUNTER — TELEPHONE (OUTPATIENT)
Dept: FAMILY MEDICINE CLINIC | Facility: CLINIC | Age: 58
End: 2023-04-18

## 2023-04-18 NOTE — TELEPHONE ENCOUNTER
Spoke with pharmacist Tiffanie Pope who states Nela Furl has not been dispensed since November so is considered a new prescription, not a continuation. Can only dispense 7 day supply per guidelines. Will dispense #14  Also need to know Dx. Advised radiculopathy      Patient notified pharmacy can only dispense #14. If needs refilled call office. Patient verbalized understanding.

## 2023-04-18 NOTE — TELEPHONE ENCOUNTER
Patient states Maldonado told him to contact his pcp regarding his norco refill    Please adv  Thank you

## 2023-04-28 ENCOUNTER — TELEPHONE (OUTPATIENT)
Dept: FAMILY MEDICINE CLINIC | Facility: CLINIC | Age: 58
End: 2023-04-28

## 2023-04-28 NOTE — TELEPHONE ENCOUNTER
Email received from medical records regarding colonoscopy report that is [place] [facility] [place] Not sure why it was addressed to me, so will send to PCP and RN pool. Thanks! I was able to find out that you can email this to our language line area at [email or web address] They will need your cost center and I am told it is quite pricey.   Let me know if you want the document sent back to you or if this suffices

## 2023-05-17 ENCOUNTER — TELEPHONE (OUTPATIENT)
Dept: FAMILY MEDICINE CLINIC | Facility: CLINIC | Age: 58
End: 2023-05-17

## 2023-05-17 NOTE — TELEPHONE ENCOUNTER
Spoke to pt advising form he dropped off could not be filled out by MM has to be filled out by  who manages his pain. Advised form will be at  to .   Verbally understood, will come tomorrow to p/u

## 2023-06-30 ENCOUNTER — TELEPHONE (OUTPATIENT)
Dept: FAMILY MEDICINE CLINIC | Facility: CLINIC | Age: 58
End: 2023-06-30

## 2023-06-30 DIAGNOSIS — K31.7 GASTRIC POLYP: Primary | ICD-10-CM

## 2023-07-24 ENCOUNTER — TELEPHONE (OUTPATIENT)
Dept: PAIN CLINIC | Facility: CLINIC | Age: 58
End: 2023-07-24

## 2023-07-24 ENCOUNTER — TELEPHONE (OUTPATIENT)
Dept: FAMILY MEDICINE CLINIC | Facility: CLINIC | Age: 58
End: 2023-07-24

## 2023-07-24 NOTE — TELEPHONE ENCOUNTER
RECEIVED MEDICAL RECORD REQUEST FROM [facility]. REQUESTING ANY/ALL RECORDS FROM 10/16/20 TO PRESENT.     SENT TO MEDICAL RECORDS

## 2023-08-09 DIAGNOSIS — M75.41 SUBACROMIAL IMPINGEMENT OF RIGHT SHOULDER: ICD-10-CM

## 2023-08-09 DIAGNOSIS — M54.12 CERVICAL RADICULOPATHY: ICD-10-CM

## 2023-08-09 RX ORDER — HYDROCODONE BITARTRATE AND ACETAMINOPHEN 10; 325 MG/1; MG/1
1 TABLET ORAL EVERY 12 HOURS PRN
Qty: 60 TABLET | Refills: 0 | Status: SHIPPED | OUTPATIENT
Start: 2023-08-09 | End: 2023-08-14

## 2023-08-09 NOTE — TELEPHONE ENCOUNTER
Spoke to pt advising refill sent and needing f/u. Scheduled with [facility] 8/14/23 to discuss.   Sent to [facility] as [employer]

## 2023-08-09 NOTE — TELEPHONE ENCOUNTER
LOV 04/12/23  Last refill on 04/12/23, for #180 tabs, with 0 refills  HYDROcodone-acetaminophen  MG Oral Tab     No future appointments. Order(s) pending, please review. Thank you.   Cristhian Briggs

## 2023-08-09 NOTE — TELEPHONE ENCOUNTER
Will need to discuss long term use of Norco and discuss other chronic pain medication. Please inform patient to use the Norco only as needed and not to take this every day.

## 2023-08-10 ENCOUNTER — TELEPHONE (OUTPATIENT)
Dept: FAMILY MEDICINE CLINIC | Facility: CLINIC | Age: 58
End: 2023-08-10

## 2023-08-10 NOTE — TELEPHONE ENCOUNTER
Received fax from [employer]  regarding Prior Authorization Request:  HYDROcodone-acetaminophen  MG Oral Tab     ePA requested    Closed   8/10/2023  1:41 PM  Case ID: [id number] Close reason: Prescription within prescribing limits. Prior Authorization not required.

## 2023-08-14 ENCOUNTER — OFFICE VISIT (OUTPATIENT)
Dept: FAMILY MEDICINE CLINIC | Facility: CLINIC | Age: 58
End: 2023-08-14
Payer: COMMERCIAL

## 2023-08-14 VITALS
SYSTOLIC BLOOD PRESSURE: 126 MMHG | DIASTOLIC BLOOD PRESSURE: 78 MMHG | HEIGHT: 66 IN | BODY MASS INDEX: 26.62 KG/M2 | HEART RATE: 78 BPM | TEMPERATURE: 98 F | OXYGEN SATURATION: 97 % | WEIGHT: 165.63 LBS

## 2023-08-14 DIAGNOSIS — Z76.0 MEDICATION REFILL: ICD-10-CM

## 2023-08-14 DIAGNOSIS — Z79.891 CHRONIC PRESCRIPTION OPIATE USE: ICD-10-CM

## 2023-08-14 DIAGNOSIS — M75.41 SUBACROMIAL IMPINGEMENT OF RIGHT SHOULDER: ICD-10-CM

## 2023-08-14 DIAGNOSIS — M50.90 CERVICAL DISC DISEASE: Primary | ICD-10-CM

## 2023-08-14 DIAGNOSIS — Z98.890 S/P CERVICAL DISC REPLACEMENT: ICD-10-CM

## 2023-08-14 DIAGNOSIS — M54.12 CERVICAL RADICULOPATHY: ICD-10-CM

## 2023-08-14 RX ORDER — HYDROCODONE BITARTRATE AND ACETAMINOPHEN 10; 325 MG/1; MG/1
1 TABLET ORAL DAILY PRN
Qty: 60 TABLET | Refills: 0 | Status: SHIPPED | OUTPATIENT
Start: 2023-08-14

## 2023-08-14 RX ORDER — CYCLOBENZAPRINE HCL 10 MG
10 TABLET ORAL NIGHTLY
Qty: 90 TABLET | Refills: 1 | Status: SHIPPED | OUTPATIENT
Start: 2023-08-14 | End: 2024-02-10

## 2023-08-14 RX ORDER — ALPRAZOLAM 0.25 MG/1
0.25 TABLET ORAL NIGHTLY PRN
Qty: 30 TABLET | Refills: 0 | Status: SHIPPED | OUTPATIENT
Start: 2023-08-14

## 2023-10-08 NOTE — PROGRESS NOTES
HPI:    Patient ID: Belinda Payer is a 48year old male. 53-year-old male presents for follow-up.   Underwent C7-T1 epidural steroid injection under fluoroscopy, IV conscious sedation on 2/7/2020 with 70% relief of symptoms for 2 weeks, and then sympto cervical rotation. Palpation: ttp in the cervical paraspinals bilaterally. Provocative maneuvers: Spurling test to the right + for neck pain. Spurling test to the left + for neck pain.    Neurological:   Strength testing: normal muscle bulk in the delfino Yes [employer]

## 2023-11-13 ENCOUNTER — OFFICE VISIT (OUTPATIENT)
Dept: FAMILY MEDICINE CLINIC | Facility: CLINIC | Age: 58
End: 2023-11-13
Payer: COMMERCIAL

## 2023-11-13 VITALS
BODY MASS INDEX: 27.29 KG/M2 | DIASTOLIC BLOOD PRESSURE: 80 MMHG | WEIGHT: 169.81 LBS | HEART RATE: 89 BPM | OXYGEN SATURATION: 95 % | SYSTOLIC BLOOD PRESSURE: 120 MMHG | TEMPERATURE: 97 F | HEIGHT: 66 IN

## 2023-11-13 DIAGNOSIS — Z13.1 SCREENING FOR DIABETES MELLITUS: ICD-10-CM

## 2023-11-13 DIAGNOSIS — B35.1 ONYCHOMYCOSIS: Primary | ICD-10-CM

## 2023-11-13 DIAGNOSIS — Z76.0 MEDICATION REFILL: ICD-10-CM

## 2023-11-13 DIAGNOSIS — Z79.891 CHRONIC PRESCRIPTION OPIATE USE: ICD-10-CM

## 2023-11-13 DIAGNOSIS — Z13.220 SCREENING, LIPID: ICD-10-CM

## 2023-11-13 DIAGNOSIS — Z13.6 SCREENING, ISCHEMIC HEART DISEASE: ICD-10-CM

## 2023-11-13 DIAGNOSIS — Z12.5 SCREENING PSA (PROSTATE SPECIFIC ANTIGEN): ICD-10-CM

## 2023-11-13 DIAGNOSIS — M54.12 CERVICAL RADICULOPATHY: ICD-10-CM

## 2023-11-13 DIAGNOSIS — M75.41 SUBACROMIAL IMPINGEMENT OF RIGHT SHOULDER: ICD-10-CM

## 2023-11-13 PROCEDURE — 3079F DIAST BP 80-89 MM HG: CPT | Performed by: FAMILY MEDICINE

## 2023-11-13 PROCEDURE — 3008F BODY MASS INDEX DOCD: CPT | Performed by: FAMILY MEDICINE

## 2023-11-13 PROCEDURE — 3074F SYST BP LT 130 MM HG: CPT | Performed by: FAMILY MEDICINE

## 2023-11-13 PROCEDURE — 99214 OFFICE O/P EST MOD 30 MIN: CPT | Performed by: FAMILY MEDICINE

## 2023-11-13 RX ORDER — ALPRAZOLAM 0.25 MG/1
0.25 TABLET ORAL NIGHTLY PRN
Qty: 30 TABLET | Refills: 0 | Status: SHIPPED | OUTPATIENT
Start: 2023-11-13

## 2023-11-13 RX ORDER — HYDROCODONE BITARTRATE AND ACETAMINOPHEN 10; 325 MG/1; MG/1
1 TABLET ORAL DAILY PRN
Qty: 14 TABLET | Refills: 0 | Status: SHIPPED | OUTPATIENT
Start: 2023-11-13

## 2023-11-14 ENCOUNTER — NURSE ONLY (OUTPATIENT)
Dept: FAMILY MEDICINE CLINIC | Facility: CLINIC | Age: 58
End: 2023-11-14
Payer: COMMERCIAL

## 2023-11-14 DIAGNOSIS — Z13.1 SCREENING FOR DIABETES MELLITUS: ICD-10-CM

## 2023-11-14 DIAGNOSIS — B35.1 ONYCHOMYCOSIS: ICD-10-CM

## 2023-11-14 DIAGNOSIS — Z12.5 SCREENING PSA (PROSTATE SPECIFIC ANTIGEN): ICD-10-CM

## 2023-11-14 DIAGNOSIS — Z13.220 SCREENING, LIPID: ICD-10-CM

## 2023-11-14 LAB
ALBUMIN SERPL-MCNC: 4.1 G/DL (ref 3.4–5)
ALBUMIN/GLOB SERPL: 1.3 {RATIO} (ref 1–2)
ALP LIVER SERPL-CCNC: 73 U/L
ALT SERPL-CCNC: 19 U/L
ANION GAP SERPL CALC-SCNC: 5 MMOL/L (ref 0–18)
AST SERPL-CCNC: 10 U/L (ref 15–37)
BASOPHILS # BLD AUTO: 0.06 X10(3) UL (ref 0–0.2)
BASOPHILS NFR BLD AUTO: 0.8 %
BILIRUB SERPL-MCNC: 0.5 MG/DL (ref 0.1–2)
BUN BLD-MCNC: 15 MG/DL (ref 9–23)
CALCIUM BLD-MCNC: 9.5 MG/DL (ref 8.5–10.1)
CHLORIDE SERPL-SCNC: 104 MMOL/L (ref 98–112)
CHOLEST SERPL-MCNC: 216 MG/DL (ref ?–200)
CO2 SERPL-SCNC: 27 MMOL/L (ref 21–32)
COMPLEXED PSA SERPL-MCNC: 1.35 NG/ML (ref ?–4)
CREAT BLD-MCNC: 1.17 MG/DL
EGFRCR SERPLBLD CKD-EPI 2021: 73 ML/MIN/1.73M2 (ref 60–?)
EOSINOPHIL # BLD AUTO: 0.57 X10(3) UL (ref 0–0.7)
EOSINOPHIL NFR BLD AUTO: 7.5 %
ERYTHROCYTE [DISTWIDTH] IN BLOOD BY AUTOMATED COUNT: 12.6 %
EST. AVERAGE GLUCOSE BLD GHB EST-MCNC: 128 MG/DL (ref 68–126)
FASTING PATIENT LIPID ANSWER: YES
FASTING STATUS PATIENT QL REPORTED: YES
GLOBULIN PLAS-MCNC: 3.2 G/DL (ref 2.8–4.4)
GLUCOSE BLD-MCNC: 95 MG/DL (ref 70–99)
HBA1C MFR BLD: 6.1 % (ref ?–5.7)
HCT VFR BLD AUTO: 45.4 %
HDLC SERPL-MCNC: 39 MG/DL (ref 40–59)
HGB BLD-MCNC: 15.4 G/DL
IMM GRANULOCYTES # BLD AUTO: 0.02 X10(3) UL (ref 0–1)
IMM GRANULOCYTES NFR BLD: 0.3 %
LDLC SERPL CALC-MCNC: 131 MG/DL (ref ?–100)
LYMPHOCYTES # BLD AUTO: 2.89 X10(3) UL (ref 1–4)
LYMPHOCYTES NFR BLD AUTO: 37.8 %
MCH RBC QN AUTO: 31.7 PG (ref 26–34)
MCHC RBC AUTO-ENTMCNC: 33.9 G/DL (ref 31–37)
MCV RBC AUTO: 93.4 FL
MONOCYTES # BLD AUTO: 0.57 X10(3) UL (ref 0.1–1)
MONOCYTES NFR BLD AUTO: 7.5 %
NEUTROPHILS # BLD AUTO: 3.54 X10 (3) UL (ref 1.5–7.7)
NEUTROPHILS # BLD AUTO: 3.54 X10(3) UL (ref 1.5–7.7)
NEUTROPHILS NFR BLD AUTO: 46.1 %
NONHDLC SERPL-MCNC: 177 MG/DL (ref ?–130)
OSMOLALITY SERPL CALC.SUM OF ELEC: 283 MOSM/KG (ref 275–295)
PLATELET # BLD AUTO: 221 10(3)UL (ref 150–450)
POTASSIUM SERPL-SCNC: 3.8 MMOL/L (ref 3.5–5.1)
PROT SERPL-MCNC: 7.3 G/DL (ref 6.4–8.2)
RBC # BLD AUTO: 4.86 X10(6)UL
SODIUM SERPL-SCNC: 136 MMOL/L (ref 136–145)
TRIGL SERPL-MCNC: 258 MG/DL (ref 30–149)
VLDLC SERPL CALC-MCNC: 47 MG/DL (ref 0–30)
WBC # BLD AUTO: 7.7 X10(3) UL (ref 4–11)

## 2023-11-14 PROCEDURE — 80061 LIPID PANEL: CPT | Performed by: FAMILY MEDICINE

## 2023-11-14 PROCEDURE — 85025 COMPLETE CBC W/AUTO DIFF WBC: CPT | Performed by: FAMILY MEDICINE

## 2023-11-14 PROCEDURE — 83036 HEMOGLOBIN GLYCOSYLATED A1C: CPT | Performed by: FAMILY MEDICINE

## 2023-11-14 PROCEDURE — 80053 COMPREHEN METABOLIC PANEL: CPT | Performed by: FAMILY MEDICINE

## 2023-11-14 NOTE — PROGRESS NOTES
Patient to clinic for labs per Dr Jaqui Benites and lavender tube drawn left AC x 1 attempt    Patient left office in stable condition

## 2023-11-17 ENCOUNTER — TELEPHONE (OUTPATIENT)
Dept: FAMILY MEDICINE CLINIC | Facility: CLINIC | Age: 58
End: 2023-11-17

## 2023-11-17 DIAGNOSIS — Z51.81 MEDICATION MONITORING ENCOUNTER: ICD-10-CM

## 2023-11-17 DIAGNOSIS — R73.09 ELEVATED HEMOGLOBIN A1C: ICD-10-CM

## 2023-11-17 DIAGNOSIS — B35.1 ONYCHOMYCOSIS: Primary | ICD-10-CM

## 2023-11-17 NOTE — TELEPHONE ENCOUNTER
PATIENT HAD HIS SON/DAUGHTER TRANSLATE FOR HIM. PATIENT RECEIVED AN EMAIL REQUESTING HIM TO MAKE A FOLLOW UP APPOINTMENT TO DISCUSS LAB RESULTS. IS THIS CORRECT OR CAN RESULTS BE GIVEN OVER THE PHONE?

## 2023-11-17 NOTE — TELEPHONE ENCOUNTER
Please let patient know that the results of their recent testing shows some slight abnormalities and that I would like to discuss the results with them in person. Please have patient make an appointment at their convenience.   Thank you   Written by Ashley Barragan MD on 11/14/2023  4:08 PM CST  Seen by patient Ina Crandall on 11/14/2023  5:36 PM      Please assist pt scheduling follow-up appt with Dr. Jordy Rai  Thank you

## 2023-11-17 NOTE — TELEPHONE ENCOUNTER
A1c is higher but stable. Remining labs unremarkable. Pt to follow up in 3 months. Thank you   Written by Itzel Rodriguez MD on 11/15/2023  7:44 AM CST      Language Line used for the following conversation   #[id number]    Advised patient of Dr. Rafia Horan note below/above. Patient verbalized understanding. Pt asking about Rx for toe fungus? Pt states lab work done to see if pt able to get Rx for toe fungus?   LOV 11/13/23 w/ Dr. Brandon Briceno for Onychomycosis    Advised pt that Dr. Brandon Briceno not in office now, will have covering provider address if needed - he v/u    Please advise, thank you    Dianna Diana      Future Appointments   Date Time Provider Agnes Rocha   2/19/2024  8:00 AM Tu Cross MD [facility] ASHLEY Quinn

## 2023-11-20 RX ORDER — TERBINAFINE HYDROCHLORIDE 250 MG/1
250 TABLET ORAL DAILY
Qty: 30 TABLET | Refills: 0 | Status: SHIPPED | OUTPATIENT
Start: 2023-11-20

## 2023-11-20 NOTE — TELEPHONE ENCOUNTER
Kelli White MD   to Me    11/20/23  9:16 AM  CMP monthly for the next three months and yes to the A1c      Language Line used for the following conversation   #[id number]    Left detailed message to voicemail (per verbal release form consent with confirmed identifying message) of Dr. Brenton Haq note below and above. Patient was advised to call office back with any questions/concerns.       Recall placed for repeat CMP in 1 month, repeat A1C in 3 months

## 2023-11-27 ENCOUNTER — TELEPHONE (OUTPATIENT)
Dept: FAMILY MEDICINE CLINIC | Facility: CLINIC | Age: 58
End: 2023-11-27

## 2023-11-27 NOTE — TELEPHONE ENCOUNTER
PT CALLED AND ADV THAT HE THOUGHT THAT DR WAS GOING TO CALL IN A TOPICAL OINTMENT CREAM FOR TOE FUNGUS.     PLEASE CALL AND ADV    [facility]    THANK YOU

## 2023-11-27 NOTE — TELEPHONE ENCOUNTER
Called and spoke with patient who requested RN speak with daughter and hand her the phone. Offered to call back with  but daughter states she is able to interpret   Patient daughter notified terbinafine pill was sent to pharmacy for the toe nail fungus. Patient asking if ointment is needed as well. Advised topical not necessary. Advised will need to repeat cmp to check liver function 1 month after starting medication  Verbalized understanding.

## 2023-12-20 ENCOUNTER — OFFICE VISIT (OUTPATIENT)
Dept: FAMILY MEDICINE CLINIC | Facility: CLINIC | Age: 58
End: 2023-12-20
Payer: COMMERCIAL

## 2023-12-20 VITALS
OXYGEN SATURATION: 96 % | HEART RATE: 80 BPM | WEIGHT: 167.63 LBS | BODY MASS INDEX: 27 KG/M2 | DIASTOLIC BLOOD PRESSURE: 70 MMHG | SYSTOLIC BLOOD PRESSURE: 110 MMHG | TEMPERATURE: 97 F

## 2023-12-20 DIAGNOSIS — Z13.6 SCREENING, ISCHEMIC HEART DISEASE: ICD-10-CM

## 2023-12-20 DIAGNOSIS — F41.9 ANXIETY: ICD-10-CM

## 2023-12-20 DIAGNOSIS — Z00.00 WELL ADULT EXAM: Primary | ICD-10-CM

## 2023-12-20 DIAGNOSIS — M54.2 NECK PAIN: ICD-10-CM

## 2023-12-20 DIAGNOSIS — Z23 FLU VACCINE NEED: ICD-10-CM

## 2023-12-20 DIAGNOSIS — E78.2 MIXED HYPERLIPIDEMIA: ICD-10-CM

## 2023-12-20 DIAGNOSIS — R73.03 PREDIABETES: ICD-10-CM

## 2023-12-20 PROCEDURE — 3078F DIAST BP <80 MM HG: CPT | Performed by: FAMILY MEDICINE

## 2023-12-20 PROCEDURE — 3074F SYST BP LT 130 MM HG: CPT | Performed by: FAMILY MEDICINE

## 2023-12-20 PROCEDURE — 99396 PREV VISIT EST AGE 40-64: CPT | Performed by: FAMILY MEDICINE

## 2023-12-20 PROCEDURE — 90471 IMMUNIZATION ADMIN: CPT | Performed by: FAMILY MEDICINE

## 2023-12-20 PROCEDURE — 99213 OFFICE O/P EST LOW 20 MIN: CPT | Performed by: FAMILY MEDICINE

## 2023-12-20 PROCEDURE — 90686 IIV4 VACC NO PRSV 0.5 ML IM: CPT | Performed by: FAMILY MEDICINE

## 2023-12-20 RX ORDER — ESCITALOPRAM OXALATE 5 MG/1
5 TABLET ORAL DAILY
Qty: 30 TABLET | Refills: 0 | Status: SHIPPED | OUTPATIENT
Start: 2023-12-20

## 2023-12-20 RX ORDER — ATORVASTATIN CALCIUM 10 MG/1
10 TABLET, FILM COATED ORAL DAILY
Qty: 90 TABLET | Refills: 0 | Status: SHIPPED | OUTPATIENT
Start: 2023-12-20 | End: 2024-12-14

## 2023-12-20 RX ORDER — ACETAMINOPHEN AND CODEINE PHOSPHATE 300; 30 MG/1; MG/1
1 TABLET ORAL 2 TIMES DAILY PRN
Qty: 60 TABLET | Refills: 0 | Status: SHIPPED | OUTPATIENT
Start: 2023-12-20 | End: 2023-12-20

## 2023-12-20 RX ORDER — ACETAMINOPHEN AND CODEINE PHOSPHATE 300; 30 MG/1; MG/1
1 TABLET ORAL 2 TIMES DAILY PRN
Qty: 60 TABLET | Refills: 0 | Status: SHIPPED | OUTPATIENT
Start: 2023-12-20

## 2023-12-20 RX ORDER — ACETAMINOPHEN AND CODEINE PHOSPHATE 300; 30 MG/1; MG/1
1 TABLET ORAL EVERY 12 HOURS
Qty: 60 TABLET | Refills: 0 | Status: SHIPPED | OUTPATIENT
Start: 2023-12-20 | End: 2023-12-20

## 2023-12-20 NOTE — TELEPHONE ENCOUNTER
PATIENT CALLING. HE SAYS THAT [facility] PHARMACY INFORMED HIM THAT PCP NEEDS TO CALL THEM.

## 2023-12-20 NOTE — TELEPHONE ENCOUNTER
Sent to pharmacy as: Acetaminophen-Codeine 300-30 MG Oral Tablet    E-Prescribing Status: Receipt confirmed by pharmacy (12/20/2023  4:53 PM CST)

## 2023-12-20 NOTE — TELEPHONE ENCOUNTER
Received fax from [facility] regarding Rx- need for clarification: acetaminophen-codeine (TYLENOL WITH CODEINE #3)   \"Notes to prescriber: call pharmacy for additional documentation for chronic opioid therapy\"      [address] [facility]#[phone] - pharmacist wanted to clarify    Last dispensed Rx norco - for #7 tabs - on 11/16/23     Pharmacist wanting to clarify -  Rx Acetaminophen-Codeine 300-30 MG Oral Tablet   Direction: take 1 tab every 12 hours  Quantity #60 tabs    Or change to take once daily as needed? Since Rx norco was lasting pt for some time?     Please advise, thank you

## 2023-12-27 ENCOUNTER — TELEPHONE (OUTPATIENT)
Dept: FAMILY MEDICINE CLINIC | Facility: CLINIC | Age: 58
End: 2023-12-27

## 2023-12-27 NOTE — TELEPHONE ENCOUNTER
[address] [facility]#[phone]  Advised of note below - she v/u, no further questions at this time  Rx acetaminophen-codeine ([address] #3) 300-30 MG Oral Tab, to dispense #30 tabs, 0 refills  Take 1 tab by mouth BID as needed    Pt notified of note above - he v/u, no further questions at this time

## 2023-12-27 NOTE — TELEPHONE ENCOUNTER
ePA requested  Closed   12/27/2023 10:52 AM  Case ID: [id number] Close reason: Prescription within prescribing limits. Prior Authorization not required. Note from payer: No PA required, Prescription is within prescribing limits.

## 2023-12-27 NOTE — TELEPHONE ENCOUNTER
PT CALLED AND ADV IS HAVING A PROBLEM GETTING 1 MEDICATION    acetaminophen-codeine (TYLENOL WITH CODEINE #3) 300-30 MG Oral Tab     PT ADV THAT MEDICATION NOT READY - DOES THIS NEED A PRIOR AUTH?     PLEASE ADV AND LET PT AWARE    THANK YOU    JUAN F CORONA

## 2023-12-27 NOTE — TELEPHONE ENCOUNTER
[address] [facility]#[phone] -   Pharmacist wanting to clarify Rx [address] #3  Pharmacist states that pt's previous Rx for norco - pt had total 14 tabs from August to November  Wondering if ok to update Rx to quantity of 30 tabs instead of 60 tabs?  Would like to see how pt does with smaller quantity and if pt needing medication BID as needed, can send for greater quantity  12/20/23 Rx sent for #60 tabs, 0 refills    Pharmacist requesting call back instead of sending new Rx    Please advise, thank you

## 2024-01-09 ENCOUNTER — HOSPITAL ENCOUNTER (OUTPATIENT)
Dept: CT IMAGING | Age: 59
Discharge: HOME | End: 2024-01-09
Attending: FAMILY MEDICINE

## 2024-01-09 VITALS
HEIGHT: 66 IN | BODY MASS INDEX: 26.84 KG/M2 | DIASTOLIC BLOOD PRESSURE: 70 MMHG | WEIGHT: 167 LBS | SYSTOLIC BLOOD PRESSURE: 110 MMHG

## 2024-01-09 DIAGNOSIS — Z13.6 SCREENING, ISCHEMIC HEART DISEASE: ICD-10-CM

## 2024-01-09 NOTE — PROGRESS NOTES
Date of Service 1/9/2024    AGUSTINA ESPARZA  Date of Birth 6/23/1965    Patient Age: 58 year old    PCP: Flora Fang MD  [address] [facility] [address]   used     Heart Scan Consult  Preliminary Heart Scan Score: 69.1    Previous Screening  Heart Scan Completed Previously: Yes  Year of last heart scan: 2020  Score of last heart scan: 58.92  Peripheral Vascular Scan Completed Previously: No          Risk Factors  Personal Risk Factors  Non-alterable Risk Factors: Personal History;Age;Family History  Alterable Risk Factors: Abnormal Cholesterol;Pre-Diabetes;Lack of exercise      Body Mass Index  Body mass index is 26.95 kg/m².    Blood Pressure     /70     (Normal =< 120/80,  Elevated = 120-129/ >80,  High Stage1 130-139/80-89 , Stage2 >140/>90)    Lipid Profile  Patient was in fasting state: Yes    Cholesterol: 216, done on 11/14/2023.  HDL Cholesterol: 39, done on 11/14/2023.  LDL Cholesterol: 131, done on 11/14/2023.  TriGlycerides 258, done on 11/14/2023.    Cholesterol Goals  Value   Total  =< 200   HDL  = > 45 Men = > 55 Women   LDL   =< 100   Triglycerides  =< 150       Glucose and Hemoglobin A1C  Lab Results   Component Value Date    GLU 95 11/14/2023    A1C 6.1 (H) 11/14/2023     (Normal Fasting Glucose < 100mg/dl )    Nurse Review  Risk factor information and results reviewed with Nurse: Yes    Recommended Follow Up:  Consult your physician regarding:: Final Heart Scan Report;Discuss potential for Incidental Finding      Recommendations for Change:  Nutrition Changes: Low Saturated Fat;Low Fat Dairy;Low Salt Eating;Increase Fiber    Cholesterol Modification (goal of therapy depends upon your risk): Increase HDL (Healthy/Good) Normal >45 Men >55 Women;Decrease LDL (Lousy/Bad) Ideal <100;Decrease Triglycerides (Ugly) Normal <150;Decrease Total Normal <200 (started cholesterol medication)    Exercise: Initiate Program         Weight Management: Decrease Current  Weight    Stress Management: Adopt Stress Management Techniques    Repeat Heart Scan: 3 Years if Calcium Score is > 0.0;Discuss with your Physician              [place] Recommended Resources:  Recommended Resources: PV Screening  Recommended PV Screening: Abdomen;Carotids         Tiffanie VANG, RN        Please Contact the Nurse Heart Line with any Questions or Concerns [phone].

## 2024-01-12 ENCOUNTER — TELEPHONE (OUTPATIENT)
Dept: FAMILY MEDICINE CLINIC | Facility: CLINIC | Age: 59
End: 2024-01-12

## 2024-01-12 NOTE — TELEPHONE ENCOUNTER
----- Message from Flora Fang MD sent at 1/12/2024  9:01 AM CST -----  Please have pt follow up via video visit to explain recent imaging. Thank you

## 2024-01-12 NOTE — TELEPHONE ENCOUNTER
Patient notified and verbalized understanding. Interpreting services provided by [facility] #[id number]  Future Appointments   Date Time Provider Department Center   1/15/2024  2:40 PM Flora Fang MD [facility] [place]   3/20/2024  8:00 AM Flora Fang MD [facility] [place]

## 2024-01-15 ENCOUNTER — OFFICE VISIT (OUTPATIENT)
Dept: FAMILY MEDICINE CLINIC | Facility: CLINIC | Age: 59
End: 2024-01-15
Payer: COMMERCIAL

## 2024-01-15 VITALS
DIASTOLIC BLOOD PRESSURE: 80 MMHG | OXYGEN SATURATION: 96 % | HEART RATE: 87 BPM | RESPIRATION RATE: 18 BRPM | TEMPERATURE: 97 F | WEIGHT: 169.38 LBS | SYSTOLIC BLOOD PRESSURE: 128 MMHG | BODY MASS INDEX: 27 KG/M2

## 2024-01-15 DIAGNOSIS — I25.10 CORONARY ARTERY DISEASE INVOLVING NATIVE CORONARY ARTERY OF NATIVE HEART WITHOUT ANGINA PECTORIS: Primary | ICD-10-CM

## 2024-01-15 PROCEDURE — 99213 OFFICE O/P EST LOW 20 MIN: CPT | Performed by: FAMILY MEDICINE

## 2024-01-15 PROCEDURE — 3074F SYST BP LT 130 MM HG: CPT | Performed by: FAMILY MEDICINE

## 2024-01-15 PROCEDURE — 3079F DIAST BP 80-89 MM HG: CPT | Performed by: FAMILY MEDICINE

## 2024-01-15 NOTE — PROGRESS NOTES
Jeffery Solorzano is a 58 year old male.    Chief Complaint   Patient presents with    Follow - Up       HPI:   Patient is here for follow up of heart scan. He was informed that he has some CAD and it is important for him to undertake healthy diet and exercise and he agrees. He is back on statin therapy and will repeat lipid panel next month along with A1c. He has no other concerns at this time    Patient Active Problem List   Diagnosis    Family history of early CAD    Cervical radiculopathy    S/P cervical disc replacement    Testicular cyst    Atherosclerosis    Hypertriglyceridemia    Acute bursitis of right shoulder    Degenerative superior labral anterior-to-posterior (SLAP) tear of right shoulder    Arthritis of right glenohumeral joint    Right shoulder pain, unspecified chronicity    Shoulder weakness    Chronic prescription opiate use    Cervical disc disease     Current Outpatient Medications   Medication Sig Dispense Refill    atorvastatin (LIPITOR) 10 MG Oral Tab Take 1 tablet (10 mg total) by mouth daily. 90 tablet 0    metFORMIN 500 MG Oral Tab Take 1 tablet (500 mg total) by mouth daily with breakfast. 90 tablet 0    escitalopram (LEXAPRO) 5 MG Oral Tab Take 1 tablet (5 mg total) by mouth daily. 30 tablet 0    acetaminophen-codeine (TYLENOL WITH CODEINE #3) 300-30 MG Oral Tab Take 1 tablet by mouth 2 (two) times daily as needed for Pain. 60 tablet 0    cyclobenzaprine 10 MG Oral Tab Take 1 tablet (10 mg total) by mouth nightly. 90 tablet 1      Past Medical History:   Diagnosis Date    Anesthesia complication     Back problem     PONV (postoperative nausea and vomiting)     Visual impairment     glasses      Social History:  Social History     Socioeconomic History    Marital status:    Tobacco Use    Smoking status: Never    Smokeless tobacco: Never   Vaping Use    Vaping Use: Never used   Substance and Sexual Activity    Alcohol use: Not Currently     Alcohol/week: 0.0 standard drinks of  alcohol     Comment: occasional (rare at a party)    Drug use: No   Other Topics Concern    Caffeine Concern No     Comment: 1 cup a day    Exercise No   Social History Narrative    The patient does not use an assistive device..      The patient does live in a home with stairs.     Family History   Problem Relation Age of Onset    Heart Disorder Father     Heart Disorder Mother     Heart Disorder Brother         CABG        Allergies  Allergies   Allergen Reactions    Penicillin G OTHER (SEE COMMENTS)     Per pt, when he took penicillin before , his red blood cells became low.       REVIEW OF SYSTEMS:   As per HPI all other systems are negative    EXAM:   /80 (BP Location: Right arm, Patient Position: Sitting, Cuff Size: adult)   Pulse 87   Temp 96.6 °F (35.9 °C) (Temporal)   Resp 18   Wt 169 lb 6 oz (76.8 kg)   SpO2 96%   BMI 27.34 kg/m²   GENERAL: well developed, well nourished,in no apparent distress  SKIN: no rashes,no suspicious lesions  HEENT: atraumatic, normocephalic,ears and throat are clear  NECK: supple,no adenopathy, normal thyroid, no nodules  LUNGS: normal rate without respiratory distress, lungs clear to auscultation  CARDIO: RRR without murmur, no edema  GI: normal bowel sounds, no masses, no hepatosplenomegaly, or tenderness  MUSCULOSKELETAL: no edema, normal strength and tone  NEURO:no gross notable deficiencies no notable sensory deficits  PSYCH: alert and oriented x 3 well-groomed, good eye contact, bright affect.    ASSESSMENT AND PLAN:     Encounter Diagnosis   Name Primary?    Coronary artery disease involving native coronary artery of native heart without angina pectoris Yes       No orders of the defined types were placed in this encounter.      Meds & Refills for this Visit:  Requested Prescriptions      No prescriptions requested or ordered in this encounter       Imaging & Consults:  None    No follow-ups on file.  There are no Patient Instructions on file for this visit.

## 2024-02-01 ENCOUNTER — TELEPHONE (OUTPATIENT)
Dept: FAMILY MEDICINE CLINIC | Facility: CLINIC | Age: 59
End: 2024-02-01

## 2024-02-01 NOTE — TELEPHONE ENCOUNTER
Received paperwork from pt's dtr regarding \"[employer]: Life insurance company: Attending Physician's Statement of Disability Important Note\"    LOV 01/15/24 for follow-up, CAD  Last well adult 12/20/23    Schedule in office appt to complete paperwork?  Please advise, thank you

## 2024-02-01 NOTE — TELEPHONE ENCOUNTER
Flora Fang MD Jeganmohan, Janandana Nurse6 minutes ago (4:21 PM)     Yes please     Left detailed message to voicemail (per verbal release form consent with confirmed identifying message) of Dr. Jamison's note above, note below. Patient's dtr was advised to call office back - needs to schedule in office appt with Dr. Jamison

## 2024-02-13 NOTE — TELEPHONE ENCOUNTER
Future Appointments   Date Time Provider Department Center   3/20/2024  8:00 AM Flora Fang MD [facility] [place]

## 2024-03-05 ENCOUNTER — TELEPHONE (OUTPATIENT)
Dept: FAMILY MEDICINE CLINIC | Facility: CLINIC | Age: 59
End: 2024-03-05

## 2024-03-05 NOTE — TELEPHONE ENCOUNTER
Letter mailed to patient reminding him he is due for labs per pt reminder.    Lab Frequency Next Occurrence   Hemoglobin A1C [E] Once 02/20/2024   Comp Metabolic Panel (14) [E] monthly      Emiliana Busby RN P Jeganmohan, Janandana Nurse  Repeat A1C in 3 months

## 2024-03-12 ENCOUNTER — OFFICE VISIT (OUTPATIENT)
Dept: FAMILY MEDICINE CLINIC | Facility: CLINIC | Age: 59
End: 2024-03-12
Payer: COMMERCIAL

## 2024-03-12 VITALS
TEMPERATURE: 98 F | BODY MASS INDEX: 26 KG/M2 | OXYGEN SATURATION: 100 % | HEART RATE: 76 BPM | DIASTOLIC BLOOD PRESSURE: 76 MMHG | SYSTOLIC BLOOD PRESSURE: 130 MMHG | WEIGHT: 159.13 LBS | RESPIRATION RATE: 20 BRPM

## 2024-03-12 DIAGNOSIS — R73.09 ELEVATED HEMOGLOBIN A1C: ICD-10-CM

## 2024-03-12 DIAGNOSIS — M54.2 NECK PAIN: Primary | ICD-10-CM

## 2024-03-12 DIAGNOSIS — G89.29 CHRONIC RIGHT SHOULDER PAIN: ICD-10-CM

## 2024-03-12 DIAGNOSIS — B35.1 ONYCHOMYCOSIS: ICD-10-CM

## 2024-03-12 DIAGNOSIS — Z51.81 MEDICATION MONITORING ENCOUNTER: ICD-10-CM

## 2024-03-12 DIAGNOSIS — F41.9 ANXIETY: ICD-10-CM

## 2024-03-12 DIAGNOSIS — M25.511 CHRONIC RIGHT SHOULDER PAIN: ICD-10-CM

## 2024-03-12 LAB
ALBUMIN SERPL-MCNC: 4.2 G/DL (ref 3.4–5)
ALBUMIN/GLOB SERPL: 1.3 {RATIO} (ref 1–2)
ALP LIVER SERPL-CCNC: 81 U/L
ALT SERPL-CCNC: 26 U/L
ANION GAP SERPL CALC-SCNC: 5 MMOL/L (ref 0–18)
AST SERPL-CCNC: 21 U/L (ref 15–37)
BILIRUB SERPL-MCNC: 0.4 MG/DL (ref 0.1–2)
BUN BLD-MCNC: 12 MG/DL (ref 9–23)
CALCIUM BLD-MCNC: 9.7 MG/DL (ref 8.5–10.1)
CHLORIDE SERPL-SCNC: 105 MMOL/L (ref 98–112)
CO2 SERPL-SCNC: 28 MMOL/L (ref 21–32)
CREAT BLD-MCNC: 1.21 MG/DL
EGFRCR SERPLBLD CKD-EPI 2021: 70 ML/MIN/1.73M2 (ref 60–?)
FASTING STATUS PATIENT QL REPORTED: NO
GLOBULIN PLAS-MCNC: 3.2 G/DL (ref 2.8–4.4)
GLUCOSE BLD-MCNC: 98 MG/DL (ref 70–99)
OSMOLALITY SERPL CALC.SUM OF ELEC: 286 MOSM/KG (ref 275–295)
POTASSIUM SERPL-SCNC: 4.3 MMOL/L (ref 3.5–5.1)
PROT SERPL-MCNC: 7.4 G/DL (ref 6.4–8.2)
SODIUM SERPL-SCNC: 138 MMOL/L (ref 136–145)

## 2024-03-12 PROCEDURE — 80053 COMPREHEN METABOLIC PANEL: CPT | Performed by: FAMILY MEDICINE

## 2024-03-12 PROCEDURE — 99214 OFFICE O/P EST MOD 30 MIN: CPT | Performed by: FAMILY MEDICINE

## 2024-03-12 PROCEDURE — 3075F SYST BP GE 130 - 139MM HG: CPT | Performed by: FAMILY MEDICINE

## 2024-03-12 PROCEDURE — 3078F DIAST BP <80 MM HG: CPT | Performed by: FAMILY MEDICINE

## 2024-03-12 PROCEDURE — 83036 HEMOGLOBIN GLYCOSYLATED A1C: CPT | Performed by: FAMILY MEDICINE

## 2024-03-12 RX ORDER — ACETAMINOPHEN AND CODEINE PHOSPHATE 300; 30 MG/1; MG/1
1 TABLET ORAL 2 TIMES DAILY PRN
Qty: 60 TABLET | Refills: 0 | Status: SHIPPED | OUTPATIENT
Start: 2024-03-12

## 2024-03-12 RX ORDER — ESCITALOPRAM OXALATE 5 MG/1
5 TABLET ORAL DAILY
Qty: 30 TABLET | Refills: 0 | Status: SHIPPED | OUTPATIENT
Start: 2024-03-12

## 2024-03-12 NOTE — PROGRESS NOTES
Chief Complaint   Patient presents with    Follow - Up     3 month and life insurance paperwork   Bloodwork follow up on sugars,     Physical         HPI  Pt is here for diabetes follow up. He is asking for supplemental disability paperwork to be completed and the insurance company want him to do a functional capacity evaluation for re-evaluation of his status. This was explained to pt and that I am unable to complete the form without this evaluation. He agreeable to seeing a physiatrist for further mmgt  Pt also needs follow up on his diabetes and needs CMP for mmgt of meds for treatment of onychomycosis    ROS  As per HPI and all other systems reviewed and are negative      Past Medical History:   Diagnosis Date    Anesthesia complication     Back problem     PONV (postoperative nausea and vomiting)     Visual impairment     glasses       Past Surgical History:   Procedure Laterality Date    CHOLECYSTECTOMY      EXCIS LUMBAR DISK,ONE LEVEL      OTHER  03/14/2019    anterior cervical six-seven discectomy, arthroplasty    OTHER SURGICAL HISTORY      \"ear surgery for hearing\" bilateral    REMOVAL GALLBLADDER      SHOULDER ARTHROSCOPY Right 1/14/22--Dr. Kenneth Mustafa    subacromial decompression and open distal clavical resection       Social History     Socioeconomic History    Marital status:    Tobacco Use    Smoking status: Never    Smokeless tobacco: Never   Vaping Use    Vaping Use: Never used   Substance and Sexual Activity    Alcohol use: Not Currently     Alcohol/week: 0.0 standard drinks of alcohol     Comment: occasional (rare at a party)    Drug use: No   Other Topics Concern    Caffeine Concern No     Comment: 1 cup a day    Exercise No       Family History   Problem Relation Age of Onset    Heart Disorder Father     Heart Disorder Mother     Heart Disorder Brother         CABG        Current Outpatient Medications on File Prior to Visit   Medication Sig Dispense Refill    atorvastatin (LIPITOR) 10  MG Oral Tab Take 1 tablet (10 mg total) by mouth daily. 90 tablet 0    metFORMIN 500 MG Oral Tab Take 1 tablet (500 mg total) by mouth daily with breakfast. 90 tablet 0     No current facility-administered medications on file prior to visit.         Objective  Vitals:    03/12/24 1157   BP: 130/76   Pulse: 76   Resp: 20   Temp: 98.3 °F (36.8 °C)   TempSrc: Temporal   SpO2: 100%   Weight: 159 lb 2 oz (72.2 kg)     Physical Exam  Constitutional:       Appearance: Normal appearance.   HEENT:      Head: Normocephalic and atraumatic.      Eyes: PERRLA no notable nystagmus     Ears: TM normal no air fluid level normal appearing landmarks     Nose: Nose normal.      Mouth/Throat:      Mouth: Mucous membranes are moist.   Cardiovascular:      Rate and Rhythm: Normal rate and regular rhythm.   Pulmonary:      Effort: Pulmonary effort is normal.      Breath sounds: Normal breath sounds.   Abdominal:      General: Bowel sounds are normal.      Palpations: Abdomen is soft. There is no mass.   Musculoskeletal:         General: Normal range of motion.      Cervical back: Normal range of motion.   Skin:     General: Skin is warm and dry.   Neurological:      General: No focal deficit present.      Mental Status: She is alert and oriented to person, place, and time.   Psychiatric:         Mood and Affect: Mood normal.         Thought Content: Thought content normal.       Assessment and Plan  Jeffery was seen today for follow - up and physical.    Diagnoses and all orders for this visit:    Neck pain  -     Physiatry Referral - In Network  -     acetaminophen-codeine (TYLENOL WITH CODEINE #3) 300-30 MG Oral Tab; Take 1 tablet by mouth 2 (two) times daily as needed for Pain.    Chronic right shoulder pain  -     Physiatry Referral - In Network    Anxiety  -     escitalopram (LEXAPRO) 5 MG Oral Tab; Take 1 tablet (5 mg total) by mouth daily.    Onychomycosis  -     Comp Metabolic Panel (14) [E]    Medication monitoring encounter  -      Comp Metabolic Panel (14) [E]    Elevated hemoglobin A1c  -     Hemoglobin A1C [E]           Follow up  No follow-ups on file.      Patient Instructions  There are no Patient Instructions on file for this visit.       Flora Fang MD

## 2024-03-13 LAB
EST. AVERAGE GLUCOSE BLD GHB EST-MCNC: 123 MG/DL (ref 68–126)
HBA1C MFR BLD: 5.9 % (ref ?–5.7)

## 2024-03-19 ENCOUNTER — TELEPHONE (OUTPATIENT)
Dept: FAMILY MEDICINE CLINIC | Facility: CLINIC | Age: 59
End: 2024-03-19

## 2024-03-19 NOTE — TELEPHONE ENCOUNTER
Spoke with Kristy from [facility].  Pt had an appointment with her today re: filling out paperwork for disability. Provider was confused as pt had appt with Dr. Jamison last week. Had a difficult time understanding what the patient needed.   Also asking for pain medication  Kristy from [facility] would not fill out paperwork until she could view all his previous notes.    Pt has appt with physiatrist tomorrow  Future Appointments   Date Time Provider Department Center   3/20/2024  9:00 AM [facility]inder Thompson[id number]

## 2024-03-19 NOTE — TELEPHONE ENCOUNTER
Prescription had been sent in on 3/12/24 for quantity #60    Prescription was not picked up - pharmacy needs more information and had faxed request over.

## 2024-03-19 NOTE — TELEPHONE ENCOUNTER
Kristy just had a visit with pt, she needs more info on him. Kristy said it was a very confusing appt for her.

## 2024-03-19 NOTE — TELEPHONE ENCOUNTER
Contacted [facility] [place].  Explained fax for this medication was not found - pharmacy will  refax form

## 2024-03-20 ENCOUNTER — OFFICE VISIT (OUTPATIENT)
Dept: PHYSICAL MEDICINE AND REHAB | Facility: CLINIC | Age: 59
End: 2024-03-20
Payer: COMMERCIAL

## 2024-03-20 VITALS
OXYGEN SATURATION: 98 % | HEIGHT: 66 IN | WEIGHT: 159 LBS | BODY MASS INDEX: 25.55 KG/M2 | HEART RATE: 98 BPM | RESPIRATION RATE: 18 BRPM

## 2024-03-20 DIAGNOSIS — M54.12 CERVICAL RADICULOPATHY: Primary | ICD-10-CM

## 2024-03-20 PROCEDURE — 99215 OFFICE O/P EST HI 40 MIN: CPT | Performed by: PHYSICAL MEDICINE & REHABILITATION

## 2024-03-20 PROCEDURE — 3008F BODY MASS INDEX DOCD: CPT | Performed by: PHYSICAL MEDICINE & REHABILITATION

## 2024-03-20 NOTE — PROGRESS NOTES
Progress note    C/C:   Chief Complaint   Patient presents with    Neck Pain     Pt is coming in F/U on right neck pain, Pain radiates to right shoulder and down right arm, Admits to numbness in entire right hand and fingers 1-5. Pain 7/10      HPI: 58 year old male presents for follow up. Since the last time I saw him he has essentially had the same symptoms of persistent right neck, upper trapezial and upper shoulder pain that is constant, worsens with activity. He was seen by Dr. Galindo for follow up who did not recommend further surgery, had the patient do an FCE and then gave him a note for total disability. The patient has been on disability through social security since December of 2022.     Since the last time I saw him he was prescribed norco and then discontinued by PCP; did another round of PT. His PCP currently prescribes him tylenol with codeine and lexapro. He is currently seeking further disability, has supplemental paperwork and was referred by his primary care physician to assist in this process. He brings with him a copy of the FCE ordered by Dr. Galindo.     Pertinent allergies:   Allergies   Allergen Reactions    Penicillin G OTHER (SEE COMMENTS)     Per pt, when he took penicillin before , his red blood cells became low.        Physical exam:  Pulse 98   Resp 18   Ht 66\"   Wt 159 lb (72.1 kg)   SpO2 98%   BMI 25.66 kg/m²      Cervical spine exam:    No neck rash  Quite tender to palpation right cervical paraspinals and upper trapezius  Gives way strength with right shoulder abduction strength testing. Slight weakness in elbow flexion and extension. Otherwise 5/5 wrist extension, finger flexion, FDI bilaterally  SILT C5-S1 dermatomes  2/4 biceps, brachioradialis, triceps reflexes b/l  Hurst test negative b/l    Imaging: No new imaging available to review    Assessment and plan  Right cervical radiculitis  Cervical myofascial pain syndrome    Paperwork filled out for patient; he may  continue symptomatic treatment with his PCP. Follow up PRN.     40 minutes spent precharting, conducting H&P, discussing treatment options, completing documentation.    Manjinder Thompson DO  Physical Medicine and Rehabilitation  [facility]

## 2024-03-22 ENCOUNTER — TELEPHONE (OUTPATIENT)
Dept: PHYSICAL MEDICINE AND REHAB | Facility: CLINIC | Age: 59
End: 2024-03-22

## 2024-03-22 NOTE — TELEPHONE ENCOUNTER
Completed disability forms placed at the  at [place] location for patient to . Copy of forms sent to scan.     MCM sent to patient

## 2024-03-27 NOTE — TELEPHONE ENCOUNTER
Language Line used for the following conversation   #[id number]    Left detailed message to voicemail (per verbal release form consent with confirmed identifying message) of note below. Patient was advised to call office back with any questions/concerns.

## 2024-03-27 NOTE — TELEPHONE ENCOUNTER
No fax found at this time    Called [facility] pharmacy ph#[phone] - inquired about Rx update - pharmacist states that pt's insurance will only cover 7 day supply - max limit     Need to ask pt if Ok to change Rx to dispense for 7 day supply?    Checked GoodRx coupon - 60 tabs would cost ~$14-15 at [facility] or [place] pharmacy

## 2024-09-17 NOTE — TELEPHONE ENCOUNTER
Any GLP 1 agonist available to him with insurance Currently paying cash for wglexus   Called pt's spouse - declines  - stating she needs pt's medication list    Advised can print pt's medication list - will place in pt blue book for pt  - she v/u    Spouse reports they have new insurance info - advised to call office to provide new insurance info - or when she comes to office to  med list - she v/u    Current medication list printed and placed in pt blue book for pt

## 2025-01-06 ENCOUNTER — APPOINTMENT (OUTPATIENT)
Dept: UROLOGY | Age: 60
End: 2025-01-06

## 2025-01-06 VITALS — BODY MASS INDEX: 26.2 KG/M2 | WEIGHT: 163 LBS | TEMPERATURE: 97.3 F | HEIGHT: 66 IN

## 2025-01-06 DIAGNOSIS — N40.1 BENIGN PROSTATIC HYPERPLASIA WITH NOCTURIA: Primary | ICD-10-CM

## 2025-01-06 DIAGNOSIS — R35.1 BENIGN PROSTATIC HYPERPLASIA WITH NOCTURIA: Primary | ICD-10-CM

## 2025-01-06 LAB — BLDR SCAN MLS: NORMAL

## 2025-01-06 RX ORDER — DULOXETIN HYDROCHLORIDE 30 MG/1
30 CAPSULE, DELAYED RELEASE ORAL DAILY
COMMUNITY
Start: 2024-12-17

## 2025-01-06 RX ORDER — ACETAMINOPHEN AND CODEINE PHOSPHATE 300; 30 MG/1; MG/1
TABLET ORAL
COMMUNITY
Start: 2024-12-17

## 2025-01-06 RX ORDER — ATORVASTATIN CALCIUM 20 MG/1
20 TABLET, FILM COATED ORAL AT BEDTIME
COMMUNITY
Start: 2024-12-17

## 2025-01-06 RX ORDER — TAMSULOSIN HYDROCHLORIDE 0.4 MG/1
CAPSULE ORAL
COMMUNITY
Start: 2024-12-17

## 2025-01-17 ENCOUNTER — EXTERNAL LAB (OUTPATIENT)
Dept: HEALTH INFORMATION MANAGEMENT | Facility: OTHER | Age: 60
End: 2025-01-17

## 2025-01-17 LAB — PSA SERPL-MCNC: 1.15 NG/ML

## 2025-02-11 ENCOUNTER — APPOINTMENT (OUTPATIENT)
Dept: UROLOGY | Age: 60
End: 2025-02-11

## 2025-02-11 DIAGNOSIS — R35.1 BENIGN PROSTATIC HYPERPLASIA WITH NOCTURIA: Primary | ICD-10-CM

## 2025-02-11 DIAGNOSIS — N40.1 BENIGN PROSTATIC HYPERPLASIA WITH NOCTURIA: Primary | ICD-10-CM

## 2025-02-11 RX ORDER — SULFAMETHOXAZOLE AND TRIMETHOPRIM 800; 160 MG/1; MG/1
1 TABLET ORAL 2 TIMES DAILY
Qty: 2 TABLET | Refills: 0 | Status: SHIPPED | OUTPATIENT
Start: 2025-02-11 | End: 2025-02-12

## 2025-04-08 ENCOUNTER — TELEPHONE (OUTPATIENT)
Dept: PHYSICAL MEDICINE AND REHAB | Facility: CLINIC | Age: 60
End: 2025-04-08

## 2025-04-08 NOTE — TELEPHONE ENCOUNTER
Patient is currently seeing Dr Thompson, however he is interested in seeing Dr Behar due to the language barrier.  He has no problem with [facility], he would just feel better seeing someone who spoke Irish. Can a transfer be allowed?

## 2025-05-12 ENCOUNTER — APPOINTMENT (OUTPATIENT)
Dept: UROLOGY | Age: 60
End: 2025-05-12

## 2025-05-19 ENCOUNTER — OFFICE VISIT (OUTPATIENT)
Dept: PHYSICAL MEDICINE AND REHAB | Facility: CLINIC | Age: 60
End: 2025-05-19
Payer: MEDICARE

## 2025-05-19 VITALS
WEIGHT: 159 LBS | HEART RATE: 73 BPM | SYSTOLIC BLOOD PRESSURE: 136 MMHG | OXYGEN SATURATION: 97 % | DIASTOLIC BLOOD PRESSURE: 80 MMHG | HEIGHT: 66 IN | BODY MASS INDEX: 25.55 KG/M2

## 2025-05-19 DIAGNOSIS — F41.9 ANXIETY AND DEPRESSION: ICD-10-CM

## 2025-05-19 DIAGNOSIS — G25.89 SCAPULAR DYSKINESIS: ICD-10-CM

## 2025-05-19 DIAGNOSIS — M99.9 BIOMECHANICAL LESION: ICD-10-CM

## 2025-05-19 DIAGNOSIS — M54.12 CERVICAL RADICULOPATHY: ICD-10-CM

## 2025-05-19 DIAGNOSIS — M50.30 DDD (DEGENERATIVE DISC DISEASE), CERVICAL: ICD-10-CM

## 2025-05-19 DIAGNOSIS — M48.02 FORAMINAL STENOSIS OF CERVICAL REGION: ICD-10-CM

## 2025-05-19 DIAGNOSIS — M79.10 MYALGIA: Primary | ICD-10-CM

## 2025-05-19 DIAGNOSIS — F32.A ANXIETY AND DEPRESSION: ICD-10-CM

## 2025-05-19 DIAGNOSIS — S46.111D LABRAL TEAR OF LONG HEAD OF RIGHT BICEPS TENDON, SUBSEQUENT ENCOUNTER: ICD-10-CM

## 2025-05-19 DIAGNOSIS — M47.812 CERVICAL FACET SYNDROME: ICD-10-CM

## 2025-05-19 DIAGNOSIS — Z98.890 S/P CERVICAL DISC REPLACEMENT: ICD-10-CM

## 2025-05-19 DIAGNOSIS — M54.2 TRIGGER POINT OF NECK: ICD-10-CM

## 2025-05-19 PROCEDURE — 99214 OFFICE O/P EST MOD 30 MIN: CPT | Performed by: PHYSICAL MEDICINE & REHABILITATION

## 2025-05-19 PROCEDURE — 3079F DIAST BP 80-89 MM HG: CPT | Performed by: PHYSICAL MEDICINE & REHABILITATION

## 2025-05-19 PROCEDURE — 3075F SYST BP GE 130 - 139MM HG: CPT | Performed by: PHYSICAL MEDICINE & REHABILITATION

## 2025-05-19 PROCEDURE — 3008F BODY MASS INDEX DOCD: CPT | Performed by: PHYSICAL MEDICINE & REHABILITATION

## 2025-05-19 RX ORDER — CELECOXIB 100 MG/1
100 CAPSULE ORAL 2 TIMES DAILY
Qty: 60 CAPSULE | Refills: 0 | Status: SHIPPED | OUTPATIENT
Start: 2025-05-19

## 2025-05-19 RX ORDER — DULOXETIN HYDROCHLORIDE 30 MG/1
30 CAPSULE, DELAYED RELEASE ORAL DAILY
Qty: 30 CAPSULE | Refills: 2 | Status: SHIPPED | OUTPATIENT
Start: 2025-05-19

## 2025-05-19 NOTE — PROGRESS NOTES
The following individual(s) verbally consented to be recorded using ambient AI listening technology and understand that they can each withdraw their consent to this listening technology at any point by asking the clinician to turn off or pause the recording:    Patient name: Jeffery Solorzano  Additional names:

## 2025-05-19 NOTE — PATIENT INSTRUCTIONS
1) Please begin physical therapy as soon as possible.   2) We can consider bilateral C4-C5 and C5-C6 facet joint injections   3) Tylenol 500-1000 mg every 6-8 hours as needed for pain.  No more than 3000 mg daily.  4) Take Celebrex 100 mg 1 tablet twice per day with food for the next two weeks and then as needed but no more than 2 tablets per day. Do not take with any other NSAIDS (Ibuprofen, Advil, Aleve, etc). OK to take Tylenol 500 mg every 6 hours as needed for pain. If you develop any side effects including stomach aches, nausea, vomiting, or other gastrointestinal symptoms, stop the medication and call my office.   5) Start Duloxetine 30 mg daily. DO not stop taking  6) Follow up with me in about 2 months. If symptoms continue, then we will proceed with facet joint injections

## 2025-05-20 NOTE — PROGRESS NOTES
[facility]  Progress Note    CHIEF COMPLAINT:    Chief Complaint   Patient presents with    New Patient     New R handed pt presents with R sided neck pain that radiates to R shoulder and arm. Reports sharp and aching pain. Admits injury during work in 2020. Pain is 8/10. Admits N/T and weakness. Taking meloxicam, tylenol with codeine. Reports PT in past. Cervical KAREN in 2020, cervical myelogram in 2022. CT C spine 3/24/22, XR Cervical 2/21/22.       History of Present Illness:  The patient is a 59 year old right-handed male with a significant past medical history of a C6-C7 disc replacement in 2019 as well as a right shoulder arthroscopy in January 2022 for work injury which she is now on disability for presenting for bilateral neck pain and parascapular pain which is aggravated by rotation or extension of the cervical spine.  He rates the discomfort an 8 out of 10.  He associates this with numbness and tingling in the arms.  He has been taking meloxicam, Tylenol with codeine, and has tried physical therapy as well as cervical epidural steroid injections in the past.  He had a cervical myelogram in 2022.  His last x-rays of the cervical spine were also in 2022.  His last cervical epidural steroid injection was performed by Dr. Case in March 2022 with limited improvement.  He associates his neck pain with right arm weakness.    PAST MEDICAL HISTORY:  Past Medical History[1]    SURGICAL HISTORY:  Past Surgical History[2]    SOCIAL HISTORY:   Social History     Occupational History    Not on file   Tobacco Use    Smoking status: Never    Smokeless tobacco: Never   Vaping Use    Vaping status: Never Used   Substance and Sexual Activity    Alcohol use: Not Currently     Alcohol/week: 0.0 standard drinks of alcohol     Comment: occasional (rare at a party)    Drug use: No    Sexual activity: Not on file       FAMILY HISTORY:   Family History[3]    CURRENT MEDICATIONS:    Current Medications[4]    ALLERGIES:   Allergies[5]    REVIEW OF SYSTEMS:   Review of Systems   Constitutional: Negative.    HENT: Negative.    Eyes: Negative.    Respiratory: Negative.    Cardiovascular: Negative.    Gastrointestinal: Negative.    Genitourinary: Negative.    Musculoskeletal: As per HPI  Skin: Negative.    Neurological: As per HPI  Endo/Heme/Allergies: Negative.    Psychiatric/Behavioral: Negative.      All other systems reviewed and are negative. Pertinent positives and negatives noted in the HPI.        PHYSICAL EXAM:   /80   Pulse 73   Ht 66\"   Wt 159 lb (72.1 kg)   SpO2 97%   BMI 25.66 kg/m²     Body mass index is 25.66 kg/m².      General: No immediate distress  Head: Normocephalic/ Atraumatic  Eyes: Extra-occular movements intact.   Ears: No auricular hematoma or deformities  Mouth: No lesions or ulcerations  Heart: peripheral pulses intact. Normal capillary refill.   Lungs: Non-labored respirations  Abdomen: No abdominal guarding  Extremities: No lower extremity edema bilaterally   Skin: No lesions noted.   Cognition: alert & oriented x 3, attentive, able to follow 2 step commands, comprehension intact, spontaneous speech intact  Motor:    Musculoskeletal:    CERVICAL SPINE:  Inspection: no erythema, swelling, or obvious deformity  Palpation: Tender to palpation midline lumbar spine as well as bilateral cervical facets from C3 down through C7, bilateral cervical paraspinals, trapezius, levator scapula, and rhomboids with myofascial trigger points  ROM: intact to all planes of motion of cervical spine including side-bend bilaterally, rotation bilaterally, flexion, and extension with pain in all planes of motion  Strength: 5/5 in all myotomes of the BILATERAL upper extremities except 4 out of 5 during right shoulder abduction, elbow flexion, wrist extension, and elbow extension  Sensation: Intact to light touch in all dermatomes of the BILATERAL upper extremities   Reflexes: 1/4 at  C5, C6, C7 with a negative Hurst's sign  Spurling Test: negative for radicular symptoms down either extremity bilaterally    SHOULDER:  Inspection: no erythema, swelling, or obvious deformity.  No AC joint deformity.  Andrew's deformity noted on the right        Data  No visits with results within 6 Month(s) from this visit.   Latest known visit with results is:   Office Visit on 03/12/2024   Component Date Value Ref Range Status    Glucose 03/12/2024 98  70 - 99 mg/dL Final    Sodium 03/12/2024 138  136 - 145 mmol/L Final    Potassium 03/12/2024 4.3  3.5 - 5.1 mmol/L Final    Chloride 03/12/2024 105  98 - 112 mmol/L Final    CO2 03/12/2024 28.0  21.0 - 32.0 mmol/L Final    Anion Gap 03/12/2024 5  0 - 18 mmol/L Final    BUN 03/12/2024 12  9 - 23 mg/dL Final    Creatinine 03/12/2024 1.21  0.70 - 1.30 mg/dL Final    Calcium, Total 03/12/2024 9.7  8.5 - 10.1 mg/dL Final    Calculated Osmolality 03/12/2024 286  275 - 295 mOsm/kg Final    eGFR-Cr 03/12/2024 70  >=60 mL/min/1.73m2 Final    AST 03/12/2024 21  15 - 37 U/L Final    ALT 03/12/2024 26  16 - 61 U/L Final    Alkaline Phosphatase 03/12/2024 81  45 - 117 U/L Final    Bilirubin, Total 03/12/2024 0.4  0.1 - 2.0 mg/dL Final    Total Protein 03/12/2024 7.4  6.4 - 8.2 g/dL Final    Albumin 03/12/2024 4.2  3.4 - 5.0 g/dL Final    Globulin  03/12/2024 3.2  2.8 - 4.4 g/dL Final    A/G Ratio 03/12/2024 1.3  1.0 - 2.0 Final    Patient Fasting for CMP? 03/12/2024 No   Final    HgbA1C 03/12/2024 5.9 (H)  <5.7 % Final    Estimated Average Glucose 03/12/2024 123  68 - 126 mg/dL Final   ]      Radiology Imaging:  I reviewed with the patient his CT and MRI of the cervical spine   MRI SPINE CERVICAL (W+WO) (CPT=72156)  Narrative: PROCEDURE:  MRI SPINE CERVICAL (W+WO) (CPT=72156)     COMPARISON:  [place], XR, XR CERVICAL SPINE COMPLETE W/FLEX + EXT (CPT=72052), 8/27/2021, 9:03 AM.  [facility], MRI SPINE CERVICAL (CPT=72141), 7/08/2019, 8:30 AM.     INDICATIONS:  Z98.890 S/P  cervical disc replacement R20.0 Right arm numbness M54.2 Neck pain     TECHNIQUE:  Multiplanar T1 and T2 weighted images including fat suppression sequences.  Images acquired in sagittal and axial planes.  Intravenous gadolinium was administered followed by multiplanar post-infusion T1 weighted sequences.       PATIENT STATED HISTORY:(As transcribed by Technologist)  Prev history of surgery to cervical spine with dixc replacement. Pt continues to cervical pain with right arm numbness.      CONTRAST USED:  15 mL of Dotarem     FINDINGS:    CERVICAL DISC LEVELS:  C2-C3:  No significant disc/facet abnormality, spinal stenosis, or foraminal narrowing.  C3-C4:  No significant disc/facet abnormality, spinal stenosis, or foraminal narrowing.  C4-C5:  No significant disc/facet abnormality, spinal stenosis, or foraminal narrowing.  C5-C6:  Evaluation at this level was limited by artifact from metallic hardware at C6-C7.  There is a diffuse disc osteophyte complex noted.  This does not cause significant stenosis of central canal or foramina.  C6-C7:  There has been previous surgery at this disc level.  Susceptibility artifact related to the surgical hardware obscures the central canal.  Within the limits of this study there is no evidence of residual or recurrent disc pathology.  C7-T1:  No significant disc/facet abnormality, spinal stenosis, or foraminal narrowing.          CRANIOCERVICAL AREA:  Normal foramen magnum with no Chiari malformation.    PARASPINAL AREA:  Normal with no visible mass.    BONY STRUCTURES:  Arthroplasty device C6-C7 appears stable.  This is not well evaluated due to the susceptibility artifact.  CORD:  Normal caliber, contour and signal intensity.                     Impression: CONCLUSION:    1. Previous arthroplasty is noted at C6-C7.  This does not appear to be significantly changed.  2. Diffuse disc osteophyte complex at C5-C6 does not cause any significant stenosis of central canal or foramina.           Dictated by (CST): Servando Hall MD on 9/01/2021 at 8:42 AM       Finalized by (CST): Servando Hall MD on 9/01/2021 at 8:46 AM       PROCEDURE:  CT CERVICAL SPINE FOR MYELOGRAM (IV) (CPT=72126)     COMPARISON:  None.     INDICATIONS:  R29.898 Weakness of both lower extremities R29.898 Weakness of both upper extremities M54.2 Neck pain Z98.890 S/P cervical disc replacement M54.12.  Severe right shoulder pain and right middle finger pain.       TECHNIQUE:  An injection for CT myelogram was performed in the usual sterile manner via lumbar puncture as described separately. The patient was then transferred to the CT scanning suite, where multi-planar CT images were created.  Dose reduction  techniques were used. Dose information is transmitted to the ACR (American College of Radiology) NRDR (National Radiology Data Registry) which includes the Dose Index Registry.  Please refer to the cervical plain film myelogram dictation which is  reported separately.           FINDINGS:    CRANIOCERVICAL AREA:  Normal foramen magnum with no Chiari malformation.  PARASPINAL AREA:  Normal with no visible mass.    BONES:   No fracture, subluxation or dislocation.  There is a metallic disc prosthesis at C6-7.  There is moderate left facet arthropathy at C7-T1.  CORD:  Normal caliber and contour.       CERVICAL DISC LEVELS:  C2-C3:  No significant disc/facet abnormality, spinal stenosis, or foraminal stenosis.  C3-C4:  Small disc osteophyte complex noted with minimal calcification.  No significant thecal sac effacement identified.  There is no central spinal canal stenosis.  The bilateral neural foramina are patent.  C4-C5:  No significant disc/facet abnormality, spinal stenosis, or foraminal stenosis.  C5-C6:  There is a diffuse bulging disc appearing slightly asymmetric flattening the ventral surface of the thecal sac especially the left paracentral ventral surface.  There is no bony central spinal canal stenosis and the neural  foramina appear patent  bilaterally.  C6-C7:  There is a metallic disc prosthesis which limits fine detail because of artifact.  There is no central bony spinal canal stenosis.  There is no significant effacement of the thecal sac and the neural foramina appear patent bilaterally.  C7-T1:  No significant disc abnormality.  There is no spinal canal stenosis or foraminal stenosis.  There is moderate left facet arthropathy.                   Impression   CONCLUSION:    1. There is a asymmetric bulging disc at C5-6 flattening the ventral surface of the thecal sac especially on the left.  There is no central spinal canal stenosis.  The AP diameter of the thecal sac measures 12 mm at C5-6.  The neural foramina appear  patent bilaterally.  2. Postoperative changes of metallic disc prosthesis at C6-7.  3. Small disc osteophyte complex at C3-4 without thecal sac effacement.  4. Left facet arthropathy at C7-T1.          Dictated by (CST): Alejo Silva MD on 3/24/2022 at 4:20 PM      Finalized by (CST): Alejo Silva MD on 3/24/2022 at 4:33 PM       ASSESSMENT AND PLAN:  The patient is a pleasant 59-year-old male presents with bilateral neck pain and myofascial scapular pain which I believe is due to cervical spondylosis with facet arthropathy leading to scapular stabilizing weakness and myofascial pain.  I would like for him to begin formal physical therapy as soon as possible.  He should use Tylenol and Celebrex for pain.  I have also started him on duloxetine.  He was previously on an antidepressant but stopped taking this several months ago.  I would like to try this for depression and pain.  We can consider bilateral C4-C5 and C5-C6 facet joint injections in the future.  He will follow-up with me in about 2 months.       RTC in 2 months  Discharge Instructions were provided as documented in AVS summary.  The patient was in agreement with the assessment and plan.  All questions were answered.  There were no  barriers to learning.         1. Myalgia    2. Scapular dyskinesis    3. Biomechanical lesion    4. Cervical radiculopathy    5. Cervical facet syndrome    6. Trigger point of neck    7. Foraminal stenosis of cervical region    8. DDD (degenerative disc disease), cervical    9. Anxiety and depression    10. S/P cervical disc replacement        Alex B. Behar MD  Physical Medicine and Rehabilitation/Sports Medicine  [address] Cures Act Notice to Patient: Medical documents like this are made available to patients in the interest of transparency. However, be advised this is a medical document and it is intended as [id number] communication between your medical providers. This medical document may contain abbreviations, assessments, medical data, and results or other terms that are unfamiliar. Medical documents are intended to carry relevant information, facts as evident, and the clinical opinion of the practitioner. As such, this medical document may be written in language that appears blunt or direct. You are encouraged to contact your medical provider and/or [facility] Patient Experience if you have any questions about this medical document.        [1]   Past Medical History:   Anesthesia complication    Back problem    PONV (postoperative nausea and vomiting)    Visual impairment    glasses   [2]   Past Surgical History:  Procedure Laterality Date    Cholecystectomy      Excis lumbar disk,one level      Other  03/14/2019    anterior cervical six-seven discectomy, arthroplasty    Other surgical history      \"ear surgery for hearing\" bilateral    Removal gallbladder      Shoulder arthroscopy Right 1/14/22--Dr. Kenneth Mustafa    subacromial decompression and open distal clavical resection   [3]   Family History  Problem Relation Age of Onset    Heart Disorder Father     Heart Disorder Mother     Heart Disorder Brother         CABG   [4]   Current Outpatient Medications    Medication Sig Dispense Refill    celecoxib (CELEBREX) 100 MG Oral Cap Take 1 capsule (100 mg total) by mouth 2 (two) times daily. 60 capsule 0    DULoxetine 30 MG Oral Cap DR Particles Take 1 capsule (30 mg total) by mouth daily. 30 capsule 2    acetaminophen-codeine (TYLENOL WITH CODEINE #3) 300-30 MG Oral Tab Take 1 tablet by mouth 2 (two) times daily as needed for Pain. 60 tablet 0    metFORMIN 500 MG Oral Tab Take 1 tablet (500 mg total) by mouth daily with breakfast. 90 tablet 0   [5]   Allergies  Allergen Reactions    Penicillin G OTHER (SEE COMMENTS)     Per pt, when he took penicillin before , his red blood cells became low.

## 2025-05-22 ENCOUNTER — APPOINTMENT (OUTPATIENT)
Dept: UROLOGY | Age: 60
End: 2025-05-22

## 2025-05-22 VITALS — HEIGHT: 66 IN | BODY MASS INDEX: 26.2 KG/M2 | WEIGHT: 163 LBS

## 2025-05-22 DIAGNOSIS — N40.1 BENIGN PROSTATIC HYPERPLASIA WITH NOCTURIA: Primary | ICD-10-CM

## 2025-05-22 DIAGNOSIS — R35.1 BENIGN PROSTATIC HYPERPLASIA WITH NOCTURIA: Primary | ICD-10-CM

## 2025-05-22 RX ORDER — TAMSULOSIN HYDROCHLORIDE 0.4 MG/1
0.4 CAPSULE ORAL AT BEDTIME
Qty: 90 CAPSULE | Refills: 3 | Status: SHIPPED | OUTPATIENT
Start: 2025-05-22

## 2025-05-22 RX ORDER — CELECOXIB 100 MG/1
100 CAPSULE ORAL
COMMUNITY
Start: 2025-05-19

## 2025-06-05 ENCOUNTER — TELEPHONE (OUTPATIENT)
Dept: PHYSICAL THERAPY | Facility: HOSPITAL | Age: 60
End: 2025-06-05

## 2025-06-06 ENCOUNTER — OFFICE VISIT (OUTPATIENT)
Dept: PHYSICAL THERAPY | Age: 60
End: 2025-06-06
Attending: PHYSICAL MEDICINE & REHABILITATION
Payer: MEDICARE

## 2025-06-06 DIAGNOSIS — M47.812 CERVICAL FACET SYNDROME: ICD-10-CM

## 2025-06-06 DIAGNOSIS — M79.10 MYALGIA: Primary | ICD-10-CM

## 2025-06-06 DIAGNOSIS — M50.30 DDD (DEGENERATIVE DISC DISEASE), CERVICAL: ICD-10-CM

## 2025-06-06 DIAGNOSIS — G25.89 SCAPULAR DYSKINESIS: ICD-10-CM

## 2025-06-06 PROCEDURE — 97112 NEUROMUSCULAR REEDUCATION: CPT

## 2025-06-06 PROCEDURE — 97162 PT EVAL MOD COMPLEX 30 MIN: CPT

## 2025-06-06 NOTE — PROGRESS NOTES
SPINE EVALUATION:     Diagnosis:   Myalgia (M79.10),  Scapular dyskinesis (G25.89),  Cervical facet syndrome (M47.812),  DDD (degenerative disc disease), cervical (M50.30) Patient:  Jeffery Solorzano (59 year old, male)        Referring Provider: Alex Behar  Today's Date   6/6/2025    Precautions:  None (see PMHx)   Date of Evaluation: 06/06/25  Next MD visit: 8-4-25  Date of Surgery: n/a     PATIENT SUMMARY     Summary of chief complaints: neck pain and RUE pain  History of current condition: Pt reports hx of 2019 C6-7 disc replacement and 2020 R shoulder surgery. He has had chronic neck and RUE pain that was not relieved by these surgeries. R hand dominant. Worse when running fast, trying to lift arms to don shirts, feels weakness in RUE. Had N/T when saw MD 3 wks ago, but has not had any since then. Taking medications as prescribed. Admits injury during work in 2020 which he has remained off work on disability. Cervical KAREN in 2020, cervical myelogram in 2022. Hot shower helps. Worse when it rains. Sleep disturbed by pain; on back without pillow (pillow causes pain), or side (bipin R side incr pain).   Pain level: current 6 /10, at best 2 /10, at worst 9 /10    Current limitations: incr pain R sidelying, prolonged positioning without movement, lifting arms to don shirt, running fast  Pt goals: try to improve shoulder strengh, and pain if possible  Past medical history was reviewed with Jeffery.  Significant findings include: C6-C7 disc replacement in 2019 as well as a right shoulder arthroscopy in January 2022    Jeffery  has a past medical history of Anesthesia complication, Back problem, PONV (postoperative nausea and vomiting), and Visual impairment.  He  has a past surgical history that includes other surgical history; cholecystectomy; other (03/14/2019); shoulder arthroscopy (Right, 1/14/22--Dr. Kenneth Mustafa); removal gallbladder; and excis lumbar disk,one level.    ASSESSMENT  Jeffery presents to  physical therapy evaluation with primary c/o neck pain and RUE pain. The results of the objective tests and measures show impaired upper thoracic mobility/posture, decr scapular strength B, soft tissue dysfunction. Functional deficits include but are not limited to incr pain R sidelying, prolonged positioning without movement, lifting arms to don shirt, running fast. Signs and symptoms are consistent with diagnosis of Myalgia (M79.10),  Scapular dyskinesis (G25.89),  Cervical facet syndrome (M47.812),  DDD (degenerative disc disease), cervical (M50.30). Pt and PT discussed evaluation findings, pathology, POC and HEP.  Pt voiced understanding and performs HEP correctly without reported pain. Skilled Physical Therapy is medically necessary to address the above impairments and reach functional goals.    OBJECTIVE:      Musculoskeletal:  Observation/Posture: flattened upper thoracic kyphosis   Palpation: TTP T1-5 spinous processes, R UT/levator, B paraspinals T1-5, anterior R humeral head     ROM and Strength:  (* denotes performed with pain)     Cervical ROM   Cerv  Flex 65*neck   Cerv  Ext 63    R L   Cerv  Side bend 38* upper back 45* R shoulder   Cerv  Rotation 52*neck 50*neck   Shld flex 110* 143   Shld abd 105* 125   ,   Shoulder  MMT (-/5) @eval    R L     Flex 4+* 5     Abd (C5) 4+* 5     IR 4* 5     ER 4-* 4     Low Trap 3* 3+     Mid Trap 3+* 4-        Myotome MMT   MMT (-/5)    R L   Shoulder Abd (C5) 4+* 5   Elbow Ext (C7) 4* 5   Elbow Flex (C6) 4+* 5   Wrist Flex (C7) 4+ 5   Wrist Ext (C6) 4+* 5    strength (lbs)  48  74       Neurological:  Sensation: -- (denies N/T BUE)        Balance and Functional Mobility:  Gait: pt ambulates on level ground with normal mechanics.     Today's Treatment and Response:   Pt education was provided on exam findings, treatment diagnosis, treatment plan, expectations, and prognosis.    Today's Treatment       6/6/2025   Spine Treatment   Therapeutic Exercise Ex's  performed as detailed in HEP section   Neuro Re-Education Pt educated on intramuscular trigger point dry needling via solid filament needles.   Instructions, precautions ( presence of implant in treatment area), risks and benefits thoroughly explained.   Obtained verbal informed consent on 6-6-25 and agreed to treatment starting next session.  Pt denied any of the following relative or absolute contraindications (on initial consent date above): hx of fainting/fear of needles, hx of bleeding disorder c impaired blood clotting, currently taking anticoagulants, pregnancy, currently taking antibiotics for infection, damaged heart valve or other risk of infection, presence of cancer or taking immunosuppressants, diabetic/impaired wound healing, metal allergy.   Therapeutic Exercise Minutes 8   Neuro Re-Educ Minutes 10   Total Time Of Timed Procedures 18   Total Time Of Service-Based Procedures 0   Total Treatment Time 18   HEP Access Code: [email or web address]  URL: [email or web address]  Date: 06/06/2025  Prepared by: Sejal Garibay    Exercises  - Seated Scapular Retraction  - 2 x daily - 2 sets - 10 reps  - Seated Thoracic Flexion and Rotation with Arms Crossed  - 2 x daily - 2 sets - 10 reps        Patient was instructed in and issued a HEP for: Access Code: [email or web address]  URL: [email or web address]  Date: 06/06/2025  Prepared by: Sejal Garibay    Exercises  - Seated Scapular Retraction  - 2 x daily - 2 sets - 10 reps  - Seated Thoracic Flexion and Rotation with Arms Crossed  - 2 x daily - 2 sets - 10 reps    Charges:  PT EVAL: Moderate Complexity, 1neuro  In agreement with evaluation findings and clinical rationale, this evaluation involved MODERATE COMPLEXITY decision making due to 1-2 personal factors/comorbidities, 3 or more body structures involved/activity limitations, and evolving symptoms as documented in the evaluation.                                                                          PLAN OF CARE:      Goals: (to be met in 8 visits)   Consistently decr pain < or = 3/10 intermittent for incr QOL and activity tolerance  Overall incr in function as indicated by NDI <20% disability  Incr RUE MMT at least 1/2 grade painfree for incr mm support for ADLs, exercise  Minimal to no pain c cervical and shoulder AROM to promote painfree ADLs  Incr R  strength at least 30% toward uninvolved side to progress to PLOF  Indep HEP to promote cont progress toward functional goals     Frequency / Duration: Patient will be seen 1-2x/week or a total of 8  visits over a 90 day period. Treatment will include: Manual Therapy; Neuromuscular Re-education; Therapeutic Exercise; Home Exercise Program instruction; Other (use comment) (dry needling)    Education or treatment limitation: None   Rehab Potential: -- (fair to good)     Outcome measure: Neck Disability Index to be filled out next session.    Patient/Family/Caregiver was advised of these findings, precautions, and treatment options and has agreed to actively participate in planning and for this course of care.    Thank you for your referral. Please co-sign or sign and return this letter via fax as soon as possible to [phone]. If you have any questions, please contact me at Dept: [phone]    Sincerely,  Electronically signed by therapist: Sejal Garibay PT  Physician's certification required: Yes  I certify the need for these services furnished under this plan of treatment and while under my care.    X___________________________________________________ Date____________________    Certification From: 6/6/2025  To: 9/4/2025

## 2025-06-11 ENCOUNTER — TELEPHONE (OUTPATIENT)
Dept: PHYSICAL MEDICINE AND REHAB | Facility: CLINIC | Age: 60
End: 2025-06-11

## 2025-06-11 NOTE — TELEPHONE ENCOUNTER
Pt called to ask if Dr. Behar can write letter to excuse him from meetings that are held at the [facility] due to him being in physical therapy for his medical condition. Pt requested that this be effective on 5/19/25 and good for a full year. Please advise, thank you.     Pt is Omani speaking.

## 2025-06-12 ENCOUNTER — ORDER TRANSCRIPTION (OUTPATIENT)
Dept: PHYSICAL THERAPY | Facility: HOSPITAL | Age: 60
End: 2025-06-12

## 2025-06-12 DIAGNOSIS — M25.511 RIGHT SHOULDER PAIN: ICD-10-CM

## 2025-06-12 DIAGNOSIS — G89.29 OTHER CHRONIC PAIN: Primary | ICD-10-CM

## 2025-06-12 DIAGNOSIS — M54.10 RADICULOPATHY: ICD-10-CM

## 2025-06-13 ENCOUNTER — OFFICE VISIT (OUTPATIENT)
Dept: PHYSICAL THERAPY | Age: 60
End: 2025-06-13
Attending: PHYSICAL MEDICINE & REHABILITATION
Payer: MEDICARE

## 2025-06-13 PROCEDURE — 97110 THERAPEUTIC EXERCISES: CPT

## 2025-06-13 PROCEDURE — 97140 MANUAL THERAPY 1/> REGIONS: CPT

## 2025-06-13 NOTE — PROGRESS NOTES
Patient: Jeffery Solorzano (59 year old, male) Referring Provider:  Insurance:   Diagnosis: Myalgia (M79.10),  Scapular dyskinesis (G25.89),  Cervical facet syndrome (M47.812),  DDD (degenerative disc disease), cervical (M50.30) Alex Behar HUMANA MCR   Date of Surgery: n/a Next MD visit:  N/A   Precautions:  None (see PMHx) 8-4-25 Referral Information:    Date of Evaluation: Req: 12, Auth: 12, Exp: 5/19/2026 06/06/25 POC Auth Visits:          Today's Date   6/13/2025    Subjective  Pt states he walked for 30 mins at the park and felt fine. If he walks 45 mins he feels some pain and needs to take a rest. But overall he feels better when moving and worse when he is inactive for a long period.       Pain: 6/10     Objective       Pt points to CT junction as site of pain today    SEE TX LOG     Assessment   Pt reports good tolerance of new ex's today s aggravating pain. New ex's targeted to improve scapular stability and thoracic mobility. Additions made to Hep as detailed below.    Goals (to be met in 8 visits)   Consistently decr pain < or = 3/10 intermittent for incr QOL and activity tolerance  Overall incr in function as indicated by NDI <20% disability  Incr RUE MMT at least 1/2 grade painfree for incr mm support for ADLs, exercise  Minimal to no pain c cervical and shoulder AROM to promote painfree ADLs  Incr R  strength at least 30% toward uninvolved side to progress to PLOF  Indep HEP to promote cont progress toward functional goals       Plan   Continue to address deficits    Treatment Last 4 Visits  Treatment Day: 2       6/6/2025 6/13/2025   Spine Treatment   Therapeutic Exercise Ex's performed as detailed in HEP section Retro UBE for scap strengthening and upp thoracic mobility 3min  Seated Scapular Retraction 5\"x20  R scap stab ball on wall up-down/side-side/cw/ccw  B row vs RTB 5\"x2min  Supine R serratus punch 1# 3\"x1min  Supine B shld horiz abd vs YTB x20  Supine B shld diagonal horiz abd vs YTB x20  ea R,L  Supine 'T' pec stretch 2min  Seated Thoracic Rotation with Arms Crossed x20 ea R,L  Scap stabilization slides up wall c pillowcase x20  HEP revise/review   Neuro Re-Education Pt educated on intramuscular trigger point dry needling via solid filament needles.   Instructions, precautions ( presence of implant in treatment area), risks and benefits thoroughly explained.   Obtained verbal informed consent on 6-6-25 and agreed to treatment starting next session.  Pt denied any of the following relative or absolute contraindications (on initial consent date above): hx of fainting/fear of needles, hx of bleeding disorder c impaired blood clotting, currently taking anticoagulants, pregnancy, currently taking antibiotics for infection, damaged heart valve or other risk of infection, presence of cancer or taking immunosuppressants, diabetic/impaired wound healing, metal allergy.    Manual Therapy  STM R UT/levator   Therapeutic Exercise Minutes 8 35   Neuro Re-Educ Minutes 10    Manual Therapy Minutes  8   Total Time Of Timed Procedures 18 43   Total Time Of Service-Based Procedures 0 0   Total Treatment Time 18 43   HEP Access Code: [email or web address]  URL: [email or web address]  Date: 06/06/2025  Prepared by: Sejal Garibay    Exercises  - Seated Scapular Retraction  - 2 x daily - 2 sets - 10 reps  - Seated Thoracic Flexion and Rotation with Arms Crossed  - 2 x daily - 2 sets - 10 reps ADD:   - Scapular Stability on Wall With Pillowcase (Hemiplegia)  - 2 x daily - 20 reps  - Standing Shoulder Diagonal Horizontal Abduction 60/120 Degrees with Resistance  - 2 x daily - 2 sets - 10 reps  - Standing Row with Anchored Resistance  - 2 x daily - 1-2 sets - 20 reps - 3 hold        HEP  ADD:   - Scapular Stability on Wall With Pillowcase (Hemiplegia)  - 2 x daily - 20 reps  - Standing Shoulder Diagonal Horizontal Abduction 60/120 Degrees with Resistance  - 2 x daily - 2 sets - 10 reps  - Standing Row with Anchored  Resistance  - 2 x daily - 1-2 sets - 20 reps - 3 hold    Charges  1man,2therex

## 2025-06-16 ENCOUNTER — ORDER TRANSCRIPTION (OUTPATIENT)
Dept: PHYSICAL THERAPY | Facility: HOSPITAL | Age: 60
End: 2025-06-16

## 2025-06-16 DIAGNOSIS — M54.10 RADICULOPATHY: ICD-10-CM

## 2025-06-16 DIAGNOSIS — M54.9 DORSALGIA, UNSPECIFIED: ICD-10-CM

## 2025-06-16 DIAGNOSIS — M25.511 RIGHT SHOULDER PAIN: ICD-10-CM

## 2025-06-16 DIAGNOSIS — G89.29 OTHER CHRONIC PAIN: Primary | ICD-10-CM

## 2025-06-20 ENCOUNTER — OFFICE VISIT (OUTPATIENT)
Dept: PHYSICAL THERAPY | Age: 60
End: 2025-06-20
Attending: PHYSICAL MEDICINE & REHABILITATION
Payer: MEDICARE

## 2025-06-20 PROCEDURE — 97110 THERAPEUTIC EXERCISES: CPT

## 2025-06-20 NOTE — PROGRESS NOTES
Patient: Jeffery Solorzano (59 year old, male) Referring Provider:  Insurance:   Diagnosis: Myalgia (M79.10),  Scapular dyskinesis (G25.89),  Cervical facet syndrome (M47.812),  DDD (degenerative disc disease), cervical (M50.30) Alex Behar  DALE REDDY   Date of Surgery: n/a Next MD visit:  N/A   Precautions:  None (see PMHx) 8-4-25 Referral Information:    Date of Evaluation: Req: 8, Auth: 8, Exp: 8/6/2025 06/06/25 POC Auth Visits:          Today's Date   6/20/2025    Subjective  Pt notes pain anterior humeral head thinks from sleeping on that side. He notes that his brother in [place] is sick and may be leaving to visit him next week.       Pain: 4/10     Objective          SEE TX LOG     Assessment   Pt tolerated several new ex's today to promote incr scapular strength and stabilization c fatigue. He appears to be moving his R shoulder today c less aberrant motion, improved fluidity of scapulohumeral rhythm. He reports upcoming trip to [place] for an extended period to visit with a sick family member, and will contact the office next week to rearrange his PT visits to align with his travel.    Goals (to be met in 8 visits)   Consistently decr pain < or = 3/10 intermittent for incr QOL and activity tolerance  Overall incr in function as indicated by NDI <20% disability  Incr RUE MMT at least 1/2 grade painfree for incr mm support for ADLs, exercise  Minimal to no pain c cervical and shoulder AROM to promote painfree ADLs  Incr R  strength at least 30% toward uninvolved side to progress to PLOF  Indep HEP to promote cont progress toward functional goals         Plan   Continue to address deficits to work toward PLOF and set goals    Treatment Last 4 Visits  Treatment Day: 3       6/6/2025 6/13/2025 6/20/2025   Spine Treatment   Therapeutic Exercise Ex's performed as detailed in HEP section Retro UBE for scap strengthening and upp thoracic mobility 3min  Seated Scapular Retraction 5\"x20  R scap stab ball on wall  up-down/side-side/cw/ccw  B row vs RTB 5\"x2min  Supine R serratus punch 1# 3\"x1min  Supine B shld horiz abd vs YTB x20  Supine B shld diagonal horiz abd vs YTB x20 ea R,L  Supine 'T' pec stretch 2min  Seated Thoracic Rotation with Arms Crossed x20 ea R,L  Scap stabilization slides up wall c pillowcase x20  HEP revise/review Retro UBE for scap strengthening and upp thoracic mobility 3min  Seated Scapular Retraction 5\"x20  Supine R serratus punch 1# 5\"x1min  Supine R shoulder stabilization cw/ccw from 90 deg 1# x20ea  Supine B shld horiz abd vs YTB 3x10  Supine B shld diagonal horiz abd vs YTB 3x10 ea R,L  Prone mid trap 3\"x20  B shld ER vs YTB x20  R scap stab ball on wall up-down/side-side/cw/ccw  B row vs RTB 5\"x2min  Lateral flexion c-spine 5\"x3 ea R,L  Scap stabilization slides up wall c pillowcase 2x20   Neuro Re-Education Pt educated on intramuscular trigger point dry needling via solid filament needles.   Instructions, precautions ( presence of implant in treatment area), risks and benefits thoroughly explained.   Obtained verbal informed consent on 6-6-25 and agreed to treatment starting next session.  Pt denied any of the following relative or absolute contraindications (on initial consent date above): hx of fainting/fear of needles, hx of bleeding disorder c impaired blood clotting, currently taking anticoagulants, pregnancy, currently taking antibiotics for infection, damaged heart valve or other risk of infection, presence of cancer or taking immunosuppressants, diabetic/impaired wound healing, metal allergy.     Manual Therapy  STM R UT/levator STM R upper trap/levator, C7-T2 paraspinals   Therapeutic Exercise Minutes 8 35 40   Neuro Re-Educ Minutes 10     Manual Therapy Minutes  8 8   Total Time Of Timed Procedures 18 43 48   Total Time Of Service-Based Procedures 0 0 0   Total Treatment Time 18 43 48   HEP Access Code: [email or web address]  URL: [email or web address]  Date: 06/06/2025  Prepared  by: Sejal Garibay    Exercises  - Seated Scapular Retraction  - 2 x daily - 2 sets - 10 reps  - Seated Thoracic Flexion and Rotation with Arms Crossed  - 2 x daily - 2 sets - 10 reps ADD:   - Scapular Stability on Wall With Pillowcase (Hemiplegia)  - 2 x daily - 20 reps  - Standing Shoulder Diagonal Horizontal Abduction 60/120 Degrees with Resistance  - 2 x daily - 2 sets - 10 reps  - Standing Row with Anchored Resistance  - 2 x daily - 1-2 sets - 20 reps - 3 hold         HEP  ADD:   - Scapular Stability on Wall With Pillowcase (Hemiplegia)  - 2 x daily - 20 reps  - Standing Shoulder Diagonal Horizontal Abduction 60/120 Degrees with Resistance  - 2 x daily - 2 sets - 10 reps  - Standing Row with Anchored Resistance  - 2 x daily - 1-2 sets - 20 reps - 3 hold    Charges  3therex

## 2025-06-27 ENCOUNTER — OFFICE VISIT (OUTPATIENT)
Dept: PHYSICAL THERAPY | Age: 60
End: 2025-06-27
Attending: PHYSICAL MEDICINE & REHABILITATION
Payer: MEDICARE

## 2025-06-27 PROCEDURE — 97110 THERAPEUTIC EXERCISES: CPT

## 2025-06-27 PROCEDURE — 97140 MANUAL THERAPY 1/> REGIONS: CPT

## 2025-06-27 NOTE — PROGRESS NOTES
Progress Summary  Pt has attended 4 visits in Physical Therapy.       Patient: Jeffery Solorzano (60 year old, male) Referring Provider:  Insurance:   Diagnosis: Myalgia (M79.10),  Scapular dyskinesis (G25.89),  Cervical facet syndrome (M47.812),  DDD (degenerative disc disease), cervical (M50.30) Julio Behar  DALE REDDY   Date of Surgery: n/a Next MD visit:  N/A   Precautions:  None (see PMHx) 8-4-25 Referral Information:    Date of Evaluation: Req: 8, Auth: 8, Exp: 8/6/2025 06/06/25 POC Auth Visits:  8       Today's Date   6/27/2025    Subjective  Pt feels the exercises are helping. Mild neck pain today. He has been walking more frequently, every day, which he feels also helps. He feels a little soreness in the morning, not a lot of pain. Max 4-5/10.He still has pain c donning shirts overhead and when laying on R side in bed, or repetitive movements c R arm. He will be going out of town for 3-4 wks and will call when he returns.        Pain: 2/10     Objective       ROM and Strength:  (* denotes performed with pain)                    Cervical/shld ROM  @[facility] 6-6-25 6-27-25 6-27-25   Cerv  Flex 65*neck 45    Cerv  Ext 63 45      R L R L   Cerv  Side bend 38* upper back 45* R shoulder 35 33*R shoulder   Cerv  Rotation 52*neck 50*neck 75 63* R shoulder   Shld flex 110* 143 130* 1/10 pain    Shld abd 105* 125 135* \"a little\"    ,         Shoulder  MMT (-/5) @[facility] 6-6-25 6-27-25    R L R     Flex 4+* 5 4+*     Abd (C5) 4+* 5 5     IR 4* 5 4+     ER 4-* 4 3+*     Low Trap 3* 3+      Mid Trap 3+* 4-                Myotome MMT   MMT (-/5) @[facility] 6-27-25    R L R   Shoulder Abd (C5) 4+* 5 4+*   Elbow Ext (C7) 4* 5 4+*   Elbow Flex (C6) 4+* 5 4+   Wrist Flex (C7) 4+ 5 5   Wrist Ext (C6) 4+* 5 4+* 1/10 pain    strength (lbs)  48  74 51         Assessment   Pt continues to complain of pain at CT junction, but he no longer has pain c all planes of movement, 1o c cervical sidebend and rotation to the L. He also demo  improvement in R shld AROM today vs eval - he reports minimal pain c this, but continues to demonstrate aberrant movement. Overall showing progress toward goals thus far.     Goals (to be met in 8 visits)   Consistently decr pain < or = 3/10 intermittent for incr QOL and activity tolerance--progressing 6-27-25  Overall incr in function as indicated by NDI <20% disability  Incr RUE MMT at least 1/2 grade painfree for incr mm support for ADLs, exercise--progressing 6-27-25  Minimal to no pain c cervical and shoulder AROM to promote painfree ADLs--progressing 6-27-25  Incr R  strength at least 30% toward uninvolved side to progress to PLOF--progressing 6-27-25  Indep HEP to promote cont progress toward functional goals       Plan   Pt is leaving the country for 3-4 wks, and will f/u for reassessment upon his return. He will continue indep HEP during that time.    Treatment Last 4 Visits  Treatment Day: 4       6/6/2025 6/13/2025 6/20/2025 6/27/2025   Spine Treatment   Therapeutic Exercise Ex's performed as detailed in HEP section Retro UBE for scap strengthening and upp thoracic mobility 3min  Seated Scapular Retraction 5\"x20  R scap stab ball on wall up-down/side-side/cw/ccw  B row vs RTB 5\"x2min  Supine R serratus punch 1# 3\"x1min  Supine B shld horiz abd vs YTB x20  Supine B shld diagonal horiz abd vs YTB x20 ea R,L  Supine 'T' pec stretch 2min  Seated Thoracic Rotation with Arms Crossed x20 ea R,L  Scap stabilization slides up wall c pillowcase x20  HEP revise/review Retro UBE for scap strengthening and upp thoracic mobility 3min  Seated Scapular Retraction 5\"x20  Supine R serratus punch 1# 5\"x1min  Supine R shoulder stabilization cw/ccw from 90 deg 1# x20ea  Supine B shld horiz abd vs YTB 3x10  Supine B shld diagonal horiz abd vs YTB 3x10 ea R,L  Prone mid trap 3\"x20  B shld ER vs YTB x20  R scap stab ball on wall up-down/side-side/cw/ccw  B row vs RTB 5\"x2min  Lateral flexion c-spine 5\"x3 gela R,L  Scap  stabilization slides up wall c pillowcase 2x20 Retro UBE for scap strengthening and upp thoracic mobility 4min  Scap stabilization slides up wall c pillowcase 2x20  Reassessment c   HEP review   Neuro Re-Education Pt educated on intramuscular trigger point dry needling via solid filament needles.   Instructions, precautions ( presence of implant in treatment area), risks and benefits thoroughly explained.   Obtained verbal informed consent on 6-6-25 and agreed to treatment starting next session.  Pt denied any of the following relative or absolute contraindications (on initial consent date above): hx of fainting/fear of needles, hx of bleeding disorder c impaired blood clotting, currently taking anticoagulants, pregnancy, currently taking antibiotics for infection, damaged heart valve or other risk of infection, presence of cancer or taking immunosuppressants, diabetic/impaired wound healing, metal allergy.      Manual Therapy  STM R UT/levator STM R upper trap/levator, C7-T2 paraspinals Pt educated on intramuscular trigger point dry needling via solid filament needles.   Instructions, precautions ( no presence of implant in treatment area), risks and benefits thoroughly explained.   Obtained verbal informed consent prior to application of dry needling treatment on 6-27-25 and agreed to treatment today.  Pt denied any of the following relative or absolute contraindications (on initial consent date above): hx of fainting/fear of needles, hx of bleeding disorder c impaired blood clotting, currently taking anticoagulants, pregnancy, currently taking antibiotics for infection, damaged heart valve or other risk of infection, presence of cancer or taking immunosuppressants, diabetic/impaired wound healing, metal allergy.    Technique applied today to the following muscles: R infraspinatus, R latissimus dorsi, R supraspinatus, R upper trap, R subscapularis   Functional assessment sign (pre-test): pain c  elevation RUE, aberrant movement  Adverse effects (Y/N): N  Re-assessment sign (post-test): decr discomfort reported, improved fluidity of movement    Proper hemostasis was applied post-treatment to limit bruising/bleeding.  Pt educated to drink plenty of water, ice/heat the area PRN, and the possibility of post-tx muscle soreness/fatigue. Pt confirmed understanding.   Therapeutic Exercise Minutes 8 35 40 35   Neuro Re-Educ Minutes 10      Manual Therapy Minutes  8 8 10   Total Time Of Timed Procedures 18 43 48 45   Total Time Of Service-Based Procedures 0 0 0 0   Total Treatment Time 18 43 48 45   HEP Access Code: [email or web address]  URL: [email or web address]  Date: 06/06/2025  Prepared by: Sejal Garibay    Exercises  - Seated Scapular Retraction  - 2 x daily - 2 sets - 10 reps  - Seated Thoracic Flexion and Rotation with Arms Crossed  - 2 x daily - 2 sets - 10 reps ADD:   - Scapular Stability on Wall With Pillowcase (Hemiplegia)  - 2 x daily - 20 reps  - Standing Shoulder Diagonal Horizontal Abduction 60/120 Degrees with Resistance  - 2 x daily - 2 sets - 10 reps  - Standing Row with Anchored Resistance  - 2 x daily - 1-2 sets - 20 reps - 3 hold          HEP  ADD:   - Scapular Stability on Wall With Pillowcase (Hemiplegia)  - 2 x daily - 20 reps  - Standing Shoulder Diagonal Horizontal Abduction 60/120 Degrees with Resistance  - 2 x daily - 2 sets - 10 reps  - Standing Row with Anchored Resistance  - 2 x daily - 1-2 sets - 20 reps - 3 hold    Charges  1man,2therex

## 2025-06-27 NOTE — TELEPHONE ENCOUNTER
Called and spoke with patient who states this was a miscommunication.    He is going out of town and needs to dc therapy until he returns from out of town.    He was just needing to inform Dr. Behar as of why he is not going to be doing PT for a while and will need to reschedule his next office visit. Visit rescheduled. Nothing further needed

## 2025-07-03 ENCOUNTER — APPOINTMENT (OUTPATIENT)
Dept: PHYSICAL THERAPY | Age: 60
End: 2025-07-03
Attending: PHYSICAL MEDICINE & REHABILITATION

## 2025-07-11 ENCOUNTER — APPOINTMENT (OUTPATIENT)
Dept: PHYSICAL THERAPY | Age: 60
End: 2025-07-11
Attending: PHYSICAL MEDICINE & REHABILITATION

## 2025-07-18 ENCOUNTER — APPOINTMENT (OUTPATIENT)
Dept: PHYSICAL THERAPY | Age: 60
End: 2025-07-18
Attending: PHYSICAL MEDICINE & REHABILITATION

## 2025-07-25 ENCOUNTER — APPOINTMENT (OUTPATIENT)
Dept: PHYSICAL THERAPY | Age: 60
End: 2025-07-25
Attending: PHYSICAL MEDICINE & REHABILITATION

## 2025-08-27 ENCOUNTER — APPOINTMENT (OUTPATIENT)
Dept: UROLOGY | Age: 60
End: 2025-08-27

## 2025-12-01 ENCOUNTER — APPOINTMENT (OUTPATIENT)
Dept: UROLOGY | Age: 60
End: 2025-12-01

## (undated) DIAGNOSIS — M54.12 CERVICAL RADICULOPATHY: Primary | ICD-10-CM

## (undated) DIAGNOSIS — M75.51 ACUTE BURSITIS OF RIGHT SHOULDER: ICD-10-CM

## (undated) DIAGNOSIS — S43.431A DEGENERATIVE SUPERIOR LABRAL ANTERIOR-TO-POSTERIOR (SLAP) TEAR OF RIGHT SHOULDER: Primary | ICD-10-CM

## (undated) DEVICE — DRAPE,THYROID,SOFT,STERILE: Brand: MEDLINE

## (undated) DEVICE — PREMIUM WET SKIN PREP TRAY: Brand: MEDLINE INDUSTRIES, INC.

## (undated) DEVICE — GAMMEX® PI HYBRID SIZE 7.5, STERILE POWDER-FREE SURGICAL GLOVE, POLYISOPRENE AND NEOPRENE BLEND: Brand: GAMMEX

## (undated) DEVICE — LIGHT HANDLE

## (undated) DEVICE — SPONGE: SPECIALTY PEANUT XR 100/CS: Brand: MEDICAL ACTION INDUSTRIES

## (undated) DEVICE — REMOVER DURAPREP 3M

## (undated) DEVICE — SOLUTION SURG DURA PREP HAZMAT

## (undated) DEVICE — 3.0MM PRECISION NEURO (MATCH HEAD)

## (undated) DEVICE — FLOSEAL HEMOSTATIC MATRIX, 5ML: Brand: FLOSEAL HEMOSTATIC MATRIX

## (undated) DEVICE — TRANSPOSAL ULTRAFLEX DUO/QUAD ULTRA CART MANIFOLD

## (undated) DEVICE — CAP,BOUFFANT,SPUNBOND,BLUE,24": Brand: MEDLINE INDUSTRIES, INC.

## (undated) DEVICE — GOWN,SIRUS,FABRIC-REINFORCED,X-LARGE: Brand: MEDLINE

## (undated) DEVICE — C-ARMOR C-ARM EQUIPMENT COVERS CLEAR STERILE UNIVERSAL FIT 12 PER CASE: Brand: C-ARMOR

## (undated) DEVICE — LAMINECTOMY CDS: Brand: MEDLINE INDUSTRIES, INC.

## (undated) DEVICE — DRAPE C-ARM UNIVERSAL

## (undated) DEVICE — DRILL BIT 6975150 FLUTELESS DRILL BIT

## (undated) DEVICE — 3M(TM) MEDIPORE(TM) +PAD SOFT CLOTH ADHESIVE WOUND DRESSING 3566: Brand: 3M™ MEDIPORE™

## (undated) DEVICE — VIOLET BRAIDED (POLYGLACTIN 910), SYNTHETIC ABSORBABLE SUTURE: Brand: COATED VICRYL

## (undated) DEVICE — GAMMEX® PI HYBRID SIZE 7, STERILE POWDER-FREE SURGICAL GLOVE, POLYISOPRENE AND NEOPRENE BLEND: Brand: GAMMEX

## (undated) DEVICE — GLOVE SURG SENSICARE SZ 7

## (undated) DEVICE — AVANOS* TUOHY EPIDURAL NEEDLE: Brand: AVANOS

## (undated) DEVICE — PAIN TRAY: Brand: MEDLINE INDUSTRIES, INC.

## (undated) DEVICE — PAD SACRAL SPAN AID

## (undated) DEVICE — STERILE POLYISOPRENE POWDER-FREE SURGICAL GLOVES: Brand: PROTEXIS

## (undated) DEVICE — TZ MEDICAL TITANIUM DISTRACTION PINS 14MM: Brand: TZ MEDICAL TITANIUM DISTRACTION PINS

## (undated) DEVICE — GAUZE SPONGES,8 PLY: Brand: CURITY

## (undated) DEVICE — DERMABOND LIQUID ADHESIVE

## (undated) DEVICE — Device

## (undated) DEVICE — BANDAID COVERLET 1X3

## (undated) DEVICE — #15 STERILE STAINLESS BLADE: Brand: STERILE STAINLESS BLADES

## (undated) DEVICE — GLOVE SURG SENSICARE SZ 7-1/2

## (undated) DEVICE — ALCOHOL 70% 4 OZ

## (undated) DEVICE — MARKER SKIN 2 TIP

## (undated) DEVICE — SOL  .9 1000ML BTL

## (undated) DEVICE — DRAPE TABLE COVER 44X90 TC-10

## (undated) DEVICE — KENDALL SCD EXPRESS SLEEVES, THIGH LENGTH, MEDIUM: Brand: KENDALL SCD

## (undated) DEVICE — 3M™ TEGADERM™ TRANSPARENT FILM DRESSING, 1626W, 4 IN X 4-3/4 IN (10 CM X 12 CM), 50 EACH/CARTON, 4 CARTON/CASE: Brand: 3M™ TEGADERM™

## (undated) DEVICE — MARKER PRE-SURGICAL MINI DISP

## (undated) DEVICE — CAUTERY BLADE 2IN INS E1455

## (undated) DEVICE — GLOVE SURG SENSICARE SZ 6-1/2

## (undated) DEVICE — DRILL SRG OIL CRTDG MAESTRO

## (undated) NOTE — LETTER
18          Macey Morrison  :  1965      To Whom It May Concern: This patient was seen in our office on 18 . Work status:  Off work beginning 18 until further notice.     If this office may be of further assistance, please do

## (undated) NOTE — LETTER
Ashlee Dub 39   Date:   1/21/2021     Name:   Macey Morrison    YOB: 1965   MRN:   [id number]       WHERE IS YOUR PAIN NOW? Maurice the areas on your body where you feel the described sensations.   Use the appropriate sy

## (undated) NOTE — LETTER
Patient Name: Flores Pfeiffer        : 1965       Medical Record #: [id number]    CONSENT FOR PROCEDURES/SEDATION    Date: 2021       Time: 8:11 AM        1.  I authorize the performance upon Flores Pfeiffer the following:    ______Right Karma Ho

## (undated) NOTE — LETTER
Date: 9/29/2021    Patient Name: Liz Gilmore      To Whom it may concern: This patient was seen in our office on 09/24/21. He will will stay off work for a little over 2 weeks; may return to previous restrictions on Monday, 10/8/21.  He should avoid

## (undated) NOTE — LETTER
Date: 9/20/2019    Patient Name: Emelia Smoker          To Whom it may concern: This letter has been written at the patient's request. The above patient was seen at the [facility] for treatment of a medical condition.  He should remain of

## (undated) NOTE — LETTER
DECLINATION FORM    [address] [facility] provides interpreters for patients who are deaf, hearing impaired, or have Limited [place] Proficiency in order to ensure thee patients an equal opportunity to benefit from services.  There of 1     Printed: @DATE      Medical Record #: [id number]   Revised (9/4/03)

## (undated) NOTE — LETTER
To Whom It May Concern:  This certifies that Jeffery Solorzano was seen in my office on 5/19/2025. Please excuse him from meetings due to current conditions, these restrictions are valid for 1 year.  Do not hesitate to call with any questions or concerns.        Sincerely,    Alex Behar, MD  [facility]  [address] [address]  [phone]        Document generated by:  Jessenia SCHROEDER MA

## (undated) NOTE — LETTER
Kaycee Reyna [address]  [place], [address] [place] [address]    Consent for Operation  Date: __________________                                Time: _______________    1.  I authorize the performance upon Emelia Smoker the following operation:  Procedure accompanying them. If the procedure has been videotaped, the surgeon will obtain the original videotape. The hospital will not be responsible for storage or maintenance of this tape.   7. For the purpose of advancing medical education, I consent to the admi INSERTION OR COMPLETION WERE FILLED IN.     Signature of Patient:   ___________________________    When the patient is a minor or mentally incompetent to give consent:  Signature of person authorized to consent for patient: ___________________________   Rel medications). Failure to inform my anesthesiologist about these medicines may increase my risk of anesthetic complications. iv. If I am allergic to anything or have had a reaction to anesthesia before.   3. I understand how the anesthesia medicine will hel patient’s representative) and answered their questions. The patient or their representative has agreed to have anesthesia services.     _____________________________________________________________________________  Witness        Date   Time  I have darrick

## (undated) NOTE — MR AVS SNAPSHOT
After Visit Summary   1/15/2020    Arnaud Carson    MRN: [id number]           Visit Information     Date & Time  1/15/2020  8:30 AM Provider  Donaldo Shelby MD Department  [facility] Neurodiagnostics Dept.  Phone  [phone]      Allergies as of request a new code. [employer] Activation Code: [email or web address]  Expires: 3/15/2020  7:59 AM    4. Enter your Zip Code and Date of Birth (mm/dd/yyyy) as indicated and click Next. You will be taken to the next sign-up page. 5. Create a [employer] Username.  This w Communicate with a [facility] Physician or [facility] online. The physician will respond and provide   a treatment plan within a few hours.    ONLINE VISIT  Primary Care Providers  Treatment for mild illness or injury that does not require immediate attention  VIDEO VISI

## (undated) NOTE — LETTER
Date: 4/21/2022    Patient Name: Chase Bradford          To Whom it may concern: This letter has been written at the patient's request. The above patient was seen at the [facility] for treatment of a medical condition. This patient should be excused from attending work/school from 4- through 8-1-2022. Still under treatment for cervical spine and right shoulder conditions.     The patient may return to work/school depending on office evaluations by Neurological Surgery and Orthopedic Surgery in July 2022        Sincerely,          Mikaela Leon MD

## (undated) NOTE — LETTER
[facility]  Phone: [phone]  Right Fax: [phone]  [facility] 20 By:  Ani Simpson RN Date: 19    Patient Name: Erica Mountain  Surgery Date: 3/14/2019    CSN: [id number]  Medical Record: [id number]   :

## (undated) NOTE — LETTER
Date: 4/16/2021    Patient Name: Giovanna Vicentes          To Whom it may concern: This letter has been written at the patient's request. The above patient was seen at the [facility] for treatment of a medical condition.     He may continue

## (undated) NOTE — LETTER
Date: 10/4/2021    Patient Name: Derrek Marcelino      To Whom it may concern: This letter has been written at the patient's request. The above patient was seen at the [facility] for treatment of a medical condition.     This patient was see

## (undated) NOTE — LETTER
[facility]   Date:   8/6/2021     Name:   Rudy Pretty    YOB: 1965   MRN:   [id number]       WHERE IS YOUR PAIN NOW? Maurice the areas on your body where you feel the described sensations.   Use the

## (undated) NOTE — LETTER
19          Emelia Smoker  :  1965      To Whom It May Concern:    I have been following this patient after his cervical surgery.  He has been having increased radicular symptoms in the arm, and I recommend he have an EMG done to determine i

## (undated) NOTE — LETTER
[facility], [address] [place] [address]   Date:   7/30/2021     Name:   Rosa Parmar    YOB: 1965   MRN:   [id number]       WHERE IS YOUR PAIN NOW? Maurice the areas on your body where you feel the described sensations.   Use th

## (undated) NOTE — LETTER
21          [facility]  :  1965      To Whom It May Concern: This patient was seen in our office on 21. 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     If this of

## (undated) NOTE — LETTER
20      Rosy Resendez  :  1965    To Whom It May Concern: This patient was seen in our office on 20. May continue to work full hours, light duty.  No lifting, pushing, pulling more than 10 pounds, limited bending, stooping, no opera

## (undated) NOTE — LETTER
20          LETTER OF MEDICAL NECESSITY    Gwen Juan Jose  :  1965  Reference Code: [id number]      To Whom It May Concern:    I have been seeing this patient starting on 2019 due to neck, right shoulder and arm pain.  He has a history of

## (undated) NOTE — LETTER
21      APPEAL - DENIAL MRI OF THE RIGHT SHOULDER      Adelina Poonam  :  1965      To Whom It May Concern: This is a patient of mine who I have seen for persistent, chronic right shoulder pain.  Symptoms have been ongoing for years after

## (undated) NOTE — LETTER
Date: 10/11/2019    Patient Name: Con Mcgovern      To Whom it may concern:     This letter has been written at the patient's request. The above patient was seen at the [facility] for treatment of a medical condition for a Workers Comp case

## (undated) NOTE — LETTER
Date: 10/25/2019    Patient Name: Amena Lubin          To Whom it may concern: This letter has been written at the patient's request. The above patient was seen at the [facility] for treatment of a medical condition.     May return to w

## (undated) NOTE — LETTER
Date: 11/11/2021    Patient Name: Derrek Marcelino        To Whom it may concern: This letter has been written at the patient's request. The above patient was seen at the [facility] for treatment of a medical condition on 11/11/21.

## (undated) NOTE — LETTER
[address] [facility] [address]   Date:   6/25/2021     Name:   Ha Citizen    YOB: 1965   MRN:   [id number]       WHERE IS YOUR PAIN NOW? Maurice the areas on your body where you feel the described sensations.   Use th

## (undated) NOTE — LETTER
Date: 2018    Patient Name: Monica Garcia  : 1965      To Whom it may concern:    Titus Cortésjadesukhwinder was seen in my office today with a medical condition, he should not go to work today or tomorrow 18 due to this condition, thank you.       [facility]

## (undated) NOTE — LETTER
08/03/20      Monica Garcia  [address] [address]      Dear Monica Garcia. To help us provide the highest quality medical care, [facility] uses a sophisticated computer system to track our patient records.  During a rev

## (undated) NOTE — LETTER
Jeffery Solorzano   [address] [facility] [address]           Dear Jeffery Solorzano     Our records indicate that you have outstanding lab work and or testing that was ordered for you and has not yet been completed:  Lab Frequency Next Occurrence   Hemoglobin A1C [E] Once 02/20/2024   Comp Metabolic Panel (14) [E] monthly       To provide you with the best possible care, please complete these orders at your earliest convenience. If you have recently completed these orders please disregard this letter.     If you have any questions please call the office at [phone].     Thank you,     [facility]

## (undated) NOTE — LETTER
19          Ha Morgan  :  1965      To Whom It May Concern: This patient was seen in our office on 19 . Work status:  Remain off work, temporary total disability.   He will start PT for improved pain control and be re-assessed

## (undated) NOTE — LETTER
Date: 10/18/2021    Patient Name: Arnaud Carson          To Whom it may concern: The above patient was seen at the [facility] for treatment of a medical condition.     This patient should be excused from attending work effective immediate

## (undated) NOTE — LETTER
20          LETTER OF MEDICAL NECESSITY    Belinda Payer  :  1965  Reference Code: [id number]      To Whom It May Concern:    I have been seeing this patient starting on 2019 due to neck, right shoulder and arm pain.  He has a history of

## (undated) NOTE — LETTER
Date: 9/30/2021    Patient Name: Belinda Payer          To Whom it may concern: This patient was seen in our office on 09/24/21. He will will stay off work for a little over 2 weeks; may return to previous restrictions on Monday, 10/8/21.  He should av

## (undated) NOTE — LETTER
[facility] [address]   Date:   9/24/2021     Name:   Ivan Dawkins    YOB: 1965   MRN:   [id number]       WHERE IS YOUR PAIN NOW?   Maurice the areas on your body where you feel the describ

## (undated) NOTE — LETTER
Kaycee Reyna [address]  [place], [address] [place] [address]    Consent for Operation  Date: __________________                                Time: _______________    1.  I authorize the performance upon Rosa Parmar the following operation:  Procedure texts accompanying them. If the procedure has been videotaped, the surgeon will obtain the original videotape. The hospital will not be responsible for storage or maintenance of this tape.     6. For the purpose of advancing medical education, I consent to REQUIRING INSERTION OR COMPLETION WERE FILLED IN.     Signature of Patient:   ___________________________    When the patient is a minor or mentally incompetent to give consent:  Signature of person authorized to consent for patient: _______________________

## (undated) NOTE — LETTER
Date: 4/9/2021    Patient Name: Lory Boo      To Whom it may concern: This letter has been written at the patient's request. The above patient was seen at the [facility] for treatment of a medical condition.     He may continue to wo

## (undated) NOTE — LETTER
21      Amena Amee  :  1965      To Whom It May Concern: This patient was seen in our office on 21. He will will stay off work for a little over 2 weeks; may return to previous restrictions on Monday, 10/8/21.  He should avoid

## (undated) NOTE — LETTER
Patient Name: Benson Crisostomo        : 1965       Medical Record #: [id number]    CONSENT FOR PROCEDURES/SEDATION    Date: 2019       Time: 8:26 AM        1.  I authorize the performance upon Benson Crisostomo the following:    __Bilateral uppe

## (undated) NOTE — LETTER
21          Gwen Ingram  :  1965      To Whom It May Concern: This patient was seen in our office on 21. 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 Restrictions until 10/1/21.     If this

## (undated) NOTE — LETTER
DECLINATION FORM    [address] [facility] provides interpreters for patients who are deaf, hearing impaired, or have Limited [place] Proficiency in order to ensure thee patients an equal opportunity to benefit from services.  There of 1     Printed: @DATE      Medical Record #: [id number]   Revised (9/4/03)